# Patient Record
Sex: MALE | Race: WHITE | URBAN - METROPOLITAN AREA
[De-identification: names, ages, dates, MRNs, and addresses within clinical notes are randomized per-mention and may not be internally consistent; named-entity substitution may affect disease eponyms.]

---

## 2017-06-14 ENCOUNTER — OUTPATIENT (OUTPATIENT)
Dept: OUTPATIENT SERVICES | Facility: HOSPITAL | Age: 62
LOS: 1 days | Discharge: HOME | End: 2017-06-14

## 2017-06-28 DIAGNOSIS — Z95.2 PRESENCE OF PROSTHETIC HEART VALVE: ICD-10-CM

## 2017-06-28 DIAGNOSIS — Z79.01 LONG TERM (CURRENT) USE OF ANTICOAGULANTS: ICD-10-CM

## 2018-04-20 ENCOUNTER — RESULT REVIEW (OUTPATIENT)
Age: 63
End: 2018-04-20

## 2019-09-03 ENCOUNTER — EMERGENCY (EMERGENCY)
Facility: HOSPITAL | Age: 64
LOS: 0 days | Discharge: HOME | End: 2019-09-03
Admitting: EMERGENCY MEDICINE
Payer: COMMERCIAL

## 2019-09-03 VITALS
OXYGEN SATURATION: 100 % | TEMPERATURE: 98 F | DIASTOLIC BLOOD PRESSURE: 85 MMHG | SYSTOLIC BLOOD PRESSURE: 181 MMHG | HEART RATE: 88 BPM | RESPIRATION RATE: 17 BRPM

## 2019-09-03 DIAGNOSIS — Z23 ENCOUNTER FOR IMMUNIZATION: ICD-10-CM

## 2019-09-03 DIAGNOSIS — M25.539 PAIN IN UNSPECIFIED WRIST: ICD-10-CM
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
## 2019-09-03 DIAGNOSIS — L03.114 CELLULITIS OF LEFT UPPER LIMB: ICD-10-CM

## 2019-09-03 DIAGNOSIS — Z79.899 OTHER LONG TERM (CURRENT) DRUG THERAPY: ICD-10-CM

## 2019-09-03 DIAGNOSIS — S50.312A ABRASION OF LEFT ELBOW, INITIAL ENCOUNTER: ICD-10-CM
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
## 2019-09-03 PROCEDURE — 99283 EMERGENCY DEPT VISIT LOW MDM: CPT

## 2019-09-03 RX ORDER — TETANUS TOXOID, REDUCED DIPHTHERIA TOXOID AND ACELLULAR PERTUSSIS VACCINE, ADSORBED 5; 2.5; 8; 8; 2.5 [IU]/.5ML; [IU]/.5ML; UG/.5ML; UG/.5ML; UG/.5ML
0.5 SUSPENSION INTRAMUSCULAR ONCE
Refills: 0 | Status: COMPLETED | OUTPATIENT
Start: 2019-09-03 | End: 2019-09-03

## 2019-09-03 RX ADMIN — TETANUS TOXOID, REDUCED DIPHTHERIA TOXOID AND ACELLULAR PERTUSSIS VACCINE, ADSORBED 0.5 MILLILITER(S): 5; 2.5; 8; 8; 2.5 SUSPENSION INTRAMUSCULAR at 14:09

## 2019-09-03 NOTE — ED ADULT NURSE NOTE - OBJECTIVE STATEMENT
left elbow injury. Pt states he fell off a rolling chair last wed. or thurs. Pt states he picked a scab last night on his elbow. Pt noticed swelling last night into today. Left elbow is swollen and red.

## 2019-09-03 NOTE — ED PROVIDER NOTE - CLINICAL SUMMARY MEDICAL DECISION MAKING FREE TEXT BOX
Pt with cellulitis secondary to wound infx. No joint involvement. Will give PO abx. FU with PMD. I have discussed the discharge plan with the patient. The patient agrees with the plan, as discussed.  The patient understands Emergency Department diagnosis is a preliminary diagnosis often based on limited information and that the patient must adhere to the follow-up plan as discussed.  The patient understands that if the symptoms worsen or if prescribed medications do not have the desired/planned effect that the patient may return to the Emergency Department at any time for further evaluation and treatment.

## 2019-09-03 NOTE — ED PROVIDER NOTE - NS ED MD DISPO DISCHARGE CCDA
Patient/Caregiver provided printed discharge information. - Patient reports subjective fevers at home, afebrile since admission, Tmax 100.2  - Acetaminophen 650 mg PRN q6h for T >100.4

## 2019-09-03 NOTE — ED PROVIDER NOTE - PATIENT PORTAL LINK FT
You can access the FollowMyHealth Patient Portal offered by [facility] by registering at the following website: [email or web address]. By joining [facility]’s FollowMyHealth portal, you will also be able to view your health information using other applications (apps) compatible with our system.

## 2019-09-03 NOTE — ED PROVIDER NOTE - PHYSICAL EXAMINATION
CONST: Well appearing in NAD  MS: Normal ROM in all extremities. No midline spinal tenderness. Left elbow with small abrasion posteriorly with localized redness, swelling and increased heat. No streaking or pus from wound. No pain on ROM of elbow  SKIN: Warm, dry, no acute rashes. Good turgor  NEURO: A&Ox3, No focal deficits. Strength 5/5 with no sensory deficits. Steady gait

## 2019-09-03 NOTE — ED PROVIDER NOTE - OBJECTIVE STATEMENT
Pt fell and injured left elbow 4 days ago. Pt picked a scabbed and recently became red and swollen. No fever. Pain is mild and dull and constant. No pain on ROM

## 2020-09-08 ENCOUNTER — OUTPATIENT (OUTPATIENT)
Dept: OUTPATIENT SERVICES | Facility: HOSPITAL | Age: 65
LOS: 1 days | Discharge: HOME | End: 2020-09-08
Payer: MEDICARE

## 2020-09-08 DIAGNOSIS — M25.571 PAIN IN RIGHT ANKLE AND JOINTS OF RIGHT FOOT: ICD-10-CM

## 2020-09-08 DIAGNOSIS — R52 PAIN, UNSPECIFIED: ICD-10-CM

## 2020-09-08 PROCEDURE — 76882 US LMTD JT/FCL EVL NVASC XTR: CPT | Mod: 26,RT

## 2021-01-26 ENCOUNTER — OUTPATIENT (OUTPATIENT)
Dept: OUTPATIENT SERVICES | Facility: HOSPITAL | Age: 66
LOS: 1 days | Discharge: HOME | End: 2021-01-26

## 2021-01-26 DIAGNOSIS — Z11.59 ENCOUNTER FOR SCREENING FOR OTHER VIRAL DISEASES: ICD-10-CM

## 2022-11-29 PROBLEM — Z00.00 ENCOUNTER FOR PREVENTIVE HEALTH EXAMINATION: Status: ACTIVE | Noted: 2022-11-29

## 2022-12-02 ENCOUNTER — APPOINTMENT (OUTPATIENT)
Dept: CARDIOTHORACIC SURGERY | Facility: CLINIC | Age: 67
End: 2022-12-02

## 2022-12-02 VITALS — HEIGHT: 70 IN | WEIGHT: 210 LBS | BODY MASS INDEX: 30.06 KG/M2

## 2022-12-02 PROCEDURE — G0296 VISIT TO DETERM LDCT ELIG: CPT

## 2022-12-02 NOTE — PLAN
[Smoking Cessation Guidance Provided] : Smoking cessation guidance was provided to patient [Smoking Cessation] : smoking cessation [Regular follow-up with healthcare provider] : regular follow-up with healthcare provider [FreeTextEntry1] : \par Plan:\par -Low Dose CT chest for lung cancer screening\par -Follow up with patient and his referring provider after his LDCT results have been reviewed by the multi-disciplinary clinical team\par -Encouraged smoking cessation\par -[facility] Smokers Quitline literature shared with patient and Staying Smoke Free brochure reviewed\par -Patient declined [facility] Smokers Quitline literature\par -Smoking Cessation was offered, - yes \par Should I Screen? tool utilized. 6 year risk of lung cancer is 3 %. Patient wishes to proceed with screening.\par Engaged in shared decision making with Mr. DAVID PETERSON . Answered all questions. He verbalized understanding and agreement. He knows to call back with any questions or concerns\par \par

## 2022-12-02 NOTE — HISTORY OF PRESENT ILLNESS
[Current] : Current [>=20 Pack-Years] : Twenty pack years or greater smoking history: Yes [TextBox_13] : Mr. DAVID PETERSON is a 67 year old man with a history of AVR 2017, HTN, HLD\par He was seen in the office by Dr. Welch for review of eligibility for, as well as, discussion of Low-Dose CT lung cancer screening program. Over the telephone today we reviewed and confirmed that the patient meets screening eligibility criteria:\par -Age: 67 year \par Smoking status:\par -Current smoker\par -Number of pack(s) per day: 1\par -Number of years smoked: 20\par -Number of pack years smokin\par \par Mr. PETERSON denies any signs or symptoms of lung cancer including new cough, change in cough, hemoptysis and unintentional weight loss. \par Mr. PETERSON denies any personal history of lung cancer. Sister with lung cancer. Denies any history of lung disease. Denies any history of occupational exposures\par \par  [PacksperDay] : 1 [N_Years] : 20 [PacksperYear] : 20

## 2022-12-02 NOTE — REASON FOR VISIT
[Home] : at home, [unfilled] , at the time of the visit. [Medical Office: ([facility])___] : at the medical office located in  [Verbal consent obtained from patient] : the patient, [unfilled] [Initial Evaluation] : an initial evaluation visit [Review of Eligibility] : review of eligibility [Low-Dose CT Screening Discussion] : low-dose CT lung cancer screening discussion [Virtual Visit] : virtual visit

## 2022-12-09 ENCOUNTER — OUTPATIENT (OUTPATIENT)
Dept: OUTPATIENT SERVICES | Facility: HOSPITAL | Age: 67
LOS: 1 days | Discharge: HOME | End: 2022-12-09

## 2022-12-09 DIAGNOSIS — F17.200 NICOTINE DEPENDENCE, UNSPECIFIED, UNCOMPLICATED: ICD-10-CM

## 2022-12-09 DIAGNOSIS — F17.210 NICOTINE DEPENDENCE, CIGARETTES, UNCOMPLICATED: ICD-10-CM

## 2022-12-09 DIAGNOSIS — I10 ESSENTIAL (PRIMARY) HYPERTENSION: ICD-10-CM

## 2022-12-09 PROCEDURE — 71271 CT THORAX LUNG CANCER SCR C-: CPT | Mod: 26

## 2023-08-14 ENCOUNTER — APPOINTMENT (OUTPATIENT)
Dept: NEUROSURGERY | Facility: CLINIC | Age: 68
End: 2023-08-14
Payer: MEDICARE

## 2023-08-14 DIAGNOSIS — M16.11 UNILATERAL PRIMARY OSTEOARTHRITIS, RIGHT HIP: ICD-10-CM

## 2023-08-14 DIAGNOSIS — M48.061 SPINAL STENOSIS, LUMBAR REGION WITHOUT NEUROGENIC CLAUDICATION: ICD-10-CM

## 2023-08-14 DIAGNOSIS — M43.17 SPONDYLOLISTHESIS, LUMBOSACRAL REGION: ICD-10-CM

## 2023-08-14 PROCEDURE — 99204 OFFICE O/P NEW MOD 45 MIN: CPT

## 2023-08-14 NOTE — HISTORY OF PRESENT ILLNESS
[de-identified] : Mr. Townsend presents today as a 68 year old man for initial neurosurgical consult. He has a chief complaint of lower back pain. Patient is a retired  with a 20 year history of lower back pain. As of recent he has begun to notice increased right sided lower back pain. He did undergo right knee replacement with good improvement of his right knee pain. No injury or incident noted. Right sided lower back pain travels into the right buttocks region but not beyond this. He denies left sided symptoms at this time. He does occasionally experience pain into the right groin. His Orthopedic surgeon Dr. Campuzano has been seeing him for his right hip pain.   Patient does advise he was diagnosed with right hip arthritis.   MRI Lumbar 06/08/23: L5-S1 spondylolisthesis. Left posterolateral L3-4 disc protrusion which nearly contacts the left L4 nerve root. Multilevel foraminal disc extension/narrowing greatest at L5-S1.

## 2023-08-14 NOTE — PHYSICAL EXAM
[de-identified] : Mild limp on the right side due to right hip pain  Diminished right knee jerk  No pain on flex/ext lumbar  Lower extremity strength intact bilaterally

## 2023-08-14 NOTE — DISCUSSION/SUMMARY
[de-identified] : No surgical intervention recommended at this time, especially since patient is on a blood thinner. He may benefit from pain management intervention in the future. If any intervention should be carried out surgically, it may be his right hip. I would like for the patient to undergo an X-ray of the lumbar spine to rule out instability. I will see him back once completed.   I, Murali Murdock, attest that this documentation has been prepared under the direction and in the presence of Provider Prabhu Maradiaga MD   Thank you for allowing me to assist in the care of this patient.    Prabhu Maradiaga MD  Board Certified , Neurosurgeon

## 2023-09-15 ENCOUNTER — LABORATORY RESULT (OUTPATIENT)
Age: 68
End: 2023-09-15

## 2023-09-15 ENCOUNTER — OUTPATIENT (OUTPATIENT)
Dept: OUTPATIENT SERVICES | Facility: HOSPITAL | Age: 68
LOS: 1 days | End: 2023-09-15
Payer: MEDICARE

## 2023-09-15 ENCOUNTER — APPOINTMENT (OUTPATIENT)
Dept: HEMATOLOGY ONCOLOGY | Facility: CLINIC | Age: 68
End: 2023-09-15
Payer: MEDICARE

## 2023-09-15 VITALS
HEIGHT: 70 IN | BODY MASS INDEX: 28.63 KG/M2 | RESPIRATION RATE: 14 BRPM | DIASTOLIC BLOOD PRESSURE: 79 MMHG | TEMPERATURE: 98.4 F | SYSTOLIC BLOOD PRESSURE: 167 MMHG | HEART RATE: 85 BPM | WEIGHT: 200 LBS

## 2023-09-15 DIAGNOSIS — I82.409 ACUTE EMBOLISM AND THROMBOSIS OF UNSPECIFIED DEEP VEINS OF UNSPECIFIED LOWER EXTREMITY: ICD-10-CM

## 2023-09-15 PROCEDURE — 83090 ASSAY OF HOMOCYSTEINE: CPT

## 2023-09-15 PROCEDURE — 86146 BETA-2 GLYCOPROTEIN ANTIBODY: CPT

## 2023-09-15 PROCEDURE — 85613 RUSSELL VIPER VENOM DILUTED: CPT

## 2023-09-15 PROCEDURE — 85598 HEXAGNAL PHOSPH PLTLT NEUTRL: CPT

## 2023-09-15 PROCEDURE — 85303 CLOT INHIBIT PROT C ACTIVITY: CPT

## 2023-09-15 PROCEDURE — 81241 F5 GENE: CPT

## 2023-09-15 PROCEDURE — 85300 ANTITHROMBIN III ACTIVITY: CPT

## 2023-09-15 PROCEDURE — 81291 MTHFR GENE: CPT

## 2023-09-15 PROCEDURE — 85730 THROMBOPLASTIN TIME PARTIAL: CPT | Mod: XU

## 2023-09-15 PROCEDURE — G0452: CPT | Mod: 26

## 2023-09-15 PROCEDURE — 85027 COMPLETE CBC AUTOMATED: CPT

## 2023-09-15 PROCEDURE — 99203 OFFICE O/P NEW LOW 30 MIN: CPT

## 2023-09-15 PROCEDURE — 86147 CARDIOLIPIN ANTIBODY EA IG: CPT

## 2023-09-15 PROCEDURE — 85732 THROMBOPLASTIN TIME PARTIAL: CPT | Mod: XU

## 2023-09-15 PROCEDURE — 81240 F2 GENE: CPT

## 2023-09-15 PROCEDURE — 85306 CLOT INHIBIT PROT S FREE: CPT

## 2023-09-15 RX ORDER — ATORVASTATIN CALCIUM 80 MG/1
80 TABLET, FILM COATED ORAL
Refills: 0 | Status: ACTIVE | COMMUNITY

## 2023-09-15 RX ORDER — METOPROLOL SUCCINATE 50 MG/1
50 TABLET, EXTENDED RELEASE ORAL
Refills: 0 | Status: ACTIVE | COMMUNITY

## 2023-09-15 RX ORDER — VALSARTAN AND HYDROCHLOROTHIAZIDE 320; 12.5 MG/1; MG/1
320-12.5 TABLET, FILM COATED ORAL
Refills: 0 | Status: ACTIVE | COMMUNITY

## 2023-09-15 RX ORDER — LATANOPROST/PF 0.005 %
0.01 DROPS OPHTHALMIC (EYE)
Refills: 0 | Status: ACTIVE | COMMUNITY

## 2023-09-15 RX ORDER — OMEPRAZOLE 40 MG/1
40 CAPSULE, DELAYED RELEASE ORAL
Refills: 0 | Status: ACTIVE | COMMUNITY

## 2023-09-15 RX ORDER — ASPIRIN 81 MG/1
81 TABLET, CHEWABLE ORAL
Refills: 0 | Status: ACTIVE | COMMUNITY

## 2023-09-15 RX ORDER — MELOXICAM 15 MG/1
15 TABLET ORAL
Refills: 0 | Status: ACTIVE | COMMUNITY

## 2023-09-15 RX ORDER — NIFEDIPINE 60 MG
60 TABLET, EXTENDED RELEASE ORAL
Refills: 0 | Status: ACTIVE | COMMUNITY

## 2023-09-15 RX ORDER — FUROSEMIDE 40 MG/1
40 TABLET ORAL
Refills: 0 | Status: ACTIVE | COMMUNITY

## 2023-09-15 RX ORDER — TRIAMCINOLONE ACETONIDE 1 MG/G
0.1 CREAM TOPICAL
Refills: 0 | Status: ACTIVE | COMMUNITY

## 2023-09-15 RX ORDER — RIVAROXABAN 20 MG/1
20 TABLET, FILM COATED ORAL
Refills: 0 | Status: ACTIVE | COMMUNITY

## 2023-09-16 DIAGNOSIS — I82.409 ACUTE EMBOLISM AND THROMBOSIS OF UNSPECIFIED DEEP VEINS OF UNSPECIFIED LOWER EXTREMITY: ICD-10-CM

## 2023-10-02 LAB
AT III PPP CHRO-ACNC: 92 %
B2 GLYCOPROT1 IGA SERPL IA-ACNC: 7.3 SAU
B2 GLYCOPROT1 IGG SER-ACNC: 38.6 SGU
B2 GLYCOPROT1 IGM SER-ACNC: <5 SMU
CARDIOLIPIN AB SER IA-ACNC: NEGATIVE
CARDIOLIPIN IGM SER-MCNC: <5 GPL
CARDIOLIPIN IGM SER-MCNC: <5 MPL
DNA PLOIDY SPEC FC-IMP: NORMAL
HCT VFR BLD CALC: 41.9 %
HCYS SERPL-MCNC: 23.1 UMOL/L
HGB BLD-MCNC: 14.5 G/DL
HLX MTHFR FINAL REPORT: NORMAL
MCHC RBC-ENTMCNC: 33.3 PG
MCHC RBC-ENTMCNC: 34.6 G/DL
MCV RBC AUTO: 96.1 FL
PLATELET # BLD AUTO: 155 K/UL
PMV BLD: 9.1 FL
PROT C PPP CHRO-ACNC: 91 %
PROT S FREE PPP-ACNC: 128 %
PTR INTERP: NORMAL
RBC # BLD: 4.36 M/UL
RBC # FLD: 14.5 %
WBC # FLD AUTO: 5.23 K/UL

## 2023-11-14 ENCOUNTER — OUTPATIENT (OUTPATIENT)
Dept: OUTPATIENT SERVICES | Facility: HOSPITAL | Age: 68
LOS: 1 days | End: 2023-11-14
Payer: MEDICARE

## 2023-11-14 ENCOUNTER — APPOINTMENT (OUTPATIENT)
Dept: HEMATOLOGY ONCOLOGY | Facility: CLINIC | Age: 68
End: 2023-11-14
Payer: MEDICARE

## 2023-11-14 VITALS
TEMPERATURE: 98.1 F | WEIGHT: 201 LBS | HEIGHT: 70 IN | DIASTOLIC BLOOD PRESSURE: 75 MMHG | RESPIRATION RATE: 14 BRPM | SYSTOLIC BLOOD PRESSURE: 139 MMHG | BODY MASS INDEX: 28.77 KG/M2 | OXYGEN SATURATION: 98 % | HEART RATE: 84 BPM

## 2023-11-14 DIAGNOSIS — I82.409 ACUTE EMBOLISM AND THROMBOSIS OF UNSPECIFIED DEEP VEINS OF UNSPECIFIED LOWER EXTREMITY: ICD-10-CM

## 2023-11-14 PROCEDURE — 99213 OFFICE O/P EST LOW 20 MIN: CPT

## 2023-12-22 ENCOUNTER — NON-APPOINTMENT (OUTPATIENT)
Age: 68
End: 2023-12-22

## 2023-12-22 DIAGNOSIS — H53.8 OTHER VISUAL DISTURBANCES: ICD-10-CM

## 2023-12-22 DIAGNOSIS — G56.03 CARPAL TUNNEL SYNDROM,BILATERAL UPPER LIMBS: ICD-10-CM

## 2023-12-22 DIAGNOSIS — G54.4 LUMBOSACRAL ROOT DISORDERS, NOT ELSEWHERE CLASSIFIED: ICD-10-CM

## 2023-12-22 DIAGNOSIS — E78.00 PURE HYPERCHOLESTEROLEMIA, UNSPECIFIED: ICD-10-CM

## 2023-12-22 DIAGNOSIS — Z87.39 PERSONAL HISTORY OF OTHER DISEASES OF THE MUSCULOSKELETAL SYSTEM AND CONNECTIVE TISSUE: ICD-10-CM

## 2023-12-22 DIAGNOSIS — G40.109 LOCALIZATION-RELATED (FOCAL) (PARTIAL) SYMPTOMATIC EPILEPSY AND EPILEPTIC SYNDROMES WITH SIMPLE PARTIAL SEIZURES, NOT INTRACTABLE, W/OUT STATUS EPILEPTICUS: ICD-10-CM

## 2023-12-22 DIAGNOSIS — G54.2 CERVICAL ROOT DISORDERS, NOT ELSEWHERE CLASSIFIED: ICD-10-CM

## 2023-12-22 DIAGNOSIS — Z82.49 FAMILY HISTORY OF ISCHEMIC HEART DISEASE AND OTHER DISEASES OF THE CIRCULATORY SYSTEM: ICD-10-CM

## 2023-12-22 DIAGNOSIS — Z84.89 FAMILY HISTORY OF OTHER SPECIFIED CONDITIONS: ICD-10-CM

## 2023-12-27 ENCOUNTER — INPATIENT (INPATIENT)
Facility: HOSPITAL | Age: 68
LOS: 5 days | Discharge: HOME HEALTH | DRG: 291 | End: 2024-01-02
Attending: INTERNAL MEDICINE | Admitting: INTERNAL MEDICINE
Payer: MEDICARE

## 2023-12-27 VITALS
RESPIRATION RATE: 19 BRPM | OXYGEN SATURATION: 85 % | SYSTOLIC BLOOD PRESSURE: 167 MMHG | TEMPERATURE: 99 F | DIASTOLIC BLOOD PRESSURE: 74 MMHG | HEART RATE: 91 BPM

## 2023-12-27 LAB
ALBUMIN SERPL ELPH-MCNC: 3.8 G/DL — SIGNIFICANT CHANGE UP (ref 3.5–5.2)
ALBUMIN SERPL ELPH-MCNC: 3.8 G/DL — SIGNIFICANT CHANGE UP (ref 3.5–5.2)
ALP SERPL-CCNC: 139 U/L — HIGH (ref 30–115)
ALP SERPL-CCNC: 139 U/L — HIGH (ref 30–115)
ALT FLD-CCNC: 35 U/L — SIGNIFICANT CHANGE UP (ref 0–41)
ALT FLD-CCNC: 35 U/L — SIGNIFICANT CHANGE UP (ref 0–41)
ANION GAP SERPL CALC-SCNC: 8 MMOL/L — SIGNIFICANT CHANGE UP (ref 7–14)
ANION GAP SERPL CALC-SCNC: 8 MMOL/L — SIGNIFICANT CHANGE UP (ref 7–14)
ANISOCYTOSIS BLD QL: SLIGHT — SIGNIFICANT CHANGE UP
ANISOCYTOSIS BLD QL: SLIGHT — SIGNIFICANT CHANGE UP
AST SERPL-CCNC: 31 U/L — SIGNIFICANT CHANGE UP (ref 0–41)
AST SERPL-CCNC: 31 U/L — SIGNIFICANT CHANGE UP (ref 0–41)
BASE EXCESS BLDV CALC-SCNC: 8.6 MMOL/L — HIGH (ref -2–3)
BASE EXCESS BLDV CALC-SCNC: 8.6 MMOL/L — HIGH (ref -2–3)
BASOPHILS # BLD AUTO: 0.06 K/UL — SIGNIFICANT CHANGE UP (ref 0–0.2)
BASOPHILS # BLD AUTO: 0.06 K/UL — SIGNIFICANT CHANGE UP (ref 0–0.2)
BASOPHILS NFR BLD AUTO: 0.9 % — SIGNIFICANT CHANGE UP (ref 0–1)
BASOPHILS NFR BLD AUTO: 0.9 % — SIGNIFICANT CHANGE UP (ref 0–1)
BILIRUB SERPL-MCNC: <0.2 MG/DL — SIGNIFICANT CHANGE UP (ref 0.2–1.2)
BILIRUB SERPL-MCNC: <0.2 MG/DL — SIGNIFICANT CHANGE UP (ref 0.2–1.2)
BUN SERPL-MCNC: 19 MG/DL — SIGNIFICANT CHANGE UP (ref 10–20)
BUN SERPL-MCNC: 19 MG/DL — SIGNIFICANT CHANGE UP (ref 10–20)
CA-I SERPL-SCNC: 1.18 MMOL/L — SIGNIFICANT CHANGE UP (ref 1.15–1.33)
CA-I SERPL-SCNC: 1.18 MMOL/L — SIGNIFICANT CHANGE UP (ref 1.15–1.33)
CALCIUM SERPL-MCNC: 9.2 MG/DL — SIGNIFICANT CHANGE UP (ref 8.4–10.4)
CALCIUM SERPL-MCNC: 9.2 MG/DL — SIGNIFICANT CHANGE UP (ref 8.4–10.4)
CHLORIDE SERPL-SCNC: 103 MMOL/L — SIGNIFICANT CHANGE UP (ref 98–110)
CHLORIDE SERPL-SCNC: 103 MMOL/L — SIGNIFICANT CHANGE UP (ref 98–110)
CO2 SERPL-SCNC: 33 MMOL/L — HIGH (ref 17–32)
CO2 SERPL-SCNC: 33 MMOL/L — HIGH (ref 17–32)
CREAT SERPL-MCNC: 1 MG/DL — SIGNIFICANT CHANGE UP (ref 0.7–1.5)
CREAT SERPL-MCNC: 1 MG/DL — SIGNIFICANT CHANGE UP (ref 0.7–1.5)
EGFR: 82 ML/MIN/1.73M2 — SIGNIFICANT CHANGE UP
EGFR: 82 ML/MIN/1.73M2 — SIGNIFICANT CHANGE UP
EOSINOPHIL # BLD AUTO: 0.12 K/UL — SIGNIFICANT CHANGE UP (ref 0–0.7)
EOSINOPHIL # BLD AUTO: 0.12 K/UL — SIGNIFICANT CHANGE UP (ref 0–0.7)
EOSINOPHIL NFR BLD AUTO: 1.7 % — SIGNIFICANT CHANGE UP (ref 0–8)
EOSINOPHIL NFR BLD AUTO: 1.7 % — SIGNIFICANT CHANGE UP (ref 0–8)
GAS PNL BLDV: 139 MMOL/L — SIGNIFICANT CHANGE UP (ref 136–145)
GAS PNL BLDV: 139 MMOL/L — SIGNIFICANT CHANGE UP (ref 136–145)
GAS PNL BLDV: SIGNIFICANT CHANGE UP
GAS PNL BLDV: SIGNIFICANT CHANGE UP
GIANT PLATELETS BLD QL SMEAR: PRESENT — SIGNIFICANT CHANGE UP
GIANT PLATELETS BLD QL SMEAR: PRESENT — SIGNIFICANT CHANGE UP
GLUCOSE SERPL-MCNC: 101 MG/DL — HIGH (ref 70–99)
GLUCOSE SERPL-MCNC: 101 MG/DL — HIGH (ref 70–99)
HCO3 BLDV-SCNC: 37 MMOL/L — HIGH (ref 22–29)
HCO3 BLDV-SCNC: 37 MMOL/L — HIGH (ref 22–29)
HCT VFR BLD CALC: 42.6 % — SIGNIFICANT CHANGE UP (ref 42–52)
HCT VFR BLD CALC: 42.6 % — SIGNIFICANT CHANGE UP (ref 42–52)
HCT VFR BLDA CALC: 42 % — SIGNIFICANT CHANGE UP (ref 39–51)
HCT VFR BLDA CALC: 42 % — SIGNIFICANT CHANGE UP (ref 39–51)
HGB BLD CALC-MCNC: 14 G/DL — SIGNIFICANT CHANGE UP (ref 12.6–17.4)
HGB BLD CALC-MCNC: 14 G/DL — SIGNIFICANT CHANGE UP (ref 12.6–17.4)
HGB BLD-MCNC: 13.4 G/DL — LOW (ref 14–18)
HGB BLD-MCNC: 13.4 G/DL — LOW (ref 14–18)
LACTATE BLDV-MCNC: 1.5 MMOL/L — SIGNIFICANT CHANGE UP (ref 0.5–2)
LACTATE BLDV-MCNC: 1.5 MMOL/L — SIGNIFICANT CHANGE UP (ref 0.5–2)
LYMPHOCYTES # BLD AUTO: 1 K/UL — LOW (ref 1.2–3.4)
LYMPHOCYTES # BLD AUTO: 1 K/UL — LOW (ref 1.2–3.4)
LYMPHOCYTES # BLD AUTO: 13.9 % — LOW (ref 20.5–51.1)
LYMPHOCYTES # BLD AUTO: 13.9 % — LOW (ref 20.5–51.1)
MACROCYTES BLD QL: SLIGHT — SIGNIFICANT CHANGE UP
MACROCYTES BLD QL: SLIGHT — SIGNIFICANT CHANGE UP
MAGNESIUM SERPL-MCNC: 2.5 MG/DL — HIGH (ref 1.8–2.4)
MAGNESIUM SERPL-MCNC: 2.5 MG/DL — HIGH (ref 1.8–2.4)
MANUAL SMEAR VERIFICATION: SIGNIFICANT CHANGE UP
MANUAL SMEAR VERIFICATION: SIGNIFICANT CHANGE UP
MCHC RBC-ENTMCNC: 31.5 G/DL — LOW (ref 32–37)
MCHC RBC-ENTMCNC: 31.5 G/DL — LOW (ref 32–37)
MCHC RBC-ENTMCNC: 33.3 PG — HIGH (ref 27–31)
MCHC RBC-ENTMCNC: 33.3 PG — HIGH (ref 27–31)
MCV RBC AUTO: 105.7 FL — HIGH (ref 80–94)
MCV RBC AUTO: 105.7 FL — HIGH (ref 80–94)
MONOCYTES # BLD AUTO: 0.81 K/UL — HIGH (ref 0.1–0.6)
MONOCYTES # BLD AUTO: 0.81 K/UL — HIGH (ref 0.1–0.6)
MONOCYTES NFR BLD AUTO: 11.3 % — HIGH (ref 1.7–9.3)
MONOCYTES NFR BLD AUTO: 11.3 % — HIGH (ref 1.7–9.3)
NEUTROPHILS # BLD AUTO: 5.19 K/UL — SIGNIFICANT CHANGE UP (ref 1.4–6.5)
NEUTROPHILS # BLD AUTO: 5.19 K/UL — SIGNIFICANT CHANGE UP (ref 1.4–6.5)
NEUTROPHILS NFR BLD AUTO: 72.2 % — SIGNIFICANT CHANGE UP (ref 42.2–75.2)
NEUTROPHILS NFR BLD AUTO: 72.2 % — SIGNIFICANT CHANGE UP (ref 42.2–75.2)
NRBC # BLD: 1 /100 WBCS — HIGH (ref 0–0)
NRBC # BLD: 1 /100 WBCS — HIGH (ref 0–0)
NRBC # BLD: SIGNIFICANT CHANGE UP /100 WBCS (ref 0–0)
NRBC # BLD: SIGNIFICANT CHANGE UP /100 WBCS (ref 0–0)
NT-PROBNP SERPL-SCNC: 4561 PG/ML — HIGH (ref 0–300)
NT-PROBNP SERPL-SCNC: 4561 PG/ML — HIGH (ref 0–300)
PCO2 BLDV: 71 MMHG — HIGH (ref 42–55)
PCO2 BLDV: 71 MMHG — HIGH (ref 42–55)
PH BLDV: 7.33 — SIGNIFICANT CHANGE UP (ref 7.32–7.43)
PH BLDV: 7.33 — SIGNIFICANT CHANGE UP (ref 7.32–7.43)
PLAT MORPH BLD: NORMAL — SIGNIFICANT CHANGE UP
PLAT MORPH BLD: NORMAL — SIGNIFICANT CHANGE UP
PLATELET # BLD AUTO: 262 K/UL — SIGNIFICANT CHANGE UP (ref 130–400)
PLATELET # BLD AUTO: 262 K/UL — SIGNIFICANT CHANGE UP (ref 130–400)
PMV BLD: 9.5 FL — SIGNIFICANT CHANGE UP (ref 7.4–10.4)
PMV BLD: 9.5 FL — SIGNIFICANT CHANGE UP (ref 7.4–10.4)
PO2 BLDV: 20 MMHG — LOW (ref 25–45)
PO2 BLDV: 20 MMHG — LOW (ref 25–45)
POIKILOCYTOSIS BLD QL AUTO: SLIGHT — SIGNIFICANT CHANGE UP
POIKILOCYTOSIS BLD QL AUTO: SLIGHT — SIGNIFICANT CHANGE UP
POLYCHROMASIA BLD QL SMEAR: SLIGHT — SIGNIFICANT CHANGE UP
POLYCHROMASIA BLD QL SMEAR: SLIGHT — SIGNIFICANT CHANGE UP
POTASSIUM BLDV-SCNC: 4.8 MMOL/L — SIGNIFICANT CHANGE UP (ref 3.5–5.1)
POTASSIUM BLDV-SCNC: 4.8 MMOL/L — SIGNIFICANT CHANGE UP (ref 3.5–5.1)
POTASSIUM SERPL-MCNC: 5.5 MMOL/L — HIGH (ref 3.5–5)
POTASSIUM SERPL-MCNC: 5.5 MMOL/L — HIGH (ref 3.5–5)
POTASSIUM SERPL-SCNC: 5.5 MMOL/L — HIGH (ref 3.5–5)
POTASSIUM SERPL-SCNC: 5.5 MMOL/L — HIGH (ref 3.5–5)
PROT SERPL-MCNC: 7.2 G/DL — SIGNIFICANT CHANGE UP (ref 6–8)
PROT SERPL-MCNC: 7.2 G/DL — SIGNIFICANT CHANGE UP (ref 6–8)
RBC # BLD: 4.03 M/UL — LOW (ref 4.7–6.1)
RBC # BLD: 4.03 M/UL — LOW (ref 4.7–6.1)
RBC # FLD: 13.9 % — SIGNIFICANT CHANGE UP (ref 11.5–14.5)
RBC # FLD: 13.9 % — SIGNIFICANT CHANGE UP (ref 11.5–14.5)
RBC BLD AUTO: ABNORMAL
RBC BLD AUTO: ABNORMAL
SAO2 % BLDV: 14.4 % — LOW (ref 67–88)
SAO2 % BLDV: 14.4 % — LOW (ref 67–88)
SCHISTOCYTES BLD QL AUTO: SLIGHT — SIGNIFICANT CHANGE UP
SCHISTOCYTES BLD QL AUTO: SLIGHT — SIGNIFICANT CHANGE UP
SODIUM SERPL-SCNC: 144 MMOL/L — SIGNIFICANT CHANGE UP (ref 135–146)
SODIUM SERPL-SCNC: 144 MMOL/L — SIGNIFICANT CHANGE UP (ref 135–146)
TROPONIN T, HIGH SENSITIVITY RESULT: 51 NG/L — HIGH (ref 6–21)
TROPONIN T, HIGH SENSITIVITY RESULT: 51 NG/L — HIGH (ref 6–21)
WBC # BLD: 7.19 K/UL — SIGNIFICANT CHANGE UP (ref 4.8–10.8)
WBC # BLD: 7.19 K/UL — SIGNIFICANT CHANGE UP (ref 4.8–10.8)
WBC # FLD AUTO: 7.19 K/UL — SIGNIFICANT CHANGE UP (ref 4.8–10.8)
WBC # FLD AUTO: 7.19 K/UL — SIGNIFICANT CHANGE UP (ref 4.8–10.8)

## 2023-12-27 PROCEDURE — 99291 CRITICAL CARE FIRST HOUR: CPT

## 2023-12-27 PROCEDURE — 71046 X-RAY EXAM CHEST 2 VIEWS: CPT | Mod: 26

## 2023-12-27 PROCEDURE — 71275 CT ANGIOGRAPHY CHEST: CPT | Mod: 26,MA

## 2023-12-27 RX ORDER — IPRATROPIUM/ALBUTEROL SULFATE 18-103MCG
3 AEROSOL WITH ADAPTER (GRAM) INHALATION ONCE
Refills: 0 | Status: COMPLETED | OUTPATIENT
Start: 2023-12-27 | End: 2023-12-27

## 2023-12-27 RX ORDER — FUROSEMIDE 40 MG
40 TABLET ORAL ONCE
Refills: 0 | Status: COMPLETED | OUTPATIENT
Start: 2023-12-27 | End: 2023-12-27

## 2023-12-27 RX ADMIN — Medication 40 MILLIGRAM(S): at 21:53

## 2023-12-27 RX ADMIN — Medication 3 MILLILITER(S): at 21:53

## 2023-12-27 RX ADMIN — Medication 3 MILLILITER(S): at 21:54

## 2023-12-27 RX ADMIN — Medication 3 MILLILITER(S): at 21:55

## 2023-12-27 NOTE — ED ADULT NURSE REASSESSMENT NOTE - NS ED NURSE REASSESS COMMENT FT1
Received report from prior RN ,pt is alert and awake, on BIPAP, iv intact vital stable no s/s of distress.

## 2023-12-27 NOTE — ED PROVIDER NOTE - OBJECTIVE STATEMENT
68 year old male, past medical history +smoker, avr, dvt/pe on ac, htn, hdl, chf, who presents with cough. patient with wet cough that began x5 days ago with worsening shortness of breath and increased weakness. as per daughter, patient with increased "lethargy" x3 days prompting ed eval. denies chest pain, hemoptysis, back pain, abd pain, nausea/vomiting, syncope.

## 2023-12-27 NOTE — ED PROVIDER NOTE - CARE PLAN
Principal Discharge DX:	COPD with respiratory distress, acute  Secondary Diagnosis:	CHF exacerbation   1

## 2023-12-27 NOTE — ED ADULT TRIAGE NOTE - CHIEF COMPLAINT QUOTE
slurred speech and confusion for a few days according to wife. pt has history of pe and is on xaralto slurred speech and confusion for a few days according to wife. pt has history of pe and is on Xarelto. pt states he has been coughing for a few days

## 2023-12-27 NOTE — ED PROVIDER NOTE - CLINICAL SUMMARY MEDICAL DECISION MAKING FREE TEXT BOX
69 yo M presented to the ED for SOB. Pt was found to be retaining in the ED and required 6L NC and was subsequently placed on bipap. Pt admitted for further evaluation management. 67 yo M presented to the ED for SOB. Pt was found to be retaining in the ED and required 6L NC and was subsequently placed on bipap. Pt admitted for further evaluation management.

## 2023-12-27 NOTE — ED PROVIDER NOTE - PHYSICAL EXAMINATION
CONSTITUTIONAL: non-toxic appearing male  SKIN: skin exam is warm and dry  HEAD: Normocephalic; atraumatic  EYES: PERRL, conjunctiva and sclera clear.  ENT: MMM   NECK: Supple; non tender.  ROM intact.  CARD: S1, S2 normal, no murmur  RESP: decreased breath sounds bilaterally, +increased wob, speaking full sentences   ABD: soft; non-distended; non-tender.   EXT: 2+ pitting edema bilaterally, no skin changes  NEURO: awake, alert, following commands, oriented, grossly unremarkable. No Focal deficits. GCS 15.   PSYCH: Cooperative, appropriate.

## 2023-12-27 NOTE — ED PROVIDER NOTE - PROGRESS NOTE DETAILS
[facility]

## 2023-12-27 NOTE — ED PROVIDER NOTE - ATTENDING CONTRIBUTION TO CARE
67 yo M with pMH of DVT/PE, CHF, HLD, + smoker presents to the ED for SOB x5 days. No CP, nausea, vomiting, diarrhea, constipation.     Const: No apparent distress  Eyes: PERRL, no conjunctival injection  HENT:  Neck supple without meningismus   CV: RRR, Warm, well-perfused extremities  RESP: CTA B/L, no tachypnea   GI: soft, non-tender, non-distended  MSK: No gross deformities appreciated  Skin: Warm, dry. No rashes  Neuro: Alert, CNs II-XII grossly intact. Sensation and motor function of extremities grossly intact.  Psych: Appropriate mood and affect.    will do labs, cxr,

## 2023-12-28 DIAGNOSIS — Z95.2 PRESENCE OF PROSTHETIC HEART VALVE: Chronic | ICD-10-CM

## 2023-12-28 DIAGNOSIS — J44.1 CHRONIC OBSTRUCTIVE PULMONARY DISEASE WITH (ACUTE) EXACERBATION: ICD-10-CM

## 2023-12-28 LAB
ALBUMIN SERPL ELPH-MCNC: 3.5 G/DL — SIGNIFICANT CHANGE UP (ref 3.5–5.2)
ALBUMIN SERPL ELPH-MCNC: 3.5 G/DL — SIGNIFICANT CHANGE UP (ref 3.5–5.2)
ALP SERPL-CCNC: 139 U/L — HIGH (ref 30–115)
ALP SERPL-CCNC: 139 U/L — HIGH (ref 30–115)
ALT FLD-CCNC: 29 U/L — SIGNIFICANT CHANGE UP (ref 0–41)
ALT FLD-CCNC: 29 U/L — SIGNIFICANT CHANGE UP (ref 0–41)
ANION GAP SERPL CALC-SCNC: 10 MMOL/L — SIGNIFICANT CHANGE UP (ref 7–14)
ANION GAP SERPL CALC-SCNC: 10 MMOL/L — SIGNIFICANT CHANGE UP (ref 7–14)
AST SERPL-CCNC: 23 U/L — SIGNIFICANT CHANGE UP (ref 0–41)
AST SERPL-CCNC: 23 U/L — SIGNIFICANT CHANGE UP (ref 0–41)
BASE EXCESS BLDV CALC-SCNC: 5.7 MMOL/L — HIGH (ref -2–3)
BASE EXCESS BLDV CALC-SCNC: 5.7 MMOL/L — HIGH (ref -2–3)
BASOPHILS # BLD AUTO: 0.05 K/UL — SIGNIFICANT CHANGE UP (ref 0–0.2)
BASOPHILS # BLD AUTO: 0.05 K/UL — SIGNIFICANT CHANGE UP (ref 0–0.2)
BASOPHILS NFR BLD AUTO: 0.7 % — SIGNIFICANT CHANGE UP (ref 0–1)
BASOPHILS NFR BLD AUTO: 0.7 % — SIGNIFICANT CHANGE UP (ref 0–1)
BILIRUB SERPL-MCNC: 0.3 MG/DL — SIGNIFICANT CHANGE UP (ref 0.2–1.2)
BILIRUB SERPL-MCNC: 0.3 MG/DL — SIGNIFICANT CHANGE UP (ref 0.2–1.2)
BUN SERPL-MCNC: 17 MG/DL — SIGNIFICANT CHANGE UP (ref 10–20)
BUN SERPL-MCNC: 17 MG/DL — SIGNIFICANT CHANGE UP (ref 10–20)
CA-I SERPL-SCNC: 1.14 MMOL/L — LOW (ref 1.15–1.33)
CA-I SERPL-SCNC: 1.14 MMOL/L — LOW (ref 1.15–1.33)
CALCIUM SERPL-MCNC: 8.8 MG/DL — SIGNIFICANT CHANGE UP (ref 8.4–10.5)
CALCIUM SERPL-MCNC: 8.8 MG/DL — SIGNIFICANT CHANGE UP (ref 8.4–10.5)
CHLORIDE SERPL-SCNC: 103 MMOL/L — SIGNIFICANT CHANGE UP (ref 98–110)
CHLORIDE SERPL-SCNC: 103 MMOL/L — SIGNIFICANT CHANGE UP (ref 98–110)
CO2 SERPL-SCNC: 32 MMOL/L — SIGNIFICANT CHANGE UP (ref 17–32)
CO2 SERPL-SCNC: 32 MMOL/L — SIGNIFICANT CHANGE UP (ref 17–32)
CREAT SERPL-MCNC: 0.9 MG/DL — SIGNIFICANT CHANGE UP (ref 0.7–1.5)
CREAT SERPL-MCNC: 0.9 MG/DL — SIGNIFICANT CHANGE UP (ref 0.7–1.5)
EGFR: 93 ML/MIN/1.73M2 — SIGNIFICANT CHANGE UP
EGFR: 93 ML/MIN/1.73M2 — SIGNIFICANT CHANGE UP
EOSINOPHIL # BLD AUTO: 0.01 K/UL — SIGNIFICANT CHANGE UP (ref 0–0.7)
EOSINOPHIL # BLD AUTO: 0.01 K/UL — SIGNIFICANT CHANGE UP (ref 0–0.7)
EOSINOPHIL NFR BLD AUTO: 0.1 % — SIGNIFICANT CHANGE UP (ref 0–8)
EOSINOPHIL NFR BLD AUTO: 0.1 % — SIGNIFICANT CHANGE UP (ref 0–8)
FLUAV AG NPH QL: SIGNIFICANT CHANGE UP
FLUAV AG NPH QL: SIGNIFICANT CHANGE UP
FLUBV AG NPH QL: SIGNIFICANT CHANGE UP
FLUBV AG NPH QL: SIGNIFICANT CHANGE UP
GAS PNL BLDA: SIGNIFICANT CHANGE UP
GAS PNL BLDA: SIGNIFICANT CHANGE UP
GAS PNL BLDV: 138 MMOL/L — SIGNIFICANT CHANGE UP (ref 136–145)
GAS PNL BLDV: 138 MMOL/L — SIGNIFICANT CHANGE UP (ref 136–145)
GAS PNL BLDV: SIGNIFICANT CHANGE UP
GAS PNL BLDV: SIGNIFICANT CHANGE UP
GLUCOSE SERPL-MCNC: 143 MG/DL — HIGH (ref 70–99)
GLUCOSE SERPL-MCNC: 143 MG/DL — HIGH (ref 70–99)
HCO3 BLDV-SCNC: 35 MMOL/L — HIGH (ref 22–29)
HCO3 BLDV-SCNC: 35 MMOL/L — HIGH (ref 22–29)
HCT VFR BLD CALC: 41.6 % — LOW (ref 42–52)
HCT VFR BLD CALC: 41.6 % — LOW (ref 42–52)
HCT VFR BLDA CALC: 37 % — LOW (ref 39–51)
HCT VFR BLDA CALC: 37 % — LOW (ref 39–51)
HGB BLD CALC-MCNC: 12.4 G/DL — LOW (ref 12.6–17.4)
HGB BLD CALC-MCNC: 12.4 G/DL — LOW (ref 12.6–17.4)
HGB BLD-MCNC: 13.2 G/DL — LOW (ref 14–18)
HGB BLD-MCNC: 13.2 G/DL — LOW (ref 14–18)
IMM GRANULOCYTES NFR BLD AUTO: 0.8 % — HIGH (ref 0.1–0.3)
IMM GRANULOCYTES NFR BLD AUTO: 0.8 % — HIGH (ref 0.1–0.3)
LACTATE BLDV-MCNC: 1 MMOL/L — SIGNIFICANT CHANGE UP (ref 0.5–2)
LACTATE BLDV-MCNC: 1 MMOL/L — SIGNIFICANT CHANGE UP (ref 0.5–2)
LYMPHOCYTES # BLD AUTO: 0.29 K/UL — LOW (ref 1.2–3.4)
LYMPHOCYTES # BLD AUTO: 0.29 K/UL — LOW (ref 1.2–3.4)
LYMPHOCYTES # BLD AUTO: 3.8 % — LOW (ref 20.5–51.1)
LYMPHOCYTES # BLD AUTO: 3.8 % — LOW (ref 20.5–51.1)
MAGNESIUM SERPL-MCNC: 2.3 MG/DL — SIGNIFICANT CHANGE UP (ref 1.8–2.4)
MAGNESIUM SERPL-MCNC: 2.3 MG/DL — SIGNIFICANT CHANGE UP (ref 1.8–2.4)
MCHC RBC-ENTMCNC: 31.7 G/DL — LOW (ref 32–37)
MCHC RBC-ENTMCNC: 31.7 G/DL — LOW (ref 32–37)
MCHC RBC-ENTMCNC: 33.2 PG — HIGH (ref 27–31)
MCHC RBC-ENTMCNC: 33.2 PG — HIGH (ref 27–31)
MCV RBC AUTO: 104.5 FL — HIGH (ref 80–94)
MCV RBC AUTO: 104.5 FL — HIGH (ref 80–94)
MONOCYTES # BLD AUTO: 0.15 K/UL — SIGNIFICANT CHANGE UP (ref 0.1–0.6)
MONOCYTES # BLD AUTO: 0.15 K/UL — SIGNIFICANT CHANGE UP (ref 0.1–0.6)
MONOCYTES NFR BLD AUTO: 2 % — SIGNIFICANT CHANGE UP (ref 1.7–9.3)
MONOCYTES NFR BLD AUTO: 2 % — SIGNIFICANT CHANGE UP (ref 1.7–9.3)
NEUTROPHILS # BLD AUTO: 7 K/UL — HIGH (ref 1.4–6.5)
NEUTROPHILS # BLD AUTO: 7 K/UL — HIGH (ref 1.4–6.5)
NEUTROPHILS NFR BLD AUTO: 92.6 % — HIGH (ref 42.2–75.2)
NEUTROPHILS NFR BLD AUTO: 92.6 % — HIGH (ref 42.2–75.2)
NRBC # BLD: 0 /100 WBCS — SIGNIFICANT CHANGE UP (ref 0–0)
NRBC # BLD: 0 /100 WBCS — SIGNIFICANT CHANGE UP (ref 0–0)
PCO2 BLDV: 74 MMHG — HIGH (ref 42–55)
PCO2 BLDV: 74 MMHG — HIGH (ref 42–55)
PH BLDV: 7.28 — LOW (ref 7.32–7.43)
PH BLDV: 7.28 — LOW (ref 7.32–7.43)
PLATELET # BLD AUTO: 242 K/UL — SIGNIFICANT CHANGE UP (ref 130–400)
PLATELET # BLD AUTO: 242 K/UL — SIGNIFICANT CHANGE UP (ref 130–400)
PMV BLD: 9.5 FL — SIGNIFICANT CHANGE UP (ref 7.4–10.4)
PMV BLD: 9.5 FL — SIGNIFICANT CHANGE UP (ref 7.4–10.4)
PO2 BLDV: 52 MMHG — HIGH (ref 25–45)
PO2 BLDV: 52 MMHG — HIGH (ref 25–45)
POTASSIUM BLDV-SCNC: 4.4 MMOL/L — SIGNIFICANT CHANGE UP (ref 3.5–5.1)
POTASSIUM BLDV-SCNC: 4.4 MMOL/L — SIGNIFICANT CHANGE UP (ref 3.5–5.1)
POTASSIUM SERPL-MCNC: 4.9 MMOL/L — SIGNIFICANT CHANGE UP (ref 3.5–5)
POTASSIUM SERPL-MCNC: 4.9 MMOL/L — SIGNIFICANT CHANGE UP (ref 3.5–5)
POTASSIUM SERPL-SCNC: 4.9 MMOL/L — SIGNIFICANT CHANGE UP (ref 3.5–5)
POTASSIUM SERPL-SCNC: 4.9 MMOL/L — SIGNIFICANT CHANGE UP (ref 3.5–5)
PROT SERPL-MCNC: 6.8 G/DL — SIGNIFICANT CHANGE UP (ref 6–8)
PROT SERPL-MCNC: 6.8 G/DL — SIGNIFICANT CHANGE UP (ref 6–8)
RBC # BLD: 3.98 M/UL — LOW (ref 4.7–6.1)
RBC # BLD: 3.98 M/UL — LOW (ref 4.7–6.1)
RBC # FLD: 14 % — SIGNIFICANT CHANGE UP (ref 11.5–14.5)
RBC # FLD: 14 % — SIGNIFICANT CHANGE UP (ref 11.5–14.5)
RSV RNA NPH QL NAA+NON-PROBE: SIGNIFICANT CHANGE UP
RSV RNA NPH QL NAA+NON-PROBE: SIGNIFICANT CHANGE UP
SAO2 % BLDV: 75.1 % — SIGNIFICANT CHANGE UP (ref 67–88)
SAO2 % BLDV: 75.1 % — SIGNIFICANT CHANGE UP (ref 67–88)
SARS-COV-2 RNA SPEC QL NAA+PROBE: SIGNIFICANT CHANGE UP
SARS-COV-2 RNA SPEC QL NAA+PROBE: SIGNIFICANT CHANGE UP
SODIUM SERPL-SCNC: 145 MMOL/L — SIGNIFICANT CHANGE UP (ref 135–146)
SODIUM SERPL-SCNC: 145 MMOL/L — SIGNIFICANT CHANGE UP (ref 135–146)
TROPONIN T, HIGH SENSITIVITY RESULT: 47 NG/L — HIGH (ref 6–21)
TROPONIN T, HIGH SENSITIVITY RESULT: 47 NG/L — HIGH (ref 6–21)
WBC # BLD: 7.56 K/UL — SIGNIFICANT CHANGE UP (ref 4.8–10.8)
WBC # BLD: 7.56 K/UL — SIGNIFICANT CHANGE UP (ref 4.8–10.8)
WBC # FLD AUTO: 7.56 K/UL — SIGNIFICANT CHANGE UP (ref 4.8–10.8)
WBC # FLD AUTO: 7.56 K/UL — SIGNIFICANT CHANGE UP (ref 4.8–10.8)

## 2023-12-28 PROCEDURE — 80053 COMPREHEN METABOLIC PANEL: CPT

## 2023-12-28 PROCEDURE — 0241U: CPT

## 2023-12-28 PROCEDURE — 86803 HEPATITIS C AB TEST: CPT

## 2023-12-28 PROCEDURE — 84484 ASSAY OF TROPONIN QUANT: CPT

## 2023-12-28 PROCEDURE — 85025 COMPLETE CBC W/AUTO DIFF WBC: CPT

## 2023-12-28 PROCEDURE — 70551 MRI BRAIN STEM W/O DYE: CPT

## 2023-12-28 PROCEDURE — 85018 HEMOGLOBIN: CPT

## 2023-12-28 PROCEDURE — 84132 ASSAY OF SERUM POTASSIUM: CPT

## 2023-12-28 PROCEDURE — 83880 ASSAY OF NATRIURETIC PEPTIDE: CPT

## 2023-12-28 PROCEDURE — 82803 BLOOD GASES ANY COMBINATION: CPT

## 2023-12-28 PROCEDURE — 93880 EXTRACRANIAL BILAT STUDY: CPT

## 2023-12-28 PROCEDURE — 84295 ASSAY OF SERUM SODIUM: CPT

## 2023-12-28 PROCEDURE — 71045 X-RAY EXAM CHEST 1 VIEW: CPT

## 2023-12-28 PROCEDURE — 94660 CPAP INITIATION&MGMT: CPT

## 2023-12-28 PROCEDURE — 82330 ASSAY OF CALCIUM: CPT

## 2023-12-28 PROCEDURE — 70450 CT HEAD/BRAIN W/O DYE: CPT

## 2023-12-28 PROCEDURE — 85014 HEMATOCRIT: CPT

## 2023-12-28 PROCEDURE — 83735 ASSAY OF MAGNESIUM: CPT

## 2023-12-28 PROCEDURE — 94640 AIRWAY INHALATION TREATMENT: CPT

## 2023-12-28 PROCEDURE — 83605 ASSAY OF LACTIC ACID: CPT

## 2023-12-28 PROCEDURE — 93306 TTE W/DOPPLER COMPLETE: CPT

## 2023-12-28 PROCEDURE — 36415 COLL VENOUS BLD VENIPUNCTURE: CPT

## 2023-12-28 PROCEDURE — 84443 ASSAY THYROID STIM HORMONE: CPT

## 2023-12-28 PROCEDURE — 82607 VITAMIN B-12: CPT

## 2023-12-28 PROCEDURE — 82746 ASSAY OF FOLIC ACID SERUM: CPT

## 2023-12-28 RX ORDER — AZITHROMYCIN 500 MG/1
500 TABLET, FILM COATED ORAL EVERY 24 HOURS
Refills: 0 | Status: DISCONTINUED | OUTPATIENT
Start: 2023-12-28 | End: 2024-01-02

## 2023-12-28 RX ORDER — FUROSEMIDE 40 MG
40 TABLET ORAL DAILY
Refills: 0 | Status: DISCONTINUED | OUTPATIENT
Start: 2023-12-28 | End: 2023-12-29

## 2023-12-28 RX ORDER — RIVAROXABAN 15 MG-20MG
20 KIT ORAL
Refills: 0 | Status: DISCONTINUED | OUTPATIENT
Start: 2023-12-28 | End: 2024-01-02

## 2023-12-28 RX ORDER — VALSARTAN 80 MG/1
320 TABLET ORAL DAILY
Refills: 0 | Status: DISCONTINUED | OUTPATIENT
Start: 2023-12-28 | End: 2024-01-02

## 2023-12-28 RX ORDER — IPRATROPIUM/ALBUTEROL SULFATE 18-103MCG
3 AEROSOL WITH ADAPTER (GRAM) INHALATION EVERY 6 HOURS
Refills: 0 | Status: DISCONTINUED | OUTPATIENT
Start: 2023-12-28 | End: 2023-12-29

## 2023-12-28 RX ORDER — NIFEDIPINE 30 MG
60 TABLET, EXTENDED RELEASE 24 HR ORAL DAILY
Refills: 0 | Status: DISCONTINUED | OUTPATIENT
Start: 2023-12-28 | End: 2024-01-02

## 2023-12-28 RX ORDER — FUROSEMIDE 40 MG
40 TABLET ORAL
Refills: 0 | Status: DISCONTINUED | OUTPATIENT
Start: 2023-12-28 | End: 2023-12-28

## 2023-12-28 RX ORDER — ATORVASTATIN CALCIUM 80 MG/1
80 TABLET, FILM COATED ORAL AT BEDTIME
Refills: 0 | Status: DISCONTINUED | OUTPATIENT
Start: 2023-12-28 | End: 2024-01-02

## 2023-12-28 RX ORDER — METOPROLOL TARTRATE 50 MG
100 TABLET ORAL DAILY
Refills: 0 | Status: DISCONTINUED | OUTPATIENT
Start: 2023-12-28 | End: 2024-01-02

## 2023-12-28 RX ORDER — TIOTROPIUM BROMIDE 18 UG/1
2 CAPSULE ORAL; RESPIRATORY (INHALATION) DAILY
Refills: 0 | Status: DISCONTINUED | OUTPATIENT
Start: 2023-12-28 | End: 2024-01-02

## 2023-12-28 RX ORDER — PANTOPRAZOLE SODIUM 20 MG/1
40 TABLET, DELAYED RELEASE ORAL
Refills: 0 | Status: DISCONTINUED | OUTPATIENT
Start: 2023-12-28 | End: 2024-01-02

## 2023-12-28 RX ORDER — BUDESONIDE AND FORMOTEROL FUMARATE DIHYDRATE 160; 4.5 UG/1; UG/1
2 AEROSOL RESPIRATORY (INHALATION)
Refills: 0 | Status: DISCONTINUED | OUTPATIENT
Start: 2023-12-28 | End: 2024-01-02

## 2023-12-28 RX ADMIN — AZITHROMYCIN 255 MILLIGRAM(S): 500 TABLET, FILM COATED ORAL at 22:22

## 2023-12-28 RX ADMIN — Medication 60 MILLIGRAM(S): at 06:52

## 2023-12-28 RX ADMIN — Medication 60 MILLIGRAM(S): at 18:20

## 2023-12-28 RX ADMIN — Medication 40 MILLIGRAM(S): at 06:53

## 2023-12-28 RX ADMIN — Medication 125 MILLIGRAM(S): at 03:58

## 2023-12-28 RX ADMIN — RIVAROXABAN 20 MILLIGRAM(S): KIT at 18:19

## 2023-12-28 NOTE — H&P ADULT - NSHPLABSRESULTS_GEN_ALL_CORE
LABS:               13.4   7.19  )-----------( 262      ( 27 Dec 2023 20:27 )             42.6     12-27    144  |  103  |  19  ----------------------------<  101<H>  5.5<H>   |  33<H>  |  1.0    Ca    9.2      27 Dec 2023 20:27  Mg     2.5     12-27    TPro  7.2  /  Alb  3.8  /  TBili  <0.2  /  DBili  x   /  AST  31  /  ALT  35  /  AlkPhos  139<H>  12-27

## 2023-12-28 NOTE — H&P ADULT - NSICDXPASTMEDICALHX_GEN_ALL_CORE_FT
PAST MEDICAL HISTORY:  COPD, moderate     DVT, lower extremity     Dyslipidemia     Factor V Leiden     HTN (hypertension)

## 2023-12-28 NOTE — PATIENT PROFILE ADULT - FALL HARM RISK - HARM RISK INTERVENTIONS
Communicate Risk of Fall with Harm to all staff/Monitor for mental status changes/Reinforce activity limits and safety measures with patient and family/Tailored Fall Risk Interventions/Visual Cue: Yellow wristband and red socks/Bed in lowest position, wheels locked, appropriate side rails in place/Call bell, personal items and telephone in reach/Instruct patient to call for assistance before getting out of bed or chair/Non-slip footwear when patient is out of bed/[place] to call system/Physically safe environment - no spills, clutter or unnecessary equipment/Purposeful Proactive Rounding/Room/bathroom lighting operational, light cord in reach Communicate Risk of Fall with Harm to all staff/Monitor for mental status changes/Reinforce activity limits and safety measures with patient and family/Tailored Fall Risk Interventions/Visual Cue: Yellow wristband and red socks/Bed in lowest position, wheels locked, appropriate side rails in place/Call bell, personal items and telephone in reach/Instruct patient to call for assistance before getting out of bed or chair/Non-slip footwear when patient is out of bed/[place] to call system/Physically safe environment - no spills, clutter or unnecessary equipment/Purposeful Proactive Rounding/Room/bathroom lighting operational, light cord in reach

## 2023-12-28 NOTE — ED ADULT NURSE REASSESSMENT NOTE - NS ED NURSE REASSESS COMMENT FT1
attempted report to [phone] at this time. as per  nurse cannot come to phone at this time. asked to call back in 10 minutes

## 2023-12-28 NOTE — H&P ADULT - NSHPPHYSICALEXAM_GEN_ALL_CORE
T(C): 37.3 (12-27-23 @ 18:12), Max: 37.4 (12-27-23 @ 15:56)  HR: 86 (12-28-23 @ 01:03) (80 - 92)  BP: 156/65 (12-28-23 @ 01:03) (141/65 - 167/85)  RR: 20 (12-28-23 @ 01:03) (19 - 20)  SpO2: 100% (12-28-23 @ 01:03) (85% - 100%)    CONSTITUTIONAL: No apparent distress  EYES: PERRLA and symmetric, EOMI, No conjunctival or scleral injection, non-icteric  ENMT: Oral mucosa with moist membranes. Normal dentition; no pharyngeal injection or exudates  NECK: Supple, symmetric and without tracheal deviation   RESP: No respiratory distress, diffuse bilateral wheezing and crackles  CV: RRR, +S1S2, No murmur heard over wheezing, +2 LLE R>>L upto knees  GI: Soft, NT, ND, no rebound, no guarding;  SKIN: No rashes or ulcers noted  NEURO: AOx3, no focal deficits

## 2023-12-28 NOTE — H&P ADULT - HISTORY OF PRESENT ILLNESS
59 yo man active smoke, known COPD, hx of Rrt DVT, factor V Leiden,  HTN, DL, and hx of AVR (Bioprosthetic , 2017) presenting for confusion and slurred speech which was noticed by his family.  He admits to feeling worsening shortness of breath with increasing cough and sputum associated with worsening lower ext swelling, orthopnea and PND over past 3-4 days. Patient tis not compliant with his lasix  or his inhalers. Denies purulent sputum, sick contacts, fever, chills, chest pain/    In ED he was found to be hypoxemic and started on O2 then switched to BiPAP after he developed Respiratory acidosis.Work up showed pulmonary congestion, elevated Pro-BNP  CTA showed no PE.  He was given lasix and admitted for further management     61 yo man active smoke, known COPD, hx of Rrt DVT, factor V Leiden,  HTN, DL, and hx of AVR (Bioprosthetic , 2017) presenting for confusion and slurred speech which was noticed by his family.  He admits to feeling worsening shortness of breath with increasing cough and sputum associated with worsening lower ext swelling, orthopnea and PND over past 3-4 days. Patient tis not compliant with his lasix  or his inhalers. Denies purulent sputum, sick contacts, fever, chills, chest pain/    In ED he was found to be hypoxemic and started on O2 then switched to BiPAP after he developed Respiratory acidosis.Work up showed pulmonary congestion, elevated Pro-BNP  CTA showed no PE.  He was given lasix and admitted for further management     61 yo man active smoke, known COPD on BiPAP at night, hx of Rrt DVT, factor V Leiden,  HTN, DL, and hx of AVR (Bioprosthetic , 2017) presenting for confusion and slurred speech which was noticed by his family.  He admits to feeling worsening shortness of breath with increasing cough and sputum associated with worsening lower ext swelling, orthopnea and PND over past 3-4 days. Patient tis not compliant with BiPAP, lasix  or inhalers. Denies purulent sputum, sick contacts, fever, chills, chest pain/    In ED he was found to be hypoxemic and started on O2 then switched to BiPAP after he developed Respiratory acidosis.Work up showed pulmonary congestion, elevated Pro-BNP  CTA showed no PE.  He was given lasix and admitted for further management     59 yo man active smoke, known COPD on BiPAP at night, hx of Rrt DVT, factor V Leiden,  HTN, DL, and hx of AVR (Bioprosthetic , 2017) presenting for confusion and slurred speech which was noticed by his family.  He admits to feeling worsening shortness of breath with increasing cough and sputum associated with worsening lower ext swelling, orthopnea and PND over past 3-4 days. Patient tis not compliant with BiPAP, lasix  or inhalers. Denies purulent sputum, sick contacts, fever, chills, chest pain/    In ED he was found to be hypoxemic and started on O2 then switched to BiPAP after he developed Respiratory acidosis.Work up showed pulmonary congestion, elevated Pro-BNP  CTA showed no PE.  He was given lasix and admitted for further management

## 2023-12-28 NOTE — ED ADULT NURSE NOTE - CHIEF COMPLAINT QUOTE
slurred speech and confusion for a few days according to wife. pt has history of pe and is on Xarelto. pt states he has been coughing for a few days

## 2023-12-28 NOTE — H&P ADULT - ASSESSMENT
61 yo man active smoke, known COPD, hx of Rrt DVT, factor V Leiden,  HTN, DL, and hx of AVR (Bioprosthetic , 2017) presenting Hypoxic, hyupercapnic respiratory failure 2/2 AECOPD and Decompensated HFpEF    #AHRF  #Acute respiratory acidosis  #AECOPD  #AMS/CO2 Narcosis - resolved at time of evaluation  - C/w BD  - solumedrol IV 125mg once then 60bid followed by taper  - azithromycin 500mg qd  - O2 to target SpO2 89-92%  - BiPAP as needed    #Decompensated HF  #AVR  #HTN  - Pro-BNP elevated  - ECG and trops unremarkable  - c/w lasix 40mg bid  - c/w metoprolol  - c/w valsartan and nifedipine  - hold hctz  - TTE  - TSH  - strick Is and Os   - O2 and BIPAP as needed    #hyperkalemia on labs  - 2/2 acidosis  - repeat K on ABGs showed K 4.7 with normal pH  - c/w with valsartan    #RT LE DVT  #Factor v leiden  -c/w xarelto    #DL  c/w atorva    stable for floors but a high risk for worsening and upgrade to step down 59 yo man active smoke, known COPD, hx of Rrt DVT, factor V Leiden,  HTN, DL, and hx of AVR (Bioprosthetic , 2017) presenting Hypoxic, hyupercapnic respiratory failure 2/2 AECOPD and Decompensated HFpEF    #AHRF  #Acute respiratory acidosis  #AECOPD  #AMS/CO2 Narcosis - resolved at time of evaluation  - C/w BD  - solumedrol IV 125mg once then 60bid followed by taper  - azithromycin 500mg qd  - O2 to target SpO2 89-92%  - BiPAP as needed    #Decompensated HF  #AVR  #HTN  - Pro-BNP elevated  - ECG and trops unremarkable  - c/w lasix 40mg bid  - c/w metoprolol  - c/w valsartan and nifedipine  - hold hctz  - TTE  - TSH  - strick Is and Os   - O2 and BIPAP as needed    #hyperkalemia on labs  - 2/2 acidosis  - repeat K on ABGs showed K 4.7 with normal pH  - c/w with valsartan    #RT LE DVT  #Factor v leiden  -c/w xarelto    #DL  c/w atorva    stable for floors but a high risk for worsening and upgrade to step down

## 2023-12-28 NOTE — PROGRESS NOTE ADULT - SUBJECTIVE AND OBJECTIVE BOX
DAVID PETERSON  68y  Male      Patient is a 68y old  Male who presents with a chief complaint of SOB (28 Dec 2023 03:48)    61 yo man active smoke, known COPD on BiPAP at night, hx of Rrt DVT, factor V Leiden,  HTN, DL, and hx of AVR (Bioprosthetic , 2017) presenting for confusion and slurred speech which was noticed by his family.  He admits to feeling worsening shortness of breath with increasing cough and sputum associated with worsening lower ext swelling, orthopnea and PND over past 3-4 days. Patient tis not compliant with BiPAP, lasix  or inhalers. Denies purulent sputum, sick contacts, fever, chills, chest pain/    In ED he was found to be hypoxemic and started on O2 then switched to BiPAP after he developed Respiratory acidosis.Work up showed pulmonary congestion, elevated Pro-BNP  CTA showed no PE.  He was given lasix and admitted for further management      REVIEW OF SYSTEMS:  CONSTITUTIONAL: No fever, weight loss, or fatigue  EYES: No eye pain, visual disturbances, or discharge  ENMT:  No difficulty hearing, tinnitus, vertigo; No sinus or throat pain  NECK: No pain or stiffness  BREASTS: No pain, masses, or nipple discharge  RESPIRATORY: No cough, wheezing, chills or hemoptysis;  shortness of breath+  CARDIOVASCULAR: No chest pain, palpitations, dizziness, or leg swelling  GASTROINTESTINAL: No abdominal or epigastric pain. No nausea, vomiting, or hematemesis; No diarrhea or constipation. No melena or hematochezia.  GENITOURINARY: No dysuria, frequency, hematuria, or incontinence  MUSCULOSKELETAL: No joint pain or swelling; No muscle, back, or extremity pain  PSYCHIATRIC: No depression, anxiety, mood swings, or difficulty sleeping  HEME/LYMPH: No easy bruising, or bleeding gums  ALLERY AND IMMUNOLOGIC: No hives or eczema  FAMILY HISTORY:    T(C): 37 (12-28-23 @ 08:02), Max: 37.4 (12-27-23 @ 15:56)  HR: 81 (12-28-23 @ 08:02) (78 - 92)  BP: 134/77 (12-28-23 @ 08:02) (128/58 - 167/85)  RR: 20 (12-28-23 @ 08:02) (19 - 20)  SpO2: 100% (12-28-23 @ 08:02) (85% - 100%)  Wt(kg): --Vital Signs Last 24 Hrs  T(C): 37 (28 Dec 2023 08:02), Max: 37.4 (27 Dec 2023 15:56)  T(F): 98.6 (28 Dec 2023 08:02), Max: 99.4 (27 Dec 2023 15:56)  HR: 81 (28 Dec 2023 08:02) (78 - 92)  BP: 134/77 (28 Dec 2023 08:02) (128/58 - 167/85)  BP(mean): --  RR: 20 (28 Dec 2023 08:02) (19 - 20)  SpO2: 100% (28 Dec 2023 08:02) (85% - 100%)    Parameters below as of 28 Dec 2023 08:02  Patient On (Oxygen Delivery Method): BiPAP/CPAP      No Known Allergies      PHYSICAL EXAM:  GENERAL: NAD,   HEAD:  Atraumatic, Normocephalic  EYES: EOMI, PERRLA, conjunctiva and sclera clear  ENMT: No tonsillar erythema, exudates, or enlargement;   NECK: Supple, No JVD, Normal thyroid  NERVOUS SYSTEM:  Alert & Oriented X3, Good concentration;   CHEST/LUNG: vbs bilateral ronchi+  HEART: Regular rate and rhythm; No murmurs, rubs, or gallops  ABDOMEN: Soft, Nontender, Nondistended; Bowel sounds present  EXTREMITIES:  , No clubbing, cyanosis, or edema  LYMPH: No lymphadenopathy noted  SKIN: No rashes or lesions      LABS:  12-27    144  |  103  |  19  ----------------------------<  101<H>  5.5<H>   |  33<H>  |  1.0    Ca    9.2      27 Dec 2023 20:27  Mg     2.5     12-27    TPro  7.2  /  Alb  3.8  /  TBili  <0.2  /  DBili  x   /  AST  31  /  ALT  35  /  AlkPhos  139<H>  12-27                          13.4   7.19  )-----------( 262      ( 27 Dec 2023 20:27 )             42.6         < from: CT Angio Chest PE Protocol w/ IV Cont (12.27.23 @ 21:07) >  1.  No evidence of acute pulmonary embolus.  2.  No acute lobar consolidation.  3.  Small patchy groundglass density in the right upper lobe adjacent to   a slightly thickened bronchiole,nonspecific though likely related to   small airways inflammation.  4.  Few right perihilar lymph nodes, likely reactive.    --- End of Report ---    < end of copied text >  RADIOLOGY & ADDITIONAL TESTS:    MEDICATION:  albuterol/ipratropium for Nebulization 3 milliLiter(s) Nebulizer every 6 hours  atorvastatin 80 milliGRAM(s) Oral at bedtime  azithromycin  IVPB 500 milliGRAM(s) IV Intermittent every 24 hours  budesonide 160 MICROgram(s)/formoterol 4.5 MICROgram(s) Inhaler 2 Puff(s) Inhalation two times a day  furosemide   Injectable 40 milliGRAM(s) IV Push two times a day  methylPREDNISolone sodium succinate Injectable 60 milliGRAM(s) IV Push every 12 hours  metoprolol succinate  milliGRAM(s) Oral daily  NIFEdipine XL 60 milliGRAM(s) Oral daily  pantoprazole    Tablet 40 milliGRAM(s) Oral before breakfast  rivaroxaban 20 milliGRAM(s) Oral with dinner  tiotropium 2.5 MICROgram(s) Inhaler 2 Puff(s) Inhalation daily  valsartan 320 milliGRAM(s) Oral daily      HEALTH ISSUES - PROBLEM Dx:  61 yo man active smoke, known COPD, hx of Rrt DVT, factor V Leiden,  HTN, DL, and hx of AVR (Bioprosthetic , 2017) presenting Hypoxic, hyupercapnic respiratory failure 2/2 AECOPD and Decompensated HFpEF    #AHRF  #Acute respiratory acidosis  #AECOPD  #AMS/CO2 Narcosis - resolved at time of evaluation  - C/w BD  - solumedrol IV 125mg once then 60bid followed by taper  - azithromycin 500mg qd  - O2 to target SpO2 89-92%  - BiPAP     #Decompensated HF  #AVR  #HTN  - Pro-BNP elevated  - ECG and trops unremarkable  - c/w lasix 40mg daily  - c/w metoprolol  - c/w valsartan and nifedipine  - hold hctz  - TTE  - TSH  - strick Is and Os   - O2 and BIPAP as needed    #hyperkalemia on labs repeat  hx Rt DVT/factor v leiden on rivaroxaban         DAVID PETERSON  68y  Male      Patient is a 68y old  Male who presents with a chief complaint of SOB (28 Dec 2023 03:48)    59 yo man active smoke, known COPD on BiPAP at night, hx of Rrt DVT, factor V Leiden,  HTN, DL, and hx of AVR (Bioprosthetic , 2017) presenting for confusion and slurred speech which was noticed by his family.  He admits to feeling worsening shortness of breath with increasing cough and sputum associated with worsening lower ext swelling, orthopnea and PND over past 3-4 days. Patient tis not compliant with BiPAP, lasix  or inhalers. Denies purulent sputum, sick contacts, fever, chills, chest pain/    In ED he was found to be hypoxemic and started on O2 then switched to BiPAP after he developed Respiratory acidosis.Work up showed pulmonary congestion, elevated Pro-BNP  CTA showed no PE.  He was given lasix and admitted for further management      REVIEW OF SYSTEMS:  CONSTITUTIONAL: No fever, weight loss, or fatigue  EYES: No eye pain, visual disturbances, or discharge  ENMT:  No difficulty hearing, tinnitus, vertigo; No sinus or throat pain  NECK: No pain or stiffness  BREASTS: No pain, masses, or nipple discharge  RESPIRATORY: No cough, wheezing, chills or hemoptysis;  shortness of breath+  CARDIOVASCULAR: No chest pain, palpitations, dizziness, or leg swelling  GASTROINTESTINAL: No abdominal or epigastric pain. No nausea, vomiting, or hematemesis; No diarrhea or constipation. No melena or hematochezia.  GENITOURINARY: No dysuria, frequency, hematuria, or incontinence  MUSCULOSKELETAL: No joint pain or swelling; No muscle, back, or extremity pain  PSYCHIATRIC: No depression, anxiety, mood swings, or difficulty sleeping  HEME/LYMPH: No easy bruising, or bleeding gums  ALLERY AND IMMUNOLOGIC: No hives or eczema  FAMILY HISTORY:    T(C): 37 (12-28-23 @ 08:02), Max: 37.4 (12-27-23 @ 15:56)  HR: 81 (12-28-23 @ 08:02) (78 - 92)  BP: 134/77 (12-28-23 @ 08:02) (128/58 - 167/85)  RR: 20 (12-28-23 @ 08:02) (19 - 20)  SpO2: 100% (12-28-23 @ 08:02) (85% - 100%)  Wt(kg): --Vital Signs Last 24 Hrs  T(C): 37 (28 Dec 2023 08:02), Max: 37.4 (27 Dec 2023 15:56)  T(F): 98.6 (28 Dec 2023 08:02), Max: 99.4 (27 Dec 2023 15:56)  HR: 81 (28 Dec 2023 08:02) (78 - 92)  BP: 134/77 (28 Dec 2023 08:02) (128/58 - 167/85)  BP(mean): --  RR: 20 (28 Dec 2023 08:02) (19 - 20)  SpO2: 100% (28 Dec 2023 08:02) (85% - 100%)    Parameters below as of 28 Dec 2023 08:02  Patient On (Oxygen Delivery Method): BiPAP/CPAP      No Known Allergies      PHYSICAL EXAM:  GENERAL: NAD,   HEAD:  Atraumatic, Normocephalic  EYES: EOMI, PERRLA, conjunctiva and sclera clear  ENMT: No tonsillar erythema, exudates, or enlargement;   NECK: Supple, No JVD, Normal thyroid  NERVOUS SYSTEM:  Alert & Oriented X3, Good concentration;   CHEST/LUNG: vbs bilateral ronchi+  HEART: Regular rate and rhythm; No murmurs, rubs, or gallops  ABDOMEN: Soft, Nontender, Nondistended; Bowel sounds present  EXTREMITIES:  , No clubbing, cyanosis, or edema  LYMPH: No lymphadenopathy noted  SKIN: No rashes or lesions      LABS:  12-27    144  |  103  |  19  ----------------------------<  101<H>  5.5<H>   |  33<H>  |  1.0    Ca    9.2      27 Dec 2023 20:27  Mg     2.5     12-27    TPro  7.2  /  Alb  3.8  /  TBili  <0.2  /  DBili  x   /  AST  31  /  ALT  35  /  AlkPhos  139<H>  12-27                          13.4   7.19  )-----------( 262      ( 27 Dec 2023 20:27 )             42.6         < from: CT Angio Chest PE Protocol w/ IV Cont (12.27.23 @ 21:07) >  1.  No evidence of acute pulmonary embolus.  2.  No acute lobar consolidation.  3.  Small patchy groundglass density in the right upper lobe adjacent to   a slightly thickened bronchiole,nonspecific though likely related to   small airways inflammation.  4.  Few right perihilar lymph nodes, likely reactive.    --- End of Report ---    < end of copied text >  RADIOLOGY & ADDITIONAL TESTS:    MEDICATION:  albuterol/ipratropium for Nebulization 3 milliLiter(s) Nebulizer every 6 hours  atorvastatin 80 milliGRAM(s) Oral at bedtime  azithromycin  IVPB 500 milliGRAM(s) IV Intermittent every 24 hours  budesonide 160 MICROgram(s)/formoterol 4.5 MICROgram(s) Inhaler 2 Puff(s) Inhalation two times a day  furosemide   Injectable 40 milliGRAM(s) IV Push two times a day  methylPREDNISolone sodium succinate Injectable 60 milliGRAM(s) IV Push every 12 hours  metoprolol succinate  milliGRAM(s) Oral daily  NIFEdipine XL 60 milliGRAM(s) Oral daily  pantoprazole    Tablet 40 milliGRAM(s) Oral before breakfast  rivaroxaban 20 milliGRAM(s) Oral with dinner  tiotropium 2.5 MICROgram(s) Inhaler 2 Puff(s) Inhalation daily  valsartan 320 milliGRAM(s) Oral daily      HEALTH ISSUES - PROBLEM Dx:  59 yo man active smoke, known COPD, hx of Rrt DVT, factor V Leiden,  HTN, DL, and hx of AVR (Bioprosthetic , 2017) presenting Hypoxic, hyupercapnic respiratory failure 2/2 AECOPD and Decompensated HFpEF    #AHRF  #Acute respiratory acidosis  #AECOPD  #AMS/CO2 Narcosis - resolved at time of evaluation  - C/w BD  - solumedrol IV 125mg once then 60bid followed by taper  - azithromycin 500mg qd  - O2 to target SpO2 89-92%  - BiPAP     #Decompensated HF  #AVR  #HTN  - Pro-BNP elevated  - ECG and trops unremarkable  - c/w lasix 40mg daily  - c/w metoprolol  - c/w valsartan and nifedipine  - hold hctz  - TTE  - TSH  - strick Is and Os   - O2 and BIPAP as needed    #hyperkalemia on labs repeat  hx Rt DVT/factor v leiden on rivaroxaban

## 2023-12-29 LAB
ALBUMIN SERPL ELPH-MCNC: 3.3 G/DL — LOW (ref 3.5–5.2)
ALBUMIN SERPL ELPH-MCNC: 3.3 G/DL — LOW (ref 3.5–5.2)
ALP SERPL-CCNC: 124 U/L — HIGH (ref 30–115)
ALP SERPL-CCNC: 124 U/L — HIGH (ref 30–115)
ALT FLD-CCNC: 23 U/L — SIGNIFICANT CHANGE UP (ref 0–41)
ALT FLD-CCNC: 23 U/L — SIGNIFICANT CHANGE UP (ref 0–41)
ANION GAP SERPL CALC-SCNC: 10 MMOL/L — SIGNIFICANT CHANGE UP (ref 7–14)
ANION GAP SERPL CALC-SCNC: 10 MMOL/L — SIGNIFICANT CHANGE UP (ref 7–14)
AST SERPL-CCNC: 20 U/L — SIGNIFICANT CHANGE UP (ref 0–41)
AST SERPL-CCNC: 20 U/L — SIGNIFICANT CHANGE UP (ref 0–41)
BASOPHILS # BLD AUTO: 0.02 K/UL — SIGNIFICANT CHANGE UP (ref 0–0.2)
BASOPHILS # BLD AUTO: 0.02 K/UL — SIGNIFICANT CHANGE UP (ref 0–0.2)
BASOPHILS NFR BLD AUTO: 0.2 % — SIGNIFICANT CHANGE UP (ref 0–1)
BASOPHILS NFR BLD AUTO: 0.2 % — SIGNIFICANT CHANGE UP (ref 0–1)
BILIRUB SERPL-MCNC: <0.2 MG/DL — SIGNIFICANT CHANGE UP (ref 0.2–1.2)
BILIRUB SERPL-MCNC: <0.2 MG/DL — SIGNIFICANT CHANGE UP (ref 0.2–1.2)
BUN SERPL-MCNC: 37 MG/DL — HIGH (ref 10–20)
BUN SERPL-MCNC: 37 MG/DL — HIGH (ref 10–20)
CALCIUM SERPL-MCNC: 8.7 MG/DL — SIGNIFICANT CHANGE UP (ref 8.4–10.4)
CALCIUM SERPL-MCNC: 8.7 MG/DL — SIGNIFICANT CHANGE UP (ref 8.4–10.4)
CHLORIDE SERPL-SCNC: 102 MMOL/L — SIGNIFICANT CHANGE UP (ref 98–110)
CHLORIDE SERPL-SCNC: 102 MMOL/L — SIGNIFICANT CHANGE UP (ref 98–110)
CO2 SERPL-SCNC: 31 MMOL/L — SIGNIFICANT CHANGE UP (ref 17–32)
CO2 SERPL-SCNC: 31 MMOL/L — SIGNIFICANT CHANGE UP (ref 17–32)
CREAT SERPL-MCNC: 1.2 MG/DL — SIGNIFICANT CHANGE UP (ref 0.7–1.5)
CREAT SERPL-MCNC: 1.2 MG/DL — SIGNIFICANT CHANGE UP (ref 0.7–1.5)
EGFR: 66 ML/MIN/1.73M2 — SIGNIFICANT CHANGE UP
EGFR: 66 ML/MIN/1.73M2 — SIGNIFICANT CHANGE UP
EOSINOPHIL # BLD AUTO: 0 K/UL — SIGNIFICANT CHANGE UP (ref 0–0.7)
EOSINOPHIL # BLD AUTO: 0 K/UL — SIGNIFICANT CHANGE UP (ref 0–0.7)
EOSINOPHIL NFR BLD AUTO: 0 % — SIGNIFICANT CHANGE UP (ref 0–8)
EOSINOPHIL NFR BLD AUTO: 0 % — SIGNIFICANT CHANGE UP (ref 0–8)
FOLATE SERPL-MCNC: 8.1 NG/ML — SIGNIFICANT CHANGE UP
FOLATE SERPL-MCNC: 8.1 NG/ML — SIGNIFICANT CHANGE UP
GLUCOSE SERPL-MCNC: 120 MG/DL — HIGH (ref 70–99)
GLUCOSE SERPL-MCNC: 120 MG/DL — HIGH (ref 70–99)
HCT VFR BLD CALC: 39.9 % — LOW (ref 42–52)
HCT VFR BLD CALC: 39.9 % — LOW (ref 42–52)
HCV AB S/CO SERPL IA: 0.04 COI — SIGNIFICANT CHANGE UP
HCV AB S/CO SERPL IA: 0.04 COI — SIGNIFICANT CHANGE UP
HCV AB SERPL-IMP: SIGNIFICANT CHANGE UP
HCV AB SERPL-IMP: SIGNIFICANT CHANGE UP
HGB BLD-MCNC: 12.7 G/DL — LOW (ref 14–18)
HGB BLD-MCNC: 12.7 G/DL — LOW (ref 14–18)
IMM GRANULOCYTES NFR BLD AUTO: 0.9 % — HIGH (ref 0.1–0.3)
IMM GRANULOCYTES NFR BLD AUTO: 0.9 % — HIGH (ref 0.1–0.3)
LYMPHOCYTES # BLD AUTO: 0.53 K/UL — LOW (ref 1.2–3.4)
LYMPHOCYTES # BLD AUTO: 0.53 K/UL — LOW (ref 1.2–3.4)
LYMPHOCYTES # BLD AUTO: 6.1 % — LOW (ref 20.5–51.1)
LYMPHOCYTES # BLD AUTO: 6.1 % — LOW (ref 20.5–51.1)
MAGNESIUM SERPL-MCNC: 2.5 MG/DL — HIGH (ref 1.8–2.4)
MAGNESIUM SERPL-MCNC: 2.5 MG/DL — HIGH (ref 1.8–2.4)
MCHC RBC-ENTMCNC: 31.8 G/DL — LOW (ref 32–37)
MCHC RBC-ENTMCNC: 31.8 G/DL — LOW (ref 32–37)
MCHC RBC-ENTMCNC: 33.3 PG — HIGH (ref 27–31)
MCHC RBC-ENTMCNC: 33.3 PG — HIGH (ref 27–31)
MCV RBC AUTO: 104.7 FL — HIGH (ref 80–94)
MCV RBC AUTO: 104.7 FL — HIGH (ref 80–94)
MONOCYTES # BLD AUTO: 0.56 K/UL — SIGNIFICANT CHANGE UP (ref 0.1–0.6)
MONOCYTES # BLD AUTO: 0.56 K/UL — SIGNIFICANT CHANGE UP (ref 0.1–0.6)
MONOCYTES NFR BLD AUTO: 6.5 % — SIGNIFICANT CHANGE UP (ref 1.7–9.3)
MONOCYTES NFR BLD AUTO: 6.5 % — SIGNIFICANT CHANGE UP (ref 1.7–9.3)
NEUTROPHILS # BLD AUTO: 7.49 K/UL — HIGH (ref 1.4–6.5)
NEUTROPHILS # BLD AUTO: 7.49 K/UL — HIGH (ref 1.4–6.5)
NEUTROPHILS NFR BLD AUTO: 86.3 % — HIGH (ref 42.2–75.2)
NEUTROPHILS NFR BLD AUTO: 86.3 % — HIGH (ref 42.2–75.2)
NRBC # BLD: 0 /100 WBCS — SIGNIFICANT CHANGE UP (ref 0–0)
NRBC # BLD: 0 /100 WBCS — SIGNIFICANT CHANGE UP (ref 0–0)
PLATELET # BLD AUTO: 262 K/UL — SIGNIFICANT CHANGE UP (ref 130–400)
PLATELET # BLD AUTO: 262 K/UL — SIGNIFICANT CHANGE UP (ref 130–400)
PMV BLD: 9.6 FL — SIGNIFICANT CHANGE UP (ref 7.4–10.4)
PMV BLD: 9.6 FL — SIGNIFICANT CHANGE UP (ref 7.4–10.4)
POTASSIUM SERPL-MCNC: 5 MMOL/L — SIGNIFICANT CHANGE UP (ref 3.5–5)
POTASSIUM SERPL-MCNC: 5 MMOL/L — SIGNIFICANT CHANGE UP (ref 3.5–5)
POTASSIUM SERPL-SCNC: 5 MMOL/L — SIGNIFICANT CHANGE UP (ref 3.5–5)
POTASSIUM SERPL-SCNC: 5 MMOL/L — SIGNIFICANT CHANGE UP (ref 3.5–5)
PROT SERPL-MCNC: 6.5 G/DL — SIGNIFICANT CHANGE UP (ref 6–8)
PROT SERPL-MCNC: 6.5 G/DL — SIGNIFICANT CHANGE UP (ref 6–8)
RBC # BLD: 3.81 M/UL — LOW (ref 4.7–6.1)
RBC # BLD: 3.81 M/UL — LOW (ref 4.7–6.1)
RBC # FLD: 14 % — SIGNIFICANT CHANGE UP (ref 11.5–14.5)
RBC # FLD: 14 % — SIGNIFICANT CHANGE UP (ref 11.5–14.5)
SODIUM SERPL-SCNC: 143 MMOL/L — SIGNIFICANT CHANGE UP (ref 135–146)
SODIUM SERPL-SCNC: 143 MMOL/L — SIGNIFICANT CHANGE UP (ref 135–146)
TSH SERPL-MCNC: 0.99 UIU/ML — SIGNIFICANT CHANGE UP (ref 0.27–4.2)
TSH SERPL-MCNC: 0.99 UIU/ML — SIGNIFICANT CHANGE UP (ref 0.27–4.2)
VIT B12 SERPL-MCNC: 647 PG/ML — SIGNIFICANT CHANGE UP (ref 232–1245)
VIT B12 SERPL-MCNC: 647 PG/ML — SIGNIFICANT CHANGE UP (ref 232–1245)
WBC # BLD: 8.68 K/UL — SIGNIFICANT CHANGE UP (ref 4.8–10.8)
WBC # BLD: 8.68 K/UL — SIGNIFICANT CHANGE UP (ref 4.8–10.8)
WBC # FLD AUTO: 8.68 K/UL — SIGNIFICANT CHANGE UP (ref 4.8–10.8)
WBC # FLD AUTO: 8.68 K/UL — SIGNIFICANT CHANGE UP (ref 4.8–10.8)

## 2023-12-29 PROCEDURE — 93306 TTE W/DOPPLER COMPLETE: CPT | Mod: 26

## 2023-12-29 PROCEDURE — 70551 MRI BRAIN STEM W/O DYE: CPT | Mod: 26

## 2023-12-29 PROCEDURE — 70450 CT HEAD/BRAIN W/O DYE: CPT | Mod: 26

## 2023-12-29 PROCEDURE — 93880 EXTRACRANIAL BILAT STUDY: CPT | Mod: 26

## 2023-12-29 PROCEDURE — 99223 1ST HOSP IP/OBS HIGH 75: CPT | Mod: GC

## 2023-12-29 PROCEDURE — 71045 X-RAY EXAM CHEST 1 VIEW: CPT | Mod: 26

## 2023-12-29 RX ORDER — BUDESONIDE AND FORMOTEROL FUMARATE DIHYDRATE 160; 4.5 UG/1; UG/1
2 AEROSOL RESPIRATORY (INHALATION)
Refills: 0 | Status: DISCONTINUED | OUTPATIENT
Start: 2023-12-29 | End: 2024-01-02

## 2023-12-29 RX ORDER — IPRATROPIUM/ALBUTEROL SULFATE 18-103MCG
3 AEROSOL WITH ADAPTER (GRAM) INHALATION EVERY 4 HOURS
Refills: 0 | Status: DISCONTINUED | OUTPATIENT
Start: 2023-12-29 | End: 2024-01-02

## 2023-12-29 RX ORDER — TIOTROPIUM BROMIDE 18 UG/1
2 CAPSULE ORAL; RESPIRATORY (INHALATION) DAILY
Refills: 0 | Status: DISCONTINUED | OUTPATIENT
Start: 2023-12-29 | End: 2024-01-02

## 2023-12-29 RX ORDER — FUROSEMIDE 40 MG
40 TABLET ORAL DAILY
Refills: 0 | Status: DISCONTINUED | OUTPATIENT
Start: 2023-12-30 | End: 2024-01-02

## 2023-12-29 RX ORDER — NICOTINE POLACRILEX 2 MG
1 GUM BUCCAL DAILY
Refills: 0 | Status: DISCONTINUED | OUTPATIENT
Start: 2023-12-29 | End: 2024-01-02

## 2023-12-29 RX ADMIN — PANTOPRAZOLE SODIUM 40 MILLIGRAM(S): 20 TABLET, DELAYED RELEASE ORAL at 06:04

## 2023-12-29 RX ADMIN — Medication 60 MILLIGRAM(S): at 06:05

## 2023-12-29 RX ADMIN — Medication 3 MILLILITER(S): at 19:48

## 2023-12-29 RX ADMIN — VALSARTAN 320 MILLIGRAM(S): 80 TABLET ORAL at 06:05

## 2023-12-29 RX ADMIN — Medication 100 MILLIGRAM(S): at 06:04

## 2023-12-29 RX ADMIN — Medication 1 PATCH: at 13:29

## 2023-12-29 RX ADMIN — Medication 60 MILLIGRAM(S): at 06:04

## 2023-12-29 RX ADMIN — Medication 40 MILLIGRAM(S): at 06:05

## 2023-12-29 RX ADMIN — Medication 600 MILLIGRAM(S): at 17:31

## 2023-12-29 RX ADMIN — AZITHROMYCIN 255 MILLIGRAM(S): 500 TABLET, FILM COATED ORAL at 21:23

## 2023-12-29 RX ADMIN — Medication 3 MILLILITER(S): at 13:42

## 2023-12-29 RX ADMIN — Medication 3 MILLILITER(S): at 07:46

## 2023-12-29 RX ADMIN — ATORVASTATIN CALCIUM 80 MILLIGRAM(S): 80 TABLET, FILM COATED ORAL at 21:28

## 2023-12-29 RX ADMIN — Medication 1 PATCH: at 19:45

## 2023-12-29 RX ADMIN — ATORVASTATIN CALCIUM 80 MILLIGRAM(S): 80 TABLET, FILM COATED ORAL at 00:33

## 2023-12-29 RX ADMIN — RIVAROXABAN 20 MILLIGRAM(S): KIT at 17:31

## 2023-12-29 NOTE — PROVIDER CONTACT NOTE (OTHER) - ACTION/TREATMENT ORDERED:
As per Diamond Bhatia MD ,patients symptoms can be from c02 retention and no need for head ct right now.

## 2023-12-29 NOTE — CONSULT NOTE ADULT - SUBJECTIVE AND OBJECTIVE BOX
Patient is a 68y old  Male who presents with a chief complaint of SOB (29 Dec 2023 10:19)    HPI:  61 yo man active smoke, known COPD on BiPAP at night, hx of Rrt DVT, factor V Leiden,  HTN, DL, and hx of AVR (Bioprosthetic , 2017) presenting for confusion and slurred speech which was noticed by his family.  He admits to feeling worsening shortness of breath with increasing cough and sputum associated with worsening lower ext swelling, orthopnea and PND over past 3-4 days. Patient tis not compliant with BiPAP, lasix  or inhalers. Denies purulent sputum, sick contacts, fever, chills, chest pain/    In ED he was found to be hypoxemic and started on O2 then switched to BiPAP after he developed Respiratory acidosis.Work up showed pulmonary congestion, elevated Pro-BNP  CTA showed no PE.  He was given lasix and admitted for further management     (28 Dec 2023 03:48)      PAST MEDICAL & SURGICAL HISTORY:  HTN (hypertension)      Dyslipidemia      Factor V Leiden      DVT, lower extremity      COPD, moderate      S/P AVR (aortic valve replacement)          SOCIAL HX:   Smoking        30+ pack/years, stopped 3 months ago                 ETOH                            Other    FAMILY HISTORY:  .  No cardiovascular or pulmonary family history     REVIEW OF SYSTEMS:    All ROS are negative exept per HPI       Allergies    No Known Allergies    Intolerances          PHYSICAL EXAM  Vital Signs Last 24 Hrs  T(C): 36.9 (29 Dec 2023 07:12), Max: 37.4 (28 Dec 2023 20:07)  T(F): 98.5 (29 Dec 2023 07:12), Max: 99.4 (28 Dec 2023 20:07)  HR: 94 (29 Dec 2023 07:12) (83 - 103)  BP: 142/72 (29 Dec 2023 07:12) (142/72 - 170/80)  BP(mean): 102 (28 Dec 2023 16:21) (102 - 102)  RR: 18 (29 Dec 2023 07:12) (18 - 20)  SpO2: 98% (29 Dec 2023 07:12) (95% - 100%)    Parameters below as of 29 Dec 2023 07:12  Patient On (Oxygen Delivery Method): nasal cannula  O2 Flow (L/min): 4      CONSTITUTIONAL:  Well nourished.  NAD    CARDIAC:   Normal rate,   regular rhythm.    BL pitting edema    RESPIRATORY:   Bl wheezing     GASTROINTESTINAL:  Abdomen soft, non-tender,   No guarding,   Positive BS    MUSCULOSKELETAL:   Range of motion is not limited,  No clubbing, cyanosis    NEUROLOGICAL:   Alert and oriented   No motor deficits.    SKIN:   Skin normal color for race,   No evidence of rash.          LABS:                          12.7   8.68  )-----------( 262      ( 29 Dec 2023 06:06 )             39.9                                               12-29    143  |  102  |  37<H>  ----------------------------<  120<H>  5.0   |  31  |  1.2    Ca    8.7      29 Dec 2023 06:06  Mg     2.5     12-29    TPro  6.5  /  Alb  3.3<L>  /  TBili  <0.2  /  DBili  x   /  AST  20  /  ALT  23  /  AlkPhos  124<H>  12-29                                             Urinalysis Basic - ( 29 Dec 2023 06:06 )    Color: x / Appearance: x / SG: x / pH: x  Gluc: 120 mg/dL / Ketone: x  / Bili: x / Urobili: x   Blood: x / Protein: x / Nitrite: x   Leuk Esterase: x / RBC: x / WBC x   Sq Epi: x / Non Sq Epi: x / Bacteria: x                                                  LIVER FUNCTIONS - ( 29 Dec 2023 06:06 )  Alb: 3.3 g/dL / Pro: 6.5 g/dL / ALK PHOS: 124 U/L / ALT: 23 U/L / AST: 20 U/L / GGT: x                                                                                            ABG - ( 28 Dec 2023 03:46 )  pH, Arterial: 7.40  pH, Blood: x     /  pCO2: 55    /  pO2: 55    / HCO3: 34    / Base Excess: 7.5   /  SaO2: 84.1                MEDICATIONS  (STANDING):  albuterol/ipratropium for Nebulization 3 milliLiter(s) Nebulizer every 6 hours  atorvastatin 80 milliGRAM(s) Oral at bedtime  azithromycin  IVPB 500 milliGRAM(s) IV Intermittent every 24 hours  budesonide 160 MICROgram(s)/formoterol 4.5 MICROgram(s) Inhaler 2 Puff(s) Inhalation two times a day  furosemide   Injectable 40 milliGRAM(s) IV Push daily  guaiFENesin  milliGRAM(s) Oral every 12 hours  methylPREDNISolone sodium succinate Injectable 60 milliGRAM(s) IV Push every 12 hours  metoprolol succinate  milliGRAM(s) Oral daily  nicotine -  14 mG/24Hr(s) Patch 1 Patch Transdermal daily  NIFEdipine XL 60 milliGRAM(s) Oral daily  pantoprazole    Tablet 40 milliGRAM(s) Oral before breakfast  rivaroxaban 20 milliGRAM(s) Oral with dinner  tiotropium 2.5 MICROgram(s) Inhaler 2 Puff(s) Inhalation daily  valsartan 320 milliGRAM(s) Oral daily    MEDICATIONS  (PRN):      X-Rays reviewed:    CXR interpreted by me:

## 2023-12-29 NOTE — CONSULT NOTE ADULT - ASSESSMENT
pt s/p avr, htn. pt admitted w/ sob c/w pulm  etio. pt w/o mi. pt w/ elevated bnp no significant edema in lungs noted. check echo. pt on lasix. advise add farxiga. follow o2, i and o, renal status. pulm f/u.

## 2023-12-29 NOTE — CONSULT NOTE ADULT - CARDIOVASCULAR
DISPLAY PLAN FREE TEXT DISPLAY PLAN FREE TEXT DISPLAY PLAN FREE TEXT DISPLAY PLAN FREE TEXT DISPLAY PLAN FREE TEXT DISPLAY PLAN FREE TEXT DISPLAY PLAN FREE TEXT No complaints murmur DISPLAY PLAN FREE TEXT DISPLAY PLAN FREE TEXT DISPLAY PLAN FREE TEXT DISPLAY PLAN FREE TEXT DISPLAY PLAN FREE TEXT DISPLAY PLAN FREE TEXT DISPLAY PLAN FREE TEXT DISPLAY PLAN FREE TEXT DISPLAY PLAN FREE TEXT DISPLAY PLAN FREE TEXT DISPLAY PLAN FREE TEXT DISPLAY PLAN FREE TEXT DISPLAY PLAN FREE TEXT DISPLAY PLAN FREE TEXT details… DISPLAY PLAN FREE TEXT DISPLAY PLAN FREE TEXT DISPLAY PLAN FREE TEXT DISPLAY PLAN FREE TEXT DISPLAY PLAN FREE TEXT DISPLAY PLAN FREE TEXT

## 2023-12-29 NOTE — PROVIDER CONTACT NOTE (OTHER) - REASON
according to note , patient came in with slurred speech , no stroke workup done , patient has blood clotting disorder

## 2023-12-29 NOTE — PROGRESS NOTE ADULT - SUBJECTIVE AND OBJECTIVE BOX
Chart reviewed, patient examined. Pertinent results reviewed.  Case discussed with HO; specialist f/u reviewed  HD#3; much improved since admission.    Patient is a 68y old Male who presents with a chief complaint of SOB (28 Dec 2023 09:00)    Primary diagnosis of COPD with respiratory distress, acute; Encephalopathy 2/2 hypoxia.  All due to URi/ COPD exacerbation.    HPI:  59 yo man active smoke, known COPD on BiPAP at night, hx of Rrt DVT, factor V Leiden,  HTN, DL, and hx of AVR (Bioprosthetic , 2017) presenting for confusion and slurred speech which was noticed by his family.  He admits to feeling worsening shortness of breath with increasing cough and sputum associated with worsening lower ext swelling, orthopnea and PND over past 3-4 days. Patient tis not compliant with BiPAP, lasix  or inhalers. Denies purulent sputum, sick contacts, fever, chills, chest pain/    In ED he was found to be hypoxemic and started on O2 then switched to BiPAP after he developed Respiratory acidosis.Work up showed pulmonary congestion, elevated Pro-BNP  CTA showed no PE.  He was given lasix and admitted for further management       This morning patient was seen and examined at bedside, resting comfortably in bed.    No acute or major events overnight. He used BIPAP overnight. Slept well.         REVIEW OF SYSTEMS:  CONSTITUTIONAL: No fever, weight loss, or fatigue; poor sleep- had recent nightmares;  EYES: No eye pain, visual disturbances, or discharge  ENMT:  No difficulty hearing, tinnitus, vertigo; No sinus or throat pain  NECK: No pain or stiffness  RESPIRATORY: + cough,+ wheezing,+ chills   shortness of breath+  CARDIOVASCULAR: No chest pain, palpitations, dizziness, + leg swelling- better w diuretic and Rx for DVT  GASTROINTESTINAL: No abdominal or epigastric pain. No nausea, vomiting, or hematemesis; No diarrhea or constipation. No melena or hematochezia.  GENITOURINARY: No dysuria, frequency, hematuria, or incontinence; + nocturia  MUSCULOSKELETAL: No joint pain or swelling; No muscle, back, or extremity pain  PSYCHIATRIC: No depression, anxiety, mood swings, or difficulty sleeping  HEME/LYMPH: No easy bruising, or bleeding gums  ALLERY AND IMMUNOLOGIC: No hives or eczema  NEURO: dysartria; felt foggy; ? hallucinations; says + nightmares; no HA        Code Status: Full    PAST MEDICAL & SURGICAL HISTORY  HTN (hypertension)    Dyslipidemia    Factor V Leiden    DVT, lower extremity-unprovoked 4 mos ago- on AC    COPD, moderate    S/P AVR (aortic valve replacement)      SOCIAL HISTORY:  Social History: Still smokes 1/2 PPD  Retired [employer]-9/11 exposure and F/U      ALLERGIES:  No Known Allergies    MEDICATIONS:  STANDING MEDICATIONS  albuterol/ipratropium for Nebulization 3 milliLiter(s) Nebulizer every 6 hours  atorvastatin 80 milliGRAM(s) Oral at bedtime  azithromycin  IVPB 500 milliGRAM(s) IV Intermittent every 24 hours  budesonide 160 MICROgram(s)/formoterol 4.5 MICROgram(s) Inhaler 2 Puff(s) Inhalation two times a day  furosemide   Injectable 40 milliGRAM(s) IV Push daily  methylPREDNISolone sodium succinate Injectable 60 milliGRAM(s) IV Push every 12 hours  metoprolol succinate  milliGRAM(s) Oral daily  NIFEdipine XL 60 milliGRAM(s) Oral daily  pantoprazole    Tablet 40 milliGRAM(s) Oral before breakfast  rivaroxaban 20 milliGRAM(s) Oral with dinner  tiotropium 2.5 MICROgram(s) Inhaler 2 Puff(s) Inhalation daily  valsartan 320 milliGRAM(s) Oral daily    PRN MEDICATIONS    VITALS:   T(F): 98.5  HR: 94  BP: 142/72  RR: 18  SpO2: 98%  Vital Signs Last 24 Hrs  T(C): 36.8 (29 Dec 2023 16:00), Max: 37.4 (28 Dec 2023 20:07)  T(F): 98.2 (29 Dec 2023 16:00), Max: 99.4 (28 Dec 2023 20:07)  HR: 96 (29 Dec 2023 16:00) (83 - 96)  BP: 145/70 (29 Dec 2023 16:00) (142/72 - 170/80)  BP(mean): --  RR: 18 (29 Dec 2023 16:00) (18 - 20)  SpO2: 98% (29 Dec 2023 16:00) (98% - 100%)    Parameters below as of 29 Dec 2023 16:00  Patient On (Oxygen Delivery Method): nasal cannula      PHYSICAL EXAM:  GENERAL: NAD, lying in bed- 4l/m O2 by NC; AAOx4  HEAD:  Atraumatic, Normocephalic  EYES: EOMI, PERRLA, conjunctiva and sclera clear  ENMT: No tonsillar erythema, exudates, or enlargement; 3/4 airway  NECK: Supple, No JVD, Normal thyroid  NERVOUS SYSTEM:  Alert & Oriented X3, Good concentration;   CHEST/LUNG: vbs bilateral ronchi+ and mild exp wheezing;  HEART: scar; RRR; No murmurs, rubs, or gallops  ABDOMEN: Soft, Nontender, Nondistended; Bowel sounds present  EXTREMITIES:  , No clubbing, cyanosis, or edema  LYMPH: No lymphadenopathy noted  SKIN: No rashes or lesions    LABS:                        12.7   8.68  )-----------( 262      ( 29 Dec 2023 06:06 )             39.9     12-29    143  |  102  |  37<H>  ----------------------------<  120<H>  5.0   |  31  |  1.2    Ca    8.7      29 Dec 2023 06:06  Mg     2.5     12-29    TPro  6.5  /  Alb  3.3<L>  /  TBili  <0.2  /  DBili  x   /  AST  20  /  ALT  23  /  AlkPhos  124<H>  12-29      Urinalysis Basic - ( 29 Dec 2023 06:06 )    Color: x / Appearance: x / SG: x / pH: x  Gluc: 120 mg/dL / Ketone: x  / Bili: x / Urobili: x   Blood: x / Protein: x / Nitrite: x   Leuk Esterase: x / RBC: x / WBC x   Sq Epi: x / Non Sq Epi: x / Bacteria: x      ABG - ( 28 Dec 2023 03:46 )  pH, Arterial: 7.40  pH, Blood: x     /  pCO2: 55    /  pO2: 55    / HCO3: 34    / Base Excess: 7.5   /  SaO2: 84.1                      RADIOLOGY:  I REV'D OP ECHO FROM 10/23:  LVF-OK; EF 55-60%;  LA ENLARGEMENT; +BIOPROSTHETIC AV           98.2 Chart reviewed, patient examined. Pertinent results reviewed.  Case discussed with HO; specialist f/u reviewed  HD#3; much improved since admission.    Patient is a 68y old Male who presents with a chief complaint of SOB (28 Dec 2023 09:00)    Primary diagnosis of COPD with respiratory distress, acute; Encephalopathy 2/2 hypoxia.  All due to URi/ COPD exacerbation.    HPI:  61 yo man active smoke, known COPD on BiPAP at night, hx of Rrt DVT, factor V Leiden,  HTN, DL, and hx of AVR (Bioprosthetic , 2017) presenting for confusion and slurred speech which was noticed by his family.  He admits to feeling worsening shortness of breath with increasing cough and sputum associated with worsening lower ext swelling, orthopnea and PND over past 3-4 days. Patient tis not compliant with BiPAP, lasix  or inhalers. Denies purulent sputum, sick contacts, fever, chills, chest pain/    In ED he was found to be hypoxemic and started on O2 then switched to BiPAP after he developed Respiratory acidosis.Work up showed pulmonary congestion, elevated Pro-BNP  CTA showed no PE.  He was given lasix and admitted for further management       This morning patient was seen and examined at bedside, resting comfortably in bed.    No acute or major events overnight. He used BIPAP overnight. Slept well.         REVIEW OF SYSTEMS:  CONSTITUTIONAL: No fever, weight loss, or fatigue; poor sleep- had recent nightmares;  EYES: No eye pain, visual disturbances, or discharge  ENMT:  No difficulty hearing, tinnitus, vertigo; No sinus or throat pain  NECK: No pain or stiffness  RESPIRATORY: + cough,+ wheezing,+ chills   shortness of breath+  CARDIOVASCULAR: No chest pain, palpitations, dizziness, + leg swelling- better w diuretic and Rx for DVT  GASTROINTESTINAL: No abdominal or epigastric pain. No nausea, vomiting, or hematemesis; No diarrhea or constipation. No melena or hematochezia.  GENITOURINARY: No dysuria, frequency, hematuria, or incontinence; + nocturia  MUSCULOSKELETAL: No joint pain or swelling; No muscle, back, or extremity pain  PSYCHIATRIC: No depression, anxiety, mood swings, or difficulty sleeping  HEME/LYMPH: No easy bruising, or bleeding gums  ALLERY AND IMMUNOLOGIC: No hives or eczema  NEURO: dysartria; felt foggy; ? hallucinations; says + nightmares; no HA        Code Status: Full    PAST MEDICAL & SURGICAL HISTORY  HTN (hypertension)    Dyslipidemia    Factor V Leiden    DVT, lower extremity-unprovoked 4 mos ago- on AC    COPD, moderate    S/P AVR (aortic valve replacement)      SOCIAL HISTORY:  Social History: Still smokes 1/2 PPD  Retired [employer]-9/11 exposure and F/U      ALLERGIES:  No Known Allergies    MEDICATIONS:  STANDING MEDICATIONS  albuterol/ipratropium for Nebulization 3 milliLiter(s) Nebulizer every 6 hours  atorvastatin 80 milliGRAM(s) Oral at bedtime  azithromycin  IVPB 500 milliGRAM(s) IV Intermittent every 24 hours  budesonide 160 MICROgram(s)/formoterol 4.5 MICROgram(s) Inhaler 2 Puff(s) Inhalation two times a day  furosemide   Injectable 40 milliGRAM(s) IV Push daily  methylPREDNISolone sodium succinate Injectable 60 milliGRAM(s) IV Push every 12 hours  metoprolol succinate  milliGRAM(s) Oral daily  NIFEdipine XL 60 milliGRAM(s) Oral daily  pantoprazole    Tablet 40 milliGRAM(s) Oral before breakfast  rivaroxaban 20 milliGRAM(s) Oral with dinner  tiotropium 2.5 MICROgram(s) Inhaler 2 Puff(s) Inhalation daily  valsartan 320 milliGRAM(s) Oral daily    PRN MEDICATIONS    VITALS:   T(F): 98.5  HR: 94  BP: 142/72  RR: 18  SpO2: 98%  Vital Signs Last 24 Hrs  T(C): 36.8 (29 Dec 2023 16:00), Max: 37.4 (28 Dec 2023 20:07)  T(F): 98.2 (29 Dec 2023 16:00), Max: 99.4 (28 Dec 2023 20:07)  HR: 96 (29 Dec 2023 16:00) (83 - 96)  BP: 145/70 (29 Dec 2023 16:00) (142/72 - 170/80)  BP(mean): --  RR: 18 (29 Dec 2023 16:00) (18 - 20)  SpO2: 98% (29 Dec 2023 16:00) (98% - 100%)    Parameters below as of 29 Dec 2023 16:00  Patient On (Oxygen Delivery Method): nasal cannula      PHYSICAL EXAM:  GENERAL: NAD, lying in bed- 4l/m O2 by NC; AAOx4  HEAD:  Atraumatic, Normocephalic  EYES: EOMI, PERRLA, conjunctiva and sclera clear  ENMT: No tonsillar erythema, exudates, or enlargement; 3/4 airway  NECK: Supple, No JVD, Normal thyroid  NERVOUS SYSTEM:  Alert & Oriented X3, Good concentration;   CHEST/LUNG: vbs bilateral ronchi+ and mild exp wheezing;  HEART: scar; RRR; No murmurs, rubs, or gallops  ABDOMEN: Soft, Nontender, Nondistended; Bowel sounds present  EXTREMITIES:  , No clubbing, cyanosis, or edema  LYMPH: No lymphadenopathy noted  SKIN: No rashes or lesions    LABS:                        12.7   8.68  )-----------( 262      ( 29 Dec 2023 06:06 )             39.9     12-29    143  |  102  |  37<H>  ----------------------------<  120<H>  5.0   |  31  |  1.2    Ca    8.7      29 Dec 2023 06:06  Mg     2.5     12-29    TPro  6.5  /  Alb  3.3<L>  /  TBili  <0.2  /  DBili  x   /  AST  20  /  ALT  23  /  AlkPhos  124<H>  12-29      Urinalysis Basic - ( 29 Dec 2023 06:06 )    Color: x / Appearance: x / SG: x / pH: x  Gluc: 120 mg/dL / Ketone: x  / Bili: x / Urobili: x   Blood: x / Protein: x / Nitrite: x   Leuk Esterase: x / RBC: x / WBC x   Sq Epi: x / Non Sq Epi: x / Bacteria: x      ABG - ( 28 Dec 2023 03:46 )  pH, Arterial: 7.40  pH, Blood: x     /  pCO2: 55    /  pO2: 55    / HCO3: 34    / Base Excess: 7.5   /  SaO2: 84.1                      RADIOLOGY:  I REV'D OP ECHO FROM 10/23:  LVF-OK; EF 55-60%;  LA ENLARGEMENT; +BIOPROSTHETIC AV

## 2023-12-29 NOTE — PROGRESS NOTE ADULT - ASSESSMENT
61 yo man active smoke, known COPD, hx of Rrt DVT, factor V Leiden,  HTN, DL, and hx of AVR (Bioprosthetic , 2017) presenting Hypoxic, hyupercapnic respiratory failure 2/2 AECOPD and Decompensated HFpEF      #AMS on admission  -resolved  -CTH  -MRI   -Fu neuro    #AHRF  #Acute respiratory acidosis  #AECOPD  #AMS/CO2 Narcosis - resolved at time of evaluation  - C/w BD  - solumedrol IV 125mg once then 60bid followed by taper  - azithromycin 500mg qd  - O2 to target SpO2 89-92%  - BiPAP   -Fu pulm      #Decompensated HF  #AVR  #HTN  - Pro-BNP elevated  - ECG and trops unremarkable  - c/w lasix 40mg daily  - c/w metoprolol  - c/w valsartan and nifedipine  - hold hctz  - TTE  - TSH  - strick Is and Os   - O2 and BIPAP as needed    #hyperkalemia on labs repeat  hx Rt DVT/factor v leiden on rivaroxaban           59 yo man active smoke, known COPD, hx of Rrt DVT, factor V Leiden,  HTN, DL, and hx of AVR (Bioprosthetic , 2017) presenting Hypoxic, hyupercapnic respiratory failure 2/2 AECOPD and Decompensated HFpEF    #AMS on admission  -resolved  -CTH and MRI neg  -likely 2/2 to CO2 retention    #COPD exacerbation  -Pulm consulted  - Start on symbicort 160mcg 2 puffs BID and spiriva 2.5 mcg 2 puffs qd, Trelegy on d/c   -s/p solumedrol IV 60mg qd, taper to prednisone PO 40mg qd for 5 days decrease to 20mg for another 5 days for a total of 10 days  - follow-up with pulmonary after discharge for an incidental pulmonary nodule, as well as ongoing LDCT screening.  - Dc ABx  - O2 to target SpO2 89-92%  - BiPAP   -Fu RVP    #HFpEF  #AVR  #HTN  - Pro-BNP 4561  - ECG and trops unremarkable  - c/w lasix 40mg daily  - c/w metoprolol  - c/w valsartan and nifedipine    #hx Rt DVT/factor v leiden on rivaroxaban           61 yo man active smoke, known COPD, hx of Rrt DVT, factor V Leiden,  HTN, DL, and hx of AVR (Bioprosthetic , 2017) presenting Hypoxic, hyupercapnic respiratory failure 2/2 AECOPD and Decompensated HFpEF    #AMS on admission  -resolved  -CTH and MRI neg  -likely 2/2 to CO2 retention    #COPD exacerbation  -Pulm consulted  - Start on symbicort 160mcg 2 puffs BID and spiriva 2.5 mcg 2 puffs qd, Trelegy on d/c   -s/p solumedrol IV 60mg qd, taper to prednisone PO 40mg qd for 5 days decrease to 20mg for another 5 days for a total of 10 days  - follow-up with pulmonary after discharge for an incidental pulmonary nodule, as well as ongoing LDCT screening.  - Dc ABx  - O2 to target SpO2 89-92%  - BiPAP   -Fu RVP    #HFpEF  #AVR  #HTN  - Pro-BNP 4561  - ECG and trops unremarkable  - c/w lasix 40mg daily  - c/w metoprolol  - c/w valsartan and nifedipine    #hx Rt DVT/factor v leiden on rivaroxaban           59 yo man active smoke, known COPD, hx of Rrt DVT, factor V Leiden,  HTN, DL, and hx of AVR (Bioprosthetic , 2017) presenting Hypoxic, hyupercapnic respiratory failure 2/2 AECOPD and Decompensated HFpEF    #AMS on admission  -resolved  -CTH and MRI neg  -likely 2/2 to CO2 retention    #COPD exacerbation  -Pulm consulted  - Start on symbicort 160mcg 2 puffs BID and spiriva 2.5 mcg 2 puffs qd, Trelegy on d/c   -s/p solumedrol IV 60mg qd, taper to prednisone PO 40mg qd for 5 days decrease to 20mg for another 5 days for a total of 10 days  - follow-up with pulmonary after discharge for an incidental pulmonary nodule, as well as ongoing LDCT screening.  - Dc ABx  - O2 to target SpO2 89-92%  - BiPAP   -Fu RVP    #HFpEF  #AVR  #HTN  - Pro-BNP 4561  - ECG and trops unremarkable  - c/w lasix 40mg daily  - c/w metoprolol  - c/w valsartan - Hctz and nifedipine    #hx Rt DVT/factor v leiden on rivaroxaban           61 yo man active smoke, known COPD, hx of Rrt DVT, factor V Leiden,  HTN, DL, and hx of AVR (Bioprosthetic , 2017) presenting Hypoxic, hyupercapnic respiratory failure 2/2 AECOPD and Decompensated HFpEF    #AMS on admission  -resolved  -CTH and MRI neg  -likely 2/2 to CO2 retention    #COPD exacerbation  -Pulm consulted  - Start on symbicort 160mcg 2 puffs BID and spiriva 2.5 mcg 2 puffs qd, Trelegy on d/c   -s/p solumedrol IV 60mg qd, taper to prednisone PO 40mg qd for 5 days decrease to 20mg for another 5 days for a total of 10 days  - follow-up with pulmonary after discharge for an incidental pulmonary nodule, as well as ongoing LDCT screening.  - Dc ABx  - O2 to target SpO2 89-92%  - BiPAP   -Fu RVP    #HFpEF  #AVR  #HTN  - Pro-BNP 4561  - ECG and trops unremarkable  - c/w lasix 40mg daily  - c/w metoprolol  - c/w valsartan - Hctz and nifedipine    #hx Rt DVT/factor v leiden on rivaroxaban

## 2023-12-29 NOTE — CONSULT NOTE ADULT - SUBJECTIVE AND OBJECTIVE BOX
Patient is a 68y old  Male who presents with a chief complaint of SOB and dysarthria (29 Dec 2023 16:20)      HPI:  59 yo man active smoke, known COPD on BiPAP at night, hx of Rrt DVT, factor V Leiden,  HTN, DL, and hx of AVR (Bioprosthetic , 2017) presenting for confusion and slurred speech which was noticed by his family.  He admits to feeling worsening shortness of breath with increasing cough and sputum associated with worsening lower ext swelling, orthopnea and PND over past 3-4 days. Patient tis not compliant with BiPAP, lasix  or inhalers. Denies purulent sputum, sick contacts, fever, chills, chest pain/    In ED he was found to be hypoxemic and started on O2 then switched to BiPAP after he developed Respiratory acidosis.Work up showed pulmonary congestion, elevated Pro-BNP  CTA showed no PE.  He was given lasix and admitted for further management     (28 Dec 2023 03:48)      PAST MEDICAL & SURGICAL HISTORY:  HTN (hypertension)      Dyslipidemia      Factor V Leiden      DVT, lower extremity      COPD, moderate      S/P AVR (aortic valve replacement)                          PREVIOUS DIAGNOSTIC TESTING:      ECHO  FINDINGS:    STRESS  FINDINGS:    CATHETERIZATION  FINDINGS:    MEDICATIONS  (STANDING):  albuterol/ipratropium for Nebulization 3 milliLiter(s) Nebulizer every 4 hours  atorvastatin 80 milliGRAM(s) Oral at bedtime  azithromycin  IVPB 500 milliGRAM(s) IV Intermittent every 24 hours  budesonide 160 MICROgram(s)/formoterol 4.5 MICROgram(s) Inhaler 2 Puff(s) Inhalation two times a day  budesonide 160 MICROgram(s)/formoterol 4.5 MICROgram(s) Inhaler 2 Puff(s) Inhalation two times a day  guaiFENesin  milliGRAM(s) Oral every 12 hours  hydrochlorothiazide 12.5 milliGRAM(s) Oral daily  metoprolol succinate  milliGRAM(s) Oral daily  nicotine -  14 mG/24Hr(s) Patch 1 Patch Transdermal daily  NIFEdipine XL 60 milliGRAM(s) Oral daily  pantoprazole    Tablet 40 milliGRAM(s) Oral before breakfast  predniSONE   Tablet 40 milliGRAM(s) Oral daily  rivaroxaban 20 milliGRAM(s) Oral with dinner  tiotropium 2.5 MICROgram(s) Inhaler 2 Puff(s) Inhalation daily  tiotropium 2.5 MICROgram(s) Inhaler 2 Puff(s) Inhalation daily  valsartan 320 milliGRAM(s) Oral daily    MEDICATIONS  (PRN):      FAMILY HISTORY:      SOCIAL HISTORY:    CIGARETTES:    ALCOHOL:        Vital Signs Last 24 Hrs  T(C): 36.8 (29 Dec 2023 16:00), Max: 37.1 (29 Dec 2023 06:20)  T(F): 98.2 (29 Dec 2023 16:00), Max: 98.8 (29 Dec 2023 06:20)  HR: 96 (29 Dec 2023 16:00) (86 - 96)  BP: 145/70 (29 Dec 2023 16:00) (142/72 - 170/80)  BP(mean): --  RR: 18 (29 Dec 2023 16:00) (18 - 18)  SpO2: 98% (29 Dec 2023 16:00) (98% - 100%)    Parameters below as of 29 Dec 2023 16:00  Patient On (Oxygen Delivery Method): nasal cannula                INTERPRETATION OF TELEMETRY:    ECG:    I&O's Detail    28 Dec 2023 07:01  -  29 Dec 2023 07:00  --------------------------------------------------------  IN:    IV PiggyBack: 250 mL  Total IN: 250 mL    OUT:    Voided (mL): 150 mL  Total OUT: 150 mL    Total NET: 100 mL          LABS:                        12.7   8.68  )-----------( 262      ( 29 Dec 2023 06:06 )             39.9     12-29    143  |  102  |  37<H>  ----------------------------<  120<H>  5.0   |  31  |  1.2    Ca    8.7      29 Dec 2023 06:06  Mg     2.5     12-29    TPro  6.5  /  Alb  3.3<L>  /  TBili  <0.2  /  DBili  x   /  AST  20  /  ALT  23  /  AlkPhos  124<H>  12-29          Urinalysis Basic - ( 29 Dec 2023 06:06 )    Color: x / Appearance: x / SG: x / pH: x  Gluc: 120 mg/dL / Ketone: x  / Bili: x / Urobili: x   Blood: x / Protein: x / Nitrite: x   Leuk Esterase: x / RBC: x / WBC x   Sq Epi: x / Non Sq Epi: x / Bacteria: x      I&O's Summary    28 Dec 2023 07:01  -  29 Dec 2023 07:00  --------------------------------------------------------  IN: 250 mL / OUT: 150 mL / NET: 100 mL      BNP  RADIOLOGY & ADDITIONAL STUDIES: Patient is a 68y old  Male who presents with a chief complaint of SOB and dysarthria (29 Dec 2023 16:20)      HPI:  61 yo man active smoke, known COPD on BiPAP at night, hx of Rrt DVT, factor V Leiden,  HTN, DL, and hx of AVR (Bioprosthetic , 2017) presenting for confusion and slurred speech which was noticed by his family.  He admits to feeling worsening shortness of breath with increasing cough and sputum associated with worsening lower ext swelling, orthopnea and PND over past 3-4 days. Patient tis not compliant with BiPAP, lasix  or inhalers. Denies purulent sputum, sick contacts, fever, chills, chest pain/    In ED he was found to be hypoxemic and started on O2 then switched to BiPAP after he developed Respiratory acidosis.Work up showed pulmonary congestion, elevated Pro-BNP  CTA showed no PE.  He was given lasix and admitted for further management     (28 Dec 2023 03:48)      PAST MEDICAL & SURGICAL HISTORY:  HTN (hypertension)      Dyslipidemia      Factor V Leiden      DVT, lower extremity      COPD, moderate      S/P AVR (aortic valve replacement)                          PREVIOUS DIAGNOSTIC TESTING:      ECHO  FINDINGS:    STRESS  FINDINGS:    CATHETERIZATION  FINDINGS:    MEDICATIONS  (STANDING):  albuterol/ipratropium for Nebulization 3 milliLiter(s) Nebulizer every 4 hours  atorvastatin 80 milliGRAM(s) Oral at bedtime  azithromycin  IVPB 500 milliGRAM(s) IV Intermittent every 24 hours  budesonide 160 MICROgram(s)/formoterol 4.5 MICROgram(s) Inhaler 2 Puff(s) Inhalation two times a day  budesonide 160 MICROgram(s)/formoterol 4.5 MICROgram(s) Inhaler 2 Puff(s) Inhalation two times a day  guaiFENesin  milliGRAM(s) Oral every 12 hours  hydrochlorothiazide 12.5 milliGRAM(s) Oral daily  metoprolol succinate  milliGRAM(s) Oral daily  nicotine -  14 mG/24Hr(s) Patch 1 Patch Transdermal daily  NIFEdipine XL 60 milliGRAM(s) Oral daily  pantoprazole    Tablet 40 milliGRAM(s) Oral before breakfast  predniSONE   Tablet 40 milliGRAM(s) Oral daily  rivaroxaban 20 milliGRAM(s) Oral with dinner  tiotropium 2.5 MICROgram(s) Inhaler 2 Puff(s) Inhalation daily  tiotropium 2.5 MICROgram(s) Inhaler 2 Puff(s) Inhalation daily  valsartan 320 milliGRAM(s) Oral daily    MEDICATIONS  (PRN):      FAMILY HISTORY:      SOCIAL HISTORY:    CIGARETTES:    ALCOHOL:        Vital Signs Last 24 Hrs  T(C): 36.8 (29 Dec 2023 16:00), Max: 37.1 (29 Dec 2023 06:20)  T(F): 98.2 (29 Dec 2023 16:00), Max: 98.8 (29 Dec 2023 06:20)  HR: 96 (29 Dec 2023 16:00) (86 - 96)  BP: 145/70 (29 Dec 2023 16:00) (142/72 - 170/80)  BP(mean): --  RR: 18 (29 Dec 2023 16:00) (18 - 18)  SpO2: 98% (29 Dec 2023 16:00) (98% - 100%)    Parameters below as of 29 Dec 2023 16:00  Patient On (Oxygen Delivery Method): nasal cannula                INTERPRETATION OF TELEMETRY:    ECG:    I&O's Detail    28 Dec 2023 07:01  -  29 Dec 2023 07:00  --------------------------------------------------------  IN:    IV PiggyBack: 250 mL  Total IN: 250 mL    OUT:    Voided (mL): 150 mL  Total OUT: 150 mL    Total NET: 100 mL          LABS:                        12.7   8.68  )-----------( 262      ( 29 Dec 2023 06:06 )             39.9     12-29    143  |  102  |  37<H>  ----------------------------<  120<H>  5.0   |  31  |  1.2    Ca    8.7      29 Dec 2023 06:06  Mg     2.5     12-29    TPro  6.5  /  Alb  3.3<L>  /  TBili  <0.2  /  DBili  x   /  AST  20  /  ALT  23  /  AlkPhos  124<H>  12-29          Urinalysis Basic - ( 29 Dec 2023 06:06 )    Color: x / Appearance: x / SG: x / pH: x  Gluc: 120 mg/dL / Ketone: x  / Bili: x / Urobili: x   Blood: x / Protein: x / Nitrite: x   Leuk Esterase: x / RBC: x / WBC x   Sq Epi: x / Non Sq Epi: x / Bacteria: x      I&O's Summary    28 Dec 2023 07:01  -  29 Dec 2023 07:00  --------------------------------------------------------  IN: 250 mL / OUT: 150 mL / NET: 100 mL      BNP  RADIOLOGY & ADDITIONAL STUDIES:

## 2023-12-29 NOTE — PROGRESS NOTE ADULT - ASSESSMENT
61 yo man active smoke, known COPD, hx of Rrt DVT, factor V Leiden,  HTN, DL, and hx of AVR (Bioprosthetic , 2017) presenting Hypoxic, hypercapnic respiratory failure 2/2 AECOPD and Decompensated HFpEF      #AMS on admission  -resolved  -CTH- noted  -MRI HEAD-NC  - neuro consult;  - get carotid duplex please    #AHRF  #Acute on chronic respiratory acidosis  #AECOPD  #AMS/CO2 Narcosis - resolved at time of evaluation  - C/w BD  - solumedrol IV 125mg once then 60bid followed by taper;   - azithromycin 500mg qd  - O2 to target SpO2 89-92%  - BiPAP   -See pulm recommendations- please follow ; taper steroids.      #Decompensated HF  #AVR  #HTN  - Pro-BNP elevated  - ECG and trops unremarkable  - c/w lasix 40mg daily  - c/w metoprolol  - c/w valsartan and nifedipine  - hold hctz  - TTE  - TSH  - strict Is and Os   - O2 and BIPAP as needed  - cardio suggests farxiga-OK    Smoking still  - strongly advised to DC; contributing factor to his multiple problems.  - Nicotine patch now    ANIKET  - Dx years ago  - non-compliant w machine at home    #hyperkalemia on labs repeat  hx Rt DVT/factor v leiden on rivaroxaban  -ambulate as able in room           59 yo man active smoke, known COPD, hx of Rrt DVT, factor V Leiden,  HTN, DL, and hx of AVR (Bioprosthetic , 2017) presenting Hypoxic, hypercapnic respiratory failure 2/2 AECOPD and Decompensated HFpEF      #AMS on admission  -resolved  -CTH- noted  -MRI HEAD-NC  - neuro consult;  - get carotid duplex please    #AHRF  #Acute on chronic respiratory acidosis  #AECOPD  #AMS/CO2 Narcosis - resolved at time of evaluation  - C/w BD  - solumedrol IV 125mg once then 60bid followed by taper;   - azithromycin 500mg qd  - O2 to target SpO2 89-92%  - BiPAP   -See pulm recommendations- please follow ; taper steroids.      #Decompensated HF  #AVR  #HTN  - Pro-BNP elevated  - ECG and trops unremarkable  - c/w lasix 40mg daily  - c/w metoprolol  - c/w valsartan and nifedipine  - hold hctz  - TTE  - TSH  - strict Is and Os   - O2 and BIPAP as needed  - cardio suggests farxiga-OK    Smoking still  - strongly advised to DC; contributing factor to his multiple problems.  - Nicotine patch now    ANIKET  - Dx years ago  - non-compliant w machine at home    #hyperkalemia on labs repeat  hx Rt DVT/factor v leiden on rivaroxaban  -ambulate as able in room

## 2023-12-29 NOTE — PROVIDER CONTACT NOTE (OTHER) - SITUATION
Report given after patient was on unit from ED. In notes patient arrived to ED with slurred speech/confusion , no stroke workup was done. Patient currently ax04, no slurred speech, no facial droop.

## 2023-12-29 NOTE — PROVIDER CONTACT NOTE (OTHER) - RECOMMENDATIONS
Informed Diamond Bhatia MD asked if a CT can be ordered of patients had since stroke was not ruled out on patients arrival and patient has factor v leden. Asked Diamond Bhatia MD to notify his senior as wel

## 2023-12-29 NOTE — CONSULT NOTE ADULT - ASSESSMENT
Impression    COPD exacerbation, not on home o2  BL nodular opacities  Jj?   hx of Rrt DVT,   factor V Leiden,  hx of AVR (Bioprosthetic , 2017)     Plan     Impression    Acute hypoxemic/hypercapnic respiratory failure  COPD exacerbation, not on home o2  Fluid overload  BL nodular opacities  ANIKET/OHS?   hx of Rrt DVT   factor V Leiden,  hx of AVR (Bioprosthetic , 2017)     Plan    Chest imagine reviewed, negative for PE, BL nodular opacities noted, follows with a pulmonologist o/p.   RVP negative  Echo pending read, BNP elevated  Start on symbicort 160mcg 2 puffs BID and spiriva 2.5 mcg 2 puffs qd, Trelegy on d/c   Solumedrol IV 60mg qd, taper to prednisone PO 40mg qd for 5 days decrease to 20mg for another 5 days for a total of 10 days  Albuterol nebulizer q4hrs and PRN  BiPAP PRN and QHS, 12/6, RR 12  Diuresis as tolerated, maintain negative fluid balance  Wean o2 to goal 88-92%  Outpatient follow up regarding nodular opacities\  Sleep study/PFTs per outpatient pulmonologist  Hold abx, get procal

## 2023-12-29 NOTE — PROGRESS NOTE ADULT - SUBJECTIVE AND OBJECTIVE BOX
24H events:    Patient is a 68y old Male who presents with a chief complaint of SOB (28 Dec 2023 09:00)    Primary diagnosis of COPD with respiratory distress, acute      Day 1:  Day 2:  Day 3:     Today is hospital day 1d. This morning patient was seen and examined at bedside, resting comfortably in bed.    No acute or major events overnight.    Code Status:    Family communication:  Contact date:  Name of person contacted:  Relationship to patient:  Communication details:  What matters most:    PAST MEDICAL & SURGICAL HISTORY  HTN (hypertension)    Dyslipidemia    Factor V Leiden    DVT, lower extremity    COPD, moderate    S/P AVR (aortic valve replacement)      SOCIAL HISTORY:  Social History:      ALLERGIES:  No Known Allergies    MEDICATIONS:  STANDING MEDICATIONS  albuterol/ipratropium for Nebulization 3 milliLiter(s) Nebulizer every 6 hours  atorvastatin 80 milliGRAM(s) Oral at bedtime  azithromycin  IVPB 500 milliGRAM(s) IV Intermittent every 24 hours  budesonide 160 MICROgram(s)/formoterol 4.5 MICROgram(s) Inhaler 2 Puff(s) Inhalation two times a day  furosemide   Injectable 40 milliGRAM(s) IV Push daily  methylPREDNISolone sodium succinate Injectable 60 milliGRAM(s) IV Push every 12 hours  metoprolol succinate  milliGRAM(s) Oral daily  NIFEdipine XL 60 milliGRAM(s) Oral daily  pantoprazole    Tablet 40 milliGRAM(s) Oral before breakfast  rivaroxaban 20 milliGRAM(s) Oral with dinner  tiotropium 2.5 MICROgram(s) Inhaler 2 Puff(s) Inhalation daily  valsartan 320 milliGRAM(s) Oral daily    PRN MEDICATIONS    VITALS:   T(F): 98.5  HR: 94  BP: 142/72  RR: 18  SpO2: 98%    PHYSICAL EXAM:  GENERAL: NAD,   HEAD:  Atraumatic, Normocephalic  EYES: EOMI, PERRLA, conjunctiva and sclera clear  ENMT: No tonsillar erythema, exudates, or enlargement;   NECK: Supple, No JVD, Normal thyroid  NERVOUS SYSTEM:  Alert & Oriented X3, Good concentration;   CHEST/LUNG: vbs bilateral ronchi+  HEART: Regular rate and rhythm; No murmurs, rubs, or gallops  ABDOMEN: Soft, Nontender, Nondistended; Bowel sounds present  EXTREMITIES:  , No clubbing, cyanosis, or edema  LYMPH: No lymphadenopathy noted  SKIN: No rashes or lesions    LABS:                        12.7   8.68  )-----------( 262      ( 29 Dec 2023 06:06 )             39.9     12-29    143  |  102  |  37<H>  ----------------------------<  120<H>  5.0   |  31  |  1.2    Ca    8.7      29 Dec 2023 06:06  Mg     2.5     12-29    TPro  6.5  /  Alb  3.3<L>  /  TBili  <0.2  /  DBili  x   /  AST  20  /  ALT  23  /  AlkPhos  124<H>  12-29      Urinalysis Basic - ( 29 Dec 2023 06:06 )    Color: x / Appearance: x / SG: x / pH: x  Gluc: 120 mg/dL / Ketone: x  / Bili: x / Urobili: x   Blood: x / Protein: x / Nitrite: x   Leuk Esterase: x / RBC: x / WBC x   Sq Epi: x / Non Sq Epi: x / Bacteria: x      ABG - ( 28 Dec 2023 03:46 )  pH, Arterial: 7.40  pH, Blood: x     /  pCO2: 55    /  pO2: 55    / HCO3: 34    / Base Excess: 7.5   /  SaO2: 84.1                      RADIOLOGY:

## 2023-12-30 LAB
ALBUMIN SERPL ELPH-MCNC: 3.1 G/DL — LOW (ref 3.5–5.2)
ALBUMIN SERPL ELPH-MCNC: 3.1 G/DL — LOW (ref 3.5–5.2)
ALP SERPL-CCNC: 106 U/L — SIGNIFICANT CHANGE UP (ref 30–115)
ALP SERPL-CCNC: 106 U/L — SIGNIFICANT CHANGE UP (ref 30–115)
ALT FLD-CCNC: 22 U/L — SIGNIFICANT CHANGE UP (ref 0–41)
ALT FLD-CCNC: 22 U/L — SIGNIFICANT CHANGE UP (ref 0–41)
ANION GAP SERPL CALC-SCNC: 8 MMOL/L — SIGNIFICANT CHANGE UP (ref 7–14)
ANION GAP SERPL CALC-SCNC: 8 MMOL/L — SIGNIFICANT CHANGE UP (ref 7–14)
AST SERPL-CCNC: 21 U/L — SIGNIFICANT CHANGE UP (ref 0–41)
AST SERPL-CCNC: 21 U/L — SIGNIFICANT CHANGE UP (ref 0–41)
BASOPHILS # BLD AUTO: 0.05 K/UL — SIGNIFICANT CHANGE UP (ref 0–0.2)
BASOPHILS # BLD AUTO: 0.05 K/UL — SIGNIFICANT CHANGE UP (ref 0–0.2)
BASOPHILS NFR BLD AUTO: 0.6 % — SIGNIFICANT CHANGE UP (ref 0–1)
BASOPHILS NFR BLD AUTO: 0.6 % — SIGNIFICANT CHANGE UP (ref 0–1)
BILIRUB SERPL-MCNC: <0.2 MG/DL — SIGNIFICANT CHANGE UP (ref 0.2–1.2)
BILIRUB SERPL-MCNC: <0.2 MG/DL — SIGNIFICANT CHANGE UP (ref 0.2–1.2)
BUN SERPL-MCNC: 37 MG/DL — HIGH (ref 10–20)
BUN SERPL-MCNC: 37 MG/DL — HIGH (ref 10–20)
CALCIUM SERPL-MCNC: 8.6 MG/DL — SIGNIFICANT CHANGE UP (ref 8.4–10.5)
CALCIUM SERPL-MCNC: 8.6 MG/DL — SIGNIFICANT CHANGE UP (ref 8.4–10.5)
CHLORIDE SERPL-SCNC: 106 MMOL/L — SIGNIFICANT CHANGE UP (ref 98–110)
CHLORIDE SERPL-SCNC: 106 MMOL/L — SIGNIFICANT CHANGE UP (ref 98–110)
CO2 SERPL-SCNC: 33 MMOL/L — HIGH (ref 17–32)
CO2 SERPL-SCNC: 33 MMOL/L — HIGH (ref 17–32)
CREAT SERPL-MCNC: 1.2 MG/DL — SIGNIFICANT CHANGE UP (ref 0.7–1.5)
CREAT SERPL-MCNC: 1.2 MG/DL — SIGNIFICANT CHANGE UP (ref 0.7–1.5)
EGFR: 66 ML/MIN/1.73M2 — SIGNIFICANT CHANGE UP
EGFR: 66 ML/MIN/1.73M2 — SIGNIFICANT CHANGE UP
EOSINOPHIL # BLD AUTO: 0.06 K/UL — SIGNIFICANT CHANGE UP (ref 0–0.7)
EOSINOPHIL # BLD AUTO: 0.06 K/UL — SIGNIFICANT CHANGE UP (ref 0–0.7)
EOSINOPHIL NFR BLD AUTO: 0.7 % — SIGNIFICANT CHANGE UP (ref 0–8)
EOSINOPHIL NFR BLD AUTO: 0.7 % — SIGNIFICANT CHANGE UP (ref 0–8)
GLUCOSE SERPL-MCNC: 107 MG/DL — HIGH (ref 70–99)
GLUCOSE SERPL-MCNC: 107 MG/DL — HIGH (ref 70–99)
HCT VFR BLD CALC: 39.7 % — LOW (ref 42–52)
HCT VFR BLD CALC: 39.7 % — LOW (ref 42–52)
HGB BLD-MCNC: 12.5 G/DL — LOW (ref 14–18)
HGB BLD-MCNC: 12.5 G/DL — LOW (ref 14–18)
IMM GRANULOCYTES NFR BLD AUTO: 0.6 % — HIGH (ref 0.1–0.3)
IMM GRANULOCYTES NFR BLD AUTO: 0.6 % — HIGH (ref 0.1–0.3)
LYMPHOCYTES # BLD AUTO: 1.26 K/UL — SIGNIFICANT CHANGE UP (ref 1.2–3.4)
LYMPHOCYTES # BLD AUTO: 1.26 K/UL — SIGNIFICANT CHANGE UP (ref 1.2–3.4)
LYMPHOCYTES # BLD AUTO: 15.4 % — LOW (ref 20.5–51.1)
LYMPHOCYTES # BLD AUTO: 15.4 % — LOW (ref 20.5–51.1)
MAGNESIUM SERPL-MCNC: 2.5 MG/DL — HIGH (ref 1.8–2.4)
MAGNESIUM SERPL-MCNC: 2.5 MG/DL — HIGH (ref 1.8–2.4)
MCHC RBC-ENTMCNC: 31.5 G/DL — LOW (ref 32–37)
MCHC RBC-ENTMCNC: 31.5 G/DL — LOW (ref 32–37)
MCHC RBC-ENTMCNC: 33.4 PG — HIGH (ref 27–31)
MCHC RBC-ENTMCNC: 33.4 PG — HIGH (ref 27–31)
MCV RBC AUTO: 106.1 FL — HIGH (ref 80–94)
MCV RBC AUTO: 106.1 FL — HIGH (ref 80–94)
MONOCYTES # BLD AUTO: 0.84 K/UL — HIGH (ref 0.1–0.6)
MONOCYTES # BLD AUTO: 0.84 K/UL — HIGH (ref 0.1–0.6)
MONOCYTES NFR BLD AUTO: 10.3 % — HIGH (ref 1.7–9.3)
MONOCYTES NFR BLD AUTO: 10.3 % — HIGH (ref 1.7–9.3)
NEUTROPHILS # BLD AUTO: 5.9 K/UL — SIGNIFICANT CHANGE UP (ref 1.4–6.5)
NEUTROPHILS # BLD AUTO: 5.9 K/UL — SIGNIFICANT CHANGE UP (ref 1.4–6.5)
NEUTROPHILS NFR BLD AUTO: 72.4 % — SIGNIFICANT CHANGE UP (ref 42.2–75.2)
NEUTROPHILS NFR BLD AUTO: 72.4 % — SIGNIFICANT CHANGE UP (ref 42.2–75.2)
NRBC # BLD: 0 /100 WBCS — SIGNIFICANT CHANGE UP (ref 0–0)
NRBC # BLD: 0 /100 WBCS — SIGNIFICANT CHANGE UP (ref 0–0)
NT-PROBNP SERPL-SCNC: 1946 PG/ML — HIGH (ref 0–300)
NT-PROBNP SERPL-SCNC: 1946 PG/ML — HIGH (ref 0–300)
PLATELET # BLD AUTO: 249 K/UL — SIGNIFICANT CHANGE UP (ref 130–400)
PLATELET # BLD AUTO: 249 K/UL — SIGNIFICANT CHANGE UP (ref 130–400)
PMV BLD: 9.8 FL — SIGNIFICANT CHANGE UP (ref 7.4–10.4)
PMV BLD: 9.8 FL — SIGNIFICANT CHANGE UP (ref 7.4–10.4)
POTASSIUM SERPL-MCNC: 5.5 MMOL/L — HIGH (ref 3.5–5)
POTASSIUM SERPL-MCNC: 5.5 MMOL/L — HIGH (ref 3.5–5)
POTASSIUM SERPL-SCNC: 5.5 MMOL/L — HIGH (ref 3.5–5)
POTASSIUM SERPL-SCNC: 5.5 MMOL/L — HIGH (ref 3.5–5)
PROT SERPL-MCNC: 6.2 G/DL — SIGNIFICANT CHANGE UP (ref 6–8)
PROT SERPL-MCNC: 6.2 G/DL — SIGNIFICANT CHANGE UP (ref 6–8)
RBC # BLD: 3.74 M/UL — LOW (ref 4.7–6.1)
RBC # BLD: 3.74 M/UL — LOW (ref 4.7–6.1)
RBC # FLD: 14.3 % — SIGNIFICANT CHANGE UP (ref 11.5–14.5)
RBC # FLD: 14.3 % — SIGNIFICANT CHANGE UP (ref 11.5–14.5)
SODIUM SERPL-SCNC: 147 MMOL/L — HIGH (ref 135–146)
SODIUM SERPL-SCNC: 147 MMOL/L — HIGH (ref 135–146)
WBC # BLD: 8.16 K/UL — SIGNIFICANT CHANGE UP (ref 4.8–10.8)
WBC # BLD: 8.16 K/UL — SIGNIFICANT CHANGE UP (ref 4.8–10.8)
WBC # FLD AUTO: 8.16 K/UL — SIGNIFICANT CHANGE UP (ref 4.8–10.8)
WBC # FLD AUTO: 8.16 K/UL — SIGNIFICANT CHANGE UP (ref 4.8–10.8)

## 2023-12-30 PROCEDURE — 99222 1ST HOSP IP/OBS MODERATE 55: CPT

## 2023-12-30 RX ORDER — DAPAGLIFLOZIN 10 MG/1
10 TABLET, FILM COATED ORAL EVERY 24 HOURS
Refills: 0 | Status: DISCONTINUED | OUTPATIENT
Start: 2023-12-30 | End: 2024-01-02

## 2023-12-30 RX ADMIN — AZITHROMYCIN 255 MILLIGRAM(S): 500 TABLET, FILM COATED ORAL at 22:16

## 2023-12-30 RX ADMIN — Medication 1 PATCH: at 12:40

## 2023-12-30 RX ADMIN — RIVAROXABAN 20 MILLIGRAM(S): KIT at 17:21

## 2023-12-30 RX ADMIN — Medication 40 MILLIGRAM(S): at 05:41

## 2023-12-30 RX ADMIN — Medication 600 MILLIGRAM(S): at 05:38

## 2023-12-30 RX ADMIN — Medication 60 MILLIGRAM(S): at 05:39

## 2023-12-30 RX ADMIN — Medication 40 MILLIGRAM(S): at 05:38

## 2023-12-30 RX ADMIN — Medication 3 MILLILITER(S): at 09:42

## 2023-12-30 RX ADMIN — VALSARTAN 320 MILLIGRAM(S): 80 TABLET ORAL at 05:38

## 2023-12-30 RX ADMIN — PANTOPRAZOLE SODIUM 40 MILLIGRAM(S): 20 TABLET, DELAYED RELEASE ORAL at 05:39

## 2023-12-30 RX ADMIN — ATORVASTATIN CALCIUM 80 MILLIGRAM(S): 80 TABLET, FILM COATED ORAL at 21:07

## 2023-12-30 RX ADMIN — Medication 1 PATCH: at 12:41

## 2023-12-30 RX ADMIN — Medication 3 MILLILITER(S): at 17:17

## 2023-12-30 RX ADMIN — Medication 600 MILLIGRAM(S): at 17:21

## 2023-12-30 RX ADMIN — Medication 3 MILLILITER(S): at 20:03

## 2023-12-30 RX ADMIN — Medication 100 MILLIGRAM(S): at 05:37

## 2023-12-30 RX ADMIN — TIOTROPIUM BROMIDE 2 PUFF(S): 18 CAPSULE ORAL; RESPIRATORY (INHALATION) at 09:42

## 2023-12-30 RX ADMIN — Medication 1 PATCH: at 07:28

## 2023-12-30 NOTE — PROGRESS NOTE ADULT - SUBJECTIVE AND OBJECTIVE BOX
Chart reviewed, patient examined. Pertinent results reviewed.  Case discussed with HO; specialist f/u reviewed  HD#3; much improved since admission.    Patient is a 68y old Male who presents with a chief complaint of SOB (28 Dec 2023 09:00)    Primary diagnosis of COPD with respiratory distress, acute; Encephalopathy 2/2 hypoxia.  All due to URi/ COPD exacerbation.    HPI:  61 yo man active smoke, known COPD on BiPAP at night, hx of Rrt DVT, factor V Leiden,  HTN, DL, and hx of AVR (Bioprosthetic , 2017) presenting for confusion and slurred speech which was noticed by his family.  He admits to feeling worsening shortness of breath with increasing cough and sputum associated with worsening lower ext swelling, orthopnea and PND over past 3-4 days. Patient tis not compliant with BiPAP, lasix  or inhalers. Denies purulent sputum, sick contacts, fever, chills, chest pain/    In ED he was found to be hypoxemic and started on O2 then switched to BiPAP after he developed Respiratory acidosis.Work up showed pulmonary congestion, elevated Pro-BNP  CTA showed no PE.  He was given lasix and admitted for further management       This morning patient was seen and examined at bedside, resting comfortably in bed.   He is feeling significantly better than PTA. No nightmares, hallucinations or slurring.  No acute or major events overnight. He used BIPAP overnight. Slept well.         REVIEW OF SYSTEMS:  CONSTITUTIONAL: No fever, weight loss, or fatigue; poor sleep- had recent nightmares;  EYES: No eye pain, visual disturbances, or discharge  ENMT:  No difficulty hearing, tinnitus, vertigo; No sinus or throat pain  NECK: No pain or stiffness  RESPIRATORY: + cough,+ wheezing,+ chills   shortness of breath+  CARDIOVASCULAR: No chest pain, palpitations, dizziness, + leg swelling- better w diuretic and Rx for DVT  GASTROINTESTINAL: No abdominal or epigastric pain. No nausea, vomiting, or hematemesis; No diarrhea or constipation. No melena or hematochezia.  GENITOURINARY: No dysuria, frequency, hematuria, or incontinence; + nocturia  MUSCULOSKELETAL: No joint pain or swelling; No muscle, back, or extremity pain  PSYCHIATRIC: No depression, anxiety, mood swings, or difficulty sleeping  HEME/LYMPH: No easy bruising, or bleeding gums  ALLERY AND IMMUNOLOGIC: No hives or eczema  NEURO: dysartria; felt foggy; ? hallucinations; says + nightmares; no HA        Code Status: Full    PAST MEDICAL & SURGICAL HISTORY  HTN (hypertension)    Dyslipidemia    Factor V Leiden    DVT, lower extremity-unprovoked 4 mos ago- on AC    COPD, moderate    S/P AVR (aortic valve replacement)      SOCIAL HISTORY:  Social History: Still smokes 1/2 PPD  Retired [employer] w [employer]-9/11 exposure and F/U      ALLERGIES:  No Known Allergies    MEDICATIONS:  MEDICATIONS  (STANDING):  albuterol/ipratropium for Nebulization 3 milliLiter(s) Nebulizer every 4 hours  atorvastatin 80 milliGRAM(s) Oral at bedtime  azithromycin  IVPB 500 milliGRAM(s) IV Intermittent every 24 hours  budesonide 160 MICROgram(s)/formoterol 4.5 MICROgram(s) Inhaler 2 Puff(s) Inhalation two times a day  budesonide 160 MICROgram(s)/formoterol 4.5 MICROgram(s) Inhaler 2 Puff(s) Inhalation two times a day  dapagliflozin 10 milliGRAM(s) Oral every 24 hours-----just ordered - as per Cardio  furosemide    Tablet 40 milliGRAM(s) Oral daily  guaiFENesin  milliGRAM(s) Oral every 12 hours  hydrochlorothiazide 12.5 milliGRAM(s) Oral daily  metoprolol succinate  milliGRAM(s) Oral daily  nicotine -  14 mG/24Hr(s) Patch 1 Patch Transdermal daily  NIFEdipine XL 60 milliGRAM(s) Oral daily  pantoprazole    Tablet 40 milliGRAM(s) Oral before breakfast  predniSONE   Tablet 40 milliGRAM(s) Oral daily  rivaroxaban 20 milliGRAM(s) Oral with dinner  tiotropium 2.5 MICROgram(s) Inhaler 2 Puff(s) Inhalation daily  tiotropium 2.5 MICROgram(s) Inhaler 2 Puff(s) Inhalation daily  valsartan 320 milliGRAM(s) Oral daily    MEDICATIONS  (PRN):    VITALS:   Vital Signs Last 24 Hrs  T(C): 36.2 (30 Dec 2023 07:47), Max: 36.8 (29 Dec 2023 16:00)  T(F): 97.1 (30 Dec 2023 07:47), Max: 98.2 (29 Dec 2023 16:00)  HR: 79 (30 Dec 2023 07:47) (77 - 96)  BP: 148/70 (30 Dec 2023 07:47) (128/58 - 148/70)  BP(mean): --  RR: 18 (30 Dec 2023 05:36) (18 - 18)  SpO2: 97% (30 Dec 2023 07:47) (97% - 98%)    Parameters below as of 30 Dec 2023 07:47  Patient On (Oxygen Delivery Method): nasal cannula   97% on 4l/m; 93% on 3//m  walked w/o O2- POx to 85%- w patient not c/o SOB        PHYSICAL EXAM:  GENERAL: NAD, lying in bed- 4l/m O2 by NC; AAOx4; much coughing after walking- grey sputum.  HEAD:  Atraumatic, Normocephalic  EYES: EOMI, PERRLA, conjunctiva and sclera clear  ENMT: No tonsillar erythema, exudates, or enlargement; 3/4 airway  NECK: Supple, No JVD, Normal thyroid  NERVOUS SYSTEM:  Alert & Oriented X3, Good concentration;   CHEST/LUNG: DECreased BS; occ exp wheeze R>L  HEART: scar; RRR; No murmurs, rubs, or gallops  ABDOMEN: Soft, Nontender, Nondistended; Bowel sounds present  EXTREMITIES:  , No clubbing, cyanosis, or edema  LYMPH: No lymphadenopathy noted  SKIN: No rashes or lesions    LABS:                                12.5   8.16  )-----------( 249      ( 30 Dec 2023 06:40 )             39.7                 12.7   8.68  )-----------( 262      ( 29 Dec 2023 06:06 )             39.9     12-30    147<H>  |  106  |  37<H>  ----------------------------<  107<H>  5.5<H>   |  33<H>  |  1.2    Ca    8.6      30 Dec 2023 06:40  Mg     2.5     12-30    TPro  6.2  /  Alb  3.1<L>  /  TBili  <0.2  /  DBili  x   /  AST  21  /  ALT  22  /  AlkPhos  106  12-30    12-29    143  |  102  |  37<H>  ----------------------------<  120<H>  5.0   |  31  |  1.2    Ca    8.7      29 Dec 2023 06:06  Mg     2.5     12-29    TPro  6.5  /  Alb  3.3<L>  /  TBili  <0.2  /  DBili  x   /  AST  20  /  ALT  23  /  AlkPhos  124<H>  12-29      Urinalysis Basic - ( 29 Dec 2023 06:06 )    Color: x / Appearance: x / SG: x / pH: x  Gluc: 120 mg/dL / Ketone: x  / Bili: x / Urobili: x   Blood: x / Protein: x / Nitrite: x   Leuk Esterase: x / RBC: x / WBC x   Sq Epi: x / Non Sq Epi: x / Bacteria: x      ABG - ( 28 Dec 2023 03:46 )  pH, Arterial: 7.40  pH, Blood: x     /  pCO2: 55    /  pO2: 55    / HCO3: 34    / Base Excess: 7.5   /  SaO2: 84.1                      RADIOLOGY:   as noted below  I REV'D OP ECHO FROM 10/23:  LVF-OK; EF 55-60%;  LA ENLARGEMENT; +BIOPROSTHETIC AV    < from: MR Head No Cont (12.29.23 @ 15:04) >  ACC: [id number] EXAM:  MR BRAIN   ORDERED BY: TARYN SIERRA     PROCEDURE DATE:  12/29/2023          INTERPRETATION:  CLINICAL INDICATION: Altered mental status.    TECHNIQUE: Multi-planar multi-sequential MR imaging of the brain was   performed without intravenous contrast.    COMPARISON: Correlated with the same day CT head .    FINDINGS:    There is prominence of the sulci, sylvian fissures, and ventricles likely   reflecting mild diffuse parenchymal volume loss.    There are scattered patchyT2/FLAIR hyperintensities in bilateral   periventricular cerebral white matter, consistent with mild chronic   microvascular ischemic changes.    There are small chronic lacunar infarct in the left corona radiata and   left cerebellum.    No acute infarction, intracranial hemorrhage or mass.    There is no evidence of hydrocephalus. There are no extra-axial fluid   collections. The skull base flow voids are present.    The visualized intraorbital contents are normal. Mild mucosal thickening   in the left sphenoid sinus. The mastoid air cells are clear. The   visualized soft tissues and osseous structures appear normal.    IMPRESSION:    No acute intracranial pathology.    Mild chronic microvascular ischemic changes and small chronic lacunar   infarcts.    --- End of Report ---            LOLY VEGA MD; Attending Radiologist  This document has been electronically signed. Dec 29 2023  3:22PM    < end of copied text >  < from: VA Duplex Carotid, Bilat (12.29.23 @ 12:28) >  ******PRELIMINARY REPORT******      ******PRELIMINARY REPORT******         ACC: [id number] EXAM:  CAROTID DUPLEX COMPLETE BILAT   ORDERED BY: TARYN SIERRA     PROCEDURE DATE:  12/29/2023    ******PRELIMINARY REPORT******      ******PRELIMINARY REPORT******           INTERPRETATION:  CLINICAL INFORMATION: 68-year-old male with dizziness    TECHNIQUE: Grayscale, color and spectral Doppler examination of both   carotid arteries was performed.    FINDINGS:    No elevated velocities or abnormal waveforms are encountered.    Peak systolic velocities are as follows:    RIGHT:  PROX CCA = 79 cm/s  DIST CCA = 84 cm/s  PROX ICA = 196 cm/s  DIST ICA = 111 cm/s  ECA = 104 cm/s  ICA/CCA 2.3    LEFT:  PROX CCA = 118 cm/s  DIST CCA = 110 cm/s  PROX ICA = 146 cm/s  DIST ICA = 127 cm/s  ECA = 146 cm/s  ICA/CCA 1.3    Antegrade flow is noted within both vertebral arteries.    IMPRESSION: No significant hemodynamic stenosis of either carotid artery.   40-59% stenosis bilaterally.    Measurement of carotid stenosis is based on velocity parameters that   correlate the residual internal carotid diameter with that of the more   distal vessel in accordance with a method such as the North American   Symptomatic Carotid Endarterectomy Trial (NASCET).        ******PRELIMINARY REPORT******      ******PRELIMINARY     < end of copied text >  < from: CT Head No Cont (12.29.23 @ 03:49) >  ACC: [id number] EXAM:  CT BRAIN   ORDERED BY: CLAUDIA OTT     PROCEDURE DATE:  12/29/2023          INTERPRETATION:  CLINICAL INDICATION: Altered mental status.    Technique: CT of the head was performed without contrast.    Multiple contiguous axial images were acquired from the skullbase to the   vertex without the administration of intravenous contrast.  Coronal and   sagittal reformations were made.    COMPARISON: None    FINDINGS:    There is generalized cortical volume loss with commensurateventricular   dilation. There is no hydrocephalus. .    There is a chronic left caudate head lacunar infarct. There is patchy   periventricular white matter hypodensity. There is no intraparenchymal   hematoma, mass effect or midline shift. No abnormal extra-axial fluid   collections are present.    The calvarium is intact. The visualized intraorbital compartments and   mastoid complexes appear free of acute disease. There is polypoid mucosal   thickening of the left sphenoid sinus. The remainingvisualized paranasal   sinuses are unremarkable.    IMPRESSION:  No acute intracranial pathology. No evidence of midline shift, mass   effect or intracranial hemorrhage.    Mild chronic microvascular type changes as well as a chronic left caudate   lacunar infarct.    --- End of Report ---            JASPREET BAUMAN MD; Attending Radiologist  This document has been electronically signed. Dec 29 2023  9:40A    < end of copied text >  < from: CT Angio Chest PE Protocol w/ IV Cont (12.27.23 @ 21:07) >  ACC: [id number] EXAM:  CT ANGIO CHEST PULM ART WAWIC   ORDERED BY: HOLLY ALY     PROCEDURE DATE:  12/27/2023          INTERPRETATION:  CLINICAL HISTORY / REASON FOR EXAM: Worsening shortness   of breath.    TECHNIQUE: Multislice helical sectionswere obtained from the thoracic   inlet to the lung bases during rapid administration of 75 cc Omnipaque   350 intravenous contrast using a CTA protocol. Thin sections were   reconstructed through the pulmonary vasculature. Sagittal and coronal   reformatted images were acquired, as well as MIP reconstructed images.    COMPARISON: Noncontrast chest CT 12/9/2022    FINDINGS:  Pulmonary embolus: No CT evidence of acute pulmonary embolus.    Lungs/Airways/Pleura: No acute lobar consolidation. No pleural effusion   or pneumothorax. No bronchiectasis. No honeycombing. Small patchy   groundglass density in the right upper lobe (series 4 image 115; series   603 image 140) adjacent to a slightly thickened bronchiole, likely   related to small airwaysinflammation though nonspecific. Bibasilar   subsegmental atelectasis. At the left base atelectasis appears somewhat   more nodular.    Heart/Vascular: Coronary and aortic atherosclerotic calcifications.   Aortic valve replacement. No pericardial effusion    Mediastinum/Lymph nodes: Few right perihilar lymph nodes measuring up to   1.2 cm in short axis, nonspecific, possibly reactive    Visualized upper abdomen: Cholelithiasis    Bones/soft tissues: Sternotomy. Degenerative changes along the vertebral   column. Multiple chronic rib fractures.    IMPRESSION:  1.  No evidence of acute pulmonary embolus.  2.  No acute lobar consolidation.  3.  Small patchy groundglass density in the right upper lobe adjacent to   a slightly thickened bronchiole,nonspecific though likely related to   small airways inflammation.  4.  Few right perihilar lymph nodes, likely reactive.    --- End of Report ---            PURVI LEONE MD; Attending Radiologist  This document has been electronically signed. Dec [id number] 10:50PM    < end of copied text >             Chart reviewed, patient examined. Pertinent results reviewed.  Case discussed with HO; specialist f/u reviewed  HD#3; much improved since admission.    Patient is a 68y old Male who presents with a chief complaint of SOB (28 Dec 2023 09:00)    Primary diagnosis of COPD with respiratory distress, acute; Encephalopathy 2/2 hypoxia.  All due to URi/ COPD exacerbation.    HPI:  59 yo man active smoke, known COPD on BiPAP at night, hx of Rrt DVT, factor V Leiden,  HTN, DL, and hx of AVR (Bioprosthetic , 2017) presenting for confusion and slurred speech which was noticed by his family.  He admits to feeling worsening shortness of breath with increasing cough and sputum associated with worsening lower ext swelling, orthopnea and PND over past 3-4 days. Patient tis not compliant with BiPAP, lasix  or inhalers. Denies purulent sputum, sick contacts, fever, chills, chest pain/    In ED he was found to be hypoxemic and started on O2 then switched to BiPAP after he developed Respiratory acidosis.Work up showed pulmonary congestion, elevated Pro-BNP  CTA showed no PE.  He was given lasix and admitted for further management       This morning patient was seen and examined at bedside, resting comfortably in bed.   He is feeling significantly better than PTA. No nightmares, hallucinations or slurring.  No acute or major events overnight. He used BIPAP overnight. Slept well.         REVIEW OF SYSTEMS:  CONSTITUTIONAL: No fever, weight loss, or fatigue; poor sleep- had recent nightmares;  EYES: No eye pain, visual disturbances, or discharge  ENMT:  No difficulty hearing, tinnitus, vertigo; No sinus or throat pain  NECK: No pain or stiffness  RESPIRATORY: + cough,+ wheezing,+ chills   shortness of breath+  CARDIOVASCULAR: No chest pain, palpitations, dizziness, + leg swelling- better w diuretic and Rx for DVT  GASTROINTESTINAL: No abdominal or epigastric pain. No nausea, vomiting, or hematemesis; No diarrhea or constipation. No melena or hematochezia.  GENITOURINARY: No dysuria, frequency, hematuria, or incontinence; + nocturia  MUSCULOSKELETAL: No joint pain or swelling; No muscle, back, or extremity pain  PSYCHIATRIC: No depression, anxiety, mood swings, or difficulty sleeping  HEME/LYMPH: No easy bruising, or bleeding gums  ALLERY AND IMMUNOLOGIC: No hives or eczema  NEURO: dysartria; felt foggy; ? hallucinations; says + nightmares; no HA        Code Status: Full    PAST MEDICAL & SURGICAL HISTORY  HTN (hypertension)    Dyslipidemia    Factor V Leiden    DVT, lower extremity-unprovoked 4 mos ago- on AC    COPD, moderate    S/P AVR (aortic valve replacement)      SOCIAL HISTORY:  Social History: Still smokes 1/2 PPD  Retired [employer] w [employer]-9/11 exposure and F/U      ALLERGIES:  No Known Allergies    MEDICATIONS:  MEDICATIONS  (STANDING):  albuterol/ipratropium for Nebulization 3 milliLiter(s) Nebulizer every 4 hours  atorvastatin 80 milliGRAM(s) Oral at bedtime  azithromycin  IVPB 500 milliGRAM(s) IV Intermittent every 24 hours  budesonide 160 MICROgram(s)/formoterol 4.5 MICROgram(s) Inhaler 2 Puff(s) Inhalation two times a day  budesonide 160 MICROgram(s)/formoterol 4.5 MICROgram(s) Inhaler 2 Puff(s) Inhalation two times a day  dapagliflozin 10 milliGRAM(s) Oral every 24 hours-----just ordered - as per Cardio  furosemide    Tablet 40 milliGRAM(s) Oral daily  guaiFENesin  milliGRAM(s) Oral every 12 hours  hydrochlorothiazide 12.5 milliGRAM(s) Oral daily  metoprolol succinate  milliGRAM(s) Oral daily  nicotine -  14 mG/24Hr(s) Patch 1 Patch Transdermal daily  NIFEdipine XL 60 milliGRAM(s) Oral daily  pantoprazole    Tablet 40 milliGRAM(s) Oral before breakfast  predniSONE   Tablet 40 milliGRAM(s) Oral daily  rivaroxaban 20 milliGRAM(s) Oral with dinner  tiotropium 2.5 MICROgram(s) Inhaler 2 Puff(s) Inhalation daily  tiotropium 2.5 MICROgram(s) Inhaler 2 Puff(s) Inhalation daily  valsartan 320 milliGRAM(s) Oral daily    MEDICATIONS  (PRN):    VITALS:   Vital Signs Last 24 Hrs  T(C): 36.2 (30 Dec 2023 07:47), Max: 36.8 (29 Dec 2023 16:00)  T(F): 97.1 (30 Dec 2023 07:47), Max: 98.2 (29 Dec 2023 16:00)  HR: 79 (30 Dec 2023 07:47) (77 - 96)  BP: 148/70 (30 Dec 2023 07:47) (128/58 - 148/70)  BP(mean): --  RR: 18 (30 Dec 2023 05:36) (18 - 18)  SpO2: 97% (30 Dec 2023 07:47) (97% - 98%)    Parameters below as of 30 Dec 2023 07:47  Patient On (Oxygen Delivery Method): nasal cannula   97% on 4l/m; 93% on 3//m  walked w/o O2- POx to 85%- w patient not c/o SOB        PHYSICAL EXAM:  GENERAL: NAD, lying in bed- 4l/m O2 by NC; AAOx4; much coughing after walking- grey sputum.  HEAD:  Atraumatic, Normocephalic  EYES: EOMI, PERRLA, conjunctiva and sclera clear  ENMT: No tonsillar erythema, exudates, or enlargement; 3/4 airway  NECK: Supple, No JVD, Normal thyroid  NERVOUS SYSTEM:  Alert & Oriented X3, Good concentration;   CHEST/LUNG: DECreased BS; occ exp wheeze R>L  HEART: scar; RRR; No murmurs, rubs, or gallops  ABDOMEN: Soft, Nontender, Nondistended; Bowel sounds present  EXTREMITIES:  , No clubbing, cyanosis, or edema  LYMPH: No lymphadenopathy noted  SKIN: No rashes or lesions    LABS:                                12.5   8.16  )-----------( 249      ( 30 Dec 2023 06:40 )             39.7                 12.7   8.68  )-----------( 262      ( 29 Dec 2023 06:06 )             39.9     12-30    147<H>  |  106  |  37<H>  ----------------------------<  107<H>  5.5<H>   |  33<H>  |  1.2    Ca    8.6      30 Dec 2023 06:40  Mg     2.5     12-30    TPro  6.2  /  Alb  3.1<L>  /  TBili  <0.2  /  DBili  x   /  AST  21  /  ALT  22  /  AlkPhos  106  12-30    12-29    143  |  102  |  37<H>  ----------------------------<  120<H>  5.0   |  31  |  1.2    Ca    8.7      29 Dec 2023 06:06  Mg     2.5     12-29    TPro  6.5  /  Alb  3.3<L>  /  TBili  <0.2  /  DBili  x   /  AST  20  /  ALT  23  /  AlkPhos  124<H>  12-29      Urinalysis Basic - ( 29 Dec 2023 06:06 )    Color: x / Appearance: x / SG: x / pH: x  Gluc: 120 mg/dL / Ketone: x  / Bili: x / Urobili: x   Blood: x / Protein: x / Nitrite: x   Leuk Esterase: x / RBC: x / WBC x   Sq Epi: x / Non Sq Epi: x / Bacteria: x      ABG - ( 28 Dec 2023 03:46 )  pH, Arterial: 7.40  pH, Blood: x     /  pCO2: 55    /  pO2: 55    / HCO3: 34    / Base Excess: 7.5   /  SaO2: 84.1                      RADIOLOGY:   as noted below  I REV'D OP ECHO FROM 10/23:  LVF-OK; EF 55-60%;  LA ENLARGEMENT; +BIOPROSTHETIC AV    < from: MR Head No Cont (12.29.23 @ 15:04) >  ACC: [id number] EXAM:  MR BRAIN   ORDERED BY: TARYN SIERRA     PROCEDURE DATE:  12/29/2023          INTERPRETATION:  CLINICAL INDICATION: Altered mental status.    TECHNIQUE: Multi-planar multi-sequential MR imaging of the brain was   performed without intravenous contrast.    COMPARISON: Correlated with the same day CT head .    FINDINGS:    There is prominence of the sulci, sylvian fissures, and ventricles likely   reflecting mild diffuse parenchymal volume loss.    There are scattered patchyT2/FLAIR hyperintensities in bilateral   periventricular cerebral white matter, consistent with mild chronic   microvascular ischemic changes.    There are small chronic lacunar infarct in the left corona radiata and   left cerebellum.    No acute infarction, intracranial hemorrhage or mass.    There is no evidence of hydrocephalus. There are no extra-axial fluid   collections. The skull base flow voids are present.    The visualized intraorbital contents are normal. Mild mucosal thickening   in the left sphenoid sinus. The mastoid air cells are clear. The   visualized soft tissues and osseous structures appear normal.    IMPRESSION:    No acute intracranial pathology.    Mild chronic microvascular ischemic changes and small chronic lacunar   infarcts.    --- End of Report ---            LOLY VEGA MD; Attending Radiologist  This document has been electronically signed. Dec 29 2023  3:22PM    < end of copied text >  < from: VA Duplex Carotid, Bilat (12.29.23 @ 12:28) >  ******PRELIMINARY REPORT******      ******PRELIMINARY REPORT******         ACC: [id number] EXAM:  CAROTID DUPLEX COMPLETE BILAT   ORDERED BY: TARYN SIERRA     PROCEDURE DATE:  12/29/2023    ******PRELIMINARY REPORT******      ******PRELIMINARY REPORT******           INTERPRETATION:  CLINICAL INFORMATION: 68-year-old male with dizziness    TECHNIQUE: Grayscale, color and spectral Doppler examination of both   carotid arteries was performed.    FINDINGS:    No elevated velocities or abnormal waveforms are encountered.    Peak systolic velocities are as follows:    RIGHT:  PROX CCA = 79 cm/s  DIST CCA = 84 cm/s  PROX ICA = 196 cm/s  DIST ICA = 111 cm/s  ECA = 104 cm/s  ICA/CCA 2.3    LEFT:  PROX CCA = 118 cm/s  DIST CCA = 110 cm/s  PROX ICA = 146 cm/s  DIST ICA = 127 cm/s  ECA = 146 cm/s  ICA/CCA 1.3    Antegrade flow is noted within both vertebral arteries.    IMPRESSION: No significant hemodynamic stenosis of either carotid artery.   40-59% stenosis bilaterally.    Measurement of carotid stenosis is based on velocity parameters that   correlate the residual internal carotid diameter with that of the more   distal vessel in accordance with a method such as the North American   Symptomatic Carotid Endarterectomy Trial (NASCET).        ******PRELIMINARY REPORT******      ******PRELIMINARY     < end of copied text >  < from: CT Head No Cont (12.29.23 @ 03:49) >  ACC: [id number] EXAM:  CT BRAIN   ORDERED BY: CLAUDIA OTT     PROCEDURE DATE:  12/29/2023          INTERPRETATION:  CLINICAL INDICATION: Altered mental status.    Technique: CT of the head was performed without contrast.    Multiple contiguous axial images were acquired from the skullbase to the   vertex without the administration of intravenous contrast.  Coronal and   sagittal reformations were made.    COMPARISON: None    FINDINGS:    There is generalized cortical volume loss with commensurateventricular   dilation. There is no hydrocephalus. .    There is a chronic left caudate head lacunar infarct. There is patchy   periventricular white matter hypodensity. There is no intraparenchymal   hematoma, mass effect or midline shift. No abnormal extra-axial fluid   collections are present.    The calvarium is intact. The visualized intraorbital compartments and   mastoid complexes appear free of acute disease. There is polypoid mucosal   thickening of the left sphenoid sinus. The remainingvisualized paranasal   sinuses are unremarkable.    IMPRESSION:  No acute intracranial pathology. No evidence of midline shift, mass   effect or intracranial hemorrhage.    Mild chronic microvascular type changes as well as a chronic left caudate   lacunar infarct.    --- End of Report ---            JASPREET BAUMAN MD; Attending Radiologist  This document has been electronically signed. Dec 29 2023  9:40A    < end of copied text >  < from: CT Angio Chest PE Protocol w/ IV Cont (12.27.23 @ 21:07) >  ACC: [id number] EXAM:  CT ANGIO CHEST PULM ART WAWIC   ORDERED BY: HOLLY ALY     PROCEDURE DATE:  12/27/2023          INTERPRETATION:  CLINICAL HISTORY / REASON FOR EXAM: Worsening shortness   of breath.    TECHNIQUE: Multislice helical sectionswere obtained from the thoracic   inlet to the lung bases during rapid administration of 75 cc Omnipaque   350 intravenous contrast using a CTA protocol. Thin sections were   reconstructed through the pulmonary vasculature. Sagittal and coronal   reformatted images were acquired, as well as MIP reconstructed images.    COMPARISON: Noncontrast chest CT 12/9/2022    FINDINGS:  Pulmonary embolus: No CT evidence of acute pulmonary embolus.    Lungs/Airways/Pleura: No acute lobar consolidation. No pleural effusion   or pneumothorax. No bronchiectasis. No honeycombing. Small patchy   groundglass density in the right upper lobe (series 4 image 115; series   603 image 140) adjacent to a slightly thickened bronchiole, likely   related to small airwaysinflammation though nonspecific. Bibasilar   subsegmental atelectasis. At the left base atelectasis appears somewhat   more nodular.    Heart/Vascular: Coronary and aortic atherosclerotic calcifications.   Aortic valve replacement. No pericardial effusion    Mediastinum/Lymph nodes: Few right perihilar lymph nodes measuring up to   1.2 cm in short axis, nonspecific, possibly reactive    Visualized upper abdomen: Cholelithiasis    Bones/soft tissues: Sternotomy. Degenerative changes along the vertebral   column. Multiple chronic rib fractures.    IMPRESSION:  1.  No evidence of acute pulmonary embolus.  2.  No acute lobar consolidation.  3.  Small patchy groundglass density in the right upper lobe adjacent to   a slightly thickened bronchiole,nonspecific though likely related to   small airways inflammation.  4.  Few right perihilar lymph nodes, likely reactive.    --- End of Report ---            PURVI LEONE MD; Attending Radiologist  This document has been electronically signed. Dec [id number] 10:50PM    < end of copied text >

## 2023-12-30 NOTE — CONSULT NOTE ADULT - ATTENDING COMMENTS
Mr. Townsend was seen and examined at bedside today, patient initially presented with upper respiratory symptoms which progressed to shortness of breath, wheezing, and altered mental status.  Patient does carry history of active tobacco use, COPD, and factor V Leiden.  Pulmonary was consulted for further evaluation for ongoing dyspnea, likely in the setting of acute exacerbation of COPD.  Patient will require follow-up with pulmonary after discharge for an incidental pulmonary nodule, as well as ongoing LDCT screening.  Recommend tobacco cessation.  Patient should also be diuresed to euvolemia given his lower extremity edema.  Most likely, his respiratory distress however is secondary to an acute exacerbation of COPD.  Recommend obtaining expanded RVP, starting patient on Trelegy on discharge (use formulary equivalents while admitted), and completing a minimum of 5 days of 40 mg of prednisone for what appears to be an acute exacerbation of his COPD brought on by a viral etiology.  I agree with the fellow note, with the exceptions listed in my attestation above.  The remainder of impression and plan per fellow note.
Patient seen and examined and agree with above except as noted.  Patients history, notes, labs, imaging, vitals and meds reviewed personally.  Had dysarthria on admission which has resolved.  Currently with negative myoclonus  Suggestive of toxic/metabolic etiology    Plan as above

## 2023-12-30 NOTE — CONSULT NOTE ADULT - SUBJECTIVE AND OBJECTIVE BOX
Neurology Consult    Patient is a 68y old  Male who presents with a chief complaint of SOB (30 Dec 2023 12:08)    Neurology consulted for dysarthria.   Pt stated that 3 days ago he was having difficulty breathing and on his way to dinner w/ his wife he had a quick episode of visual hallucination. At this time his wife told him that he was disoriented. He stated that he does not remember having a slurred speech but when his breathing was bad he had difficulty with his voice. He denied any recent infection. He had no difficulty with swallowing solids or liquids during dinner. His voice is now better after the medication he received (likely steroids).       HPI:  61 yo man active smoke, known COPD on BiPAP at night, hx of Rrt DVT, factor V Leiden,  HTN, DL, and hx of AVR (Bioprosthetic , 2017) presenting for confusion and slurred speech which was noticed by his family.  He admits to feeling worsening shortness of breath with increasing cough and sputum associated with worsening lower ext swelling, orthopnea and PND over past 3-4 days. Patient tis not compliant with BiPAP, lasix  or inhalers. Denies purulent sputum, sick contacts, fever, chills, chest pain/    In ED he was found to be hypoxemic and started on O2 then switched to BiPAP after he developed Respiratory acidosis.Work up showed pulmonary congestion, elevated Pro-BNP  CTA showed no PE.  He was given lasix and admitted for further management     (28 Dec 2023 03:48)      PAST MEDICAL & SURGICAL HISTORY:  HTN (hypertension)      Dyslipidemia      Factor V Leiden      DVT, lower extremity      COPD, moderate      S/P AVR (aortic valve replacement)          FAMILY HISTORY:      Social History: (-) x 3    Allergies    No Known Allergies    Intolerances        MEDICATIONS  (STANDING):  albuterol/ipratropium for Nebulization 3 milliLiter(s) Nebulizer every 4 hours  atorvastatin 80 milliGRAM(s) Oral at bedtime  azithromycin  IVPB 500 milliGRAM(s) IV Intermittent every 24 hours  budesonide 160 MICROgram(s)/formoterol 4.5 MICROgram(s) Inhaler 2 Puff(s) Inhalation two times a day  budesonide 160 MICROgram(s)/formoterol 4.5 MICROgram(s) Inhaler 2 Puff(s) Inhalation two times a day  dapagliflozin 10 milliGRAM(s) Oral every 24 hours  furosemide    Tablet 40 milliGRAM(s) Oral daily  guaiFENesin  milliGRAM(s) Oral every 12 hours  hydrochlorothiazide 12.5 milliGRAM(s) Oral daily  metoprolol succinate  milliGRAM(s) Oral daily  nicotine -  14 mG/24Hr(s) Patch 1 Patch Transdermal daily  NIFEdipine XL 60 milliGRAM(s) Oral daily  pantoprazole    Tablet 40 milliGRAM(s) Oral before breakfast  predniSONE   Tablet 40 milliGRAM(s) Oral daily  rivaroxaban 20 milliGRAM(s) Oral with dinner  tiotropium 2.5 MICROgram(s) Inhaler 2 Puff(s) Inhalation daily  tiotropium 2.5 MICROgram(s) Inhaler 2 Puff(s) Inhalation daily  valsartan 320 milliGRAM(s) Oral daily    MEDICATIONS  (PRN):      Review of systems:    Constitutional: as per HPI  Eyes: No eye pain or discharge  ENMT:  No difficulty hearing; No sinus or throat pain  Neck: No pain or stiffness  Respiratory: No cough, wheezing, chills or hemoptysis  Cardiovascular: No chest pain, palpitations, shortness of breath, dyspnea on exertion  Gastrointestinal: No abdominal pain, nausea, vomiting or hematemesis; No diarrhea or constipation.   Genitourinary: No dysuria, frequency, hematuria or incontinence  Neurological: As per HPI  Skin: No rashes or lesions   Endocrine: No heat or cold intolerance; No hair loss  Musculoskeletal: No joint pain or swelling  Psychiatric: No depression, anxiety, mood swings  Heme/Lymph: No easy bruising or bleeding gums    Vital Signs Last 24 Hrs  T(C): 36.2 (30 Dec 2023 07:47), Max: 36.8 (29 Dec 2023 16:00)  T(F): 97.1 (30 Dec 2023 07:47), Max: 98.2 (29 Dec 2023 16:00)  HR: 79 (30 Dec 2023 07:47) (77 - 96)  BP: 148/70 (30 Dec 2023 07:47) (128/58 - 148/70)  BP(mean): --  RR: 18 (30 Dec 2023 05:36) (18 - 18)  SpO2: 97% (30 Dec 2023 07:47) (97% - 98%)    Parameters below as of 30 Dec 2023 07:47  Patient On (Oxygen Delivery Method): nasal cannula        Examination:  General:  Appearance is consistent with chronologic age.  No abnormal facies.  Gross skin survey within normal limits.    Cognitive/Language:  The patient is oriented to person, place, time and date.  Recent and remote memory intact.  Fund of knowledge is intact and normal.  Language with normal repetition, comprehension and naming.  Nondysarthric.    Eyes: intact VA, VFF.  EOMI w/o nystagmus, skew or reported double vision.  PERRL.  No ptosis/weakness of eyelid closure.    Face:  Facial sensation normal V1 - 3, no facial asymmetry.    Ears/Nose/Throat:  Hearing grossly intact b/l.  Palate elevates midline.  Tongue and uvula midline.   Motor examination:   Normal tone, bulk and range of motion.  No tenderness, twitching, tremors or involuntary movements.  Formal Muscle Strength Testing: (MRC grade R/L) 5/5 UE; 5/5 LE.  No observable drift.  Reflexes:   2+ b/l pectoralis, biceps, triceps, brachioradialis, patella and Achilles.  Plantar response downgoing b/l.  Jaw jerk, Reed, clonus absent.  Sensory examination:   Intact to light touch and pinprick, pain, temperature and proprioception and vibration in all extremities.  Cerebellum:   FTN/HKS intact with normal SHERIE in all limbs.  No dysmetria or dysdiadokinesia.  Gait narrow based and normal.    Respiratory:  no audible wheezing or inspiratory stridor.  no use of accessory muscles.   Cardiac: pulse palpable, no audible bruits  Abdomen: supple, no guarding, no TTP    Labs:   CBC Full  -  ( 30 Dec 2023 06:40 )  WBC Count : 8.16 K/uL  RBC Count : 3.74 M/uL  Hemoglobin : 12.5 g/dL  Hematocrit : 39.7 %  Platelet Count - Automated : 249 K/uL  Mean Cell Volume : 106.1 fL  Mean Cell Hemoglobin : 33.4 pg  Mean Cell Hemoglobin Concentration : 31.5 g/dL  Auto Neutrophil # : 5.90 K/uL  Auto Lymphocyte # : 1.26 K/uL  Auto Monocyte # : 0.84 K/uL  Auto Eosinophil # : 0.06 K/uL  Auto Basophil # : 0.05 K/uL  Auto Neutrophil % : 72.4 %  Auto Lymphocyte % : 15.4 %  Auto Monocyte % : 10.3 %  Auto Eosinophil % : 0.7 %  Auto Basophil % : 0.6 %    12-30    147<H>  |  106  |  37<H>  ----------------------------<  107<H>  5.5<H>   |  33<H>  |  1.2    Ca    8.6      30 Dec 2023 06:40  Mg     2.5     12-30    TPro  6.2  /  Alb  3.1<L>  /  TBili  <0.2  /  DBili  x   /  AST  21  /  ALT  22  /  AlkPhos  106  12-30    LIVER FUNCTIONS - ( 30 Dec 2023 06:40 )  Alb: 3.1 g/dL / Pro: 6.2 g/dL / ALK PHOS: 106 U/L / ALT: 22 U/L / AST: 21 U/L / GGT: x             Urinalysis Basic - ( 30 Dec 2023 06:40 )    Color: x / Appearance: x / SG: x / pH: x  Gluc: 107 mg/dL / Ketone: x  / Bili: x / Urobili: x   Blood: x / Protein: x / Nitrite: x   Leuk Esterase: x / RBC: x / WBC x   Sq Epi: x / Non Sq Epi: x / Bacteria: x          Neuroimaging:  [facility]:     12-30-23 @ 15:05       Neurology Consult    Patient is a 68y old  Male who presents with a chief complaint of SOB (30 Dec 2023 12:08)    Neurology consulted for dysarthria.   Pt stated that 3 days ago he was having difficulty breathing and on his way to dinner w/ his wife he had a quick episode of visual hallucination. At this time his wife told him that he was disoriented. He stated that he does not remember having a slurred speech but when his breathing was bad he had difficulty with his voice. He denied any recent infection. He had no difficulty with swallowing solids or liquids during dinner. His voice is now better after the medication he received (likely steroids).       HPI:  61 yo man active smoke, known COPD on BiPAP at night, hx of Rrt DVT, factor V Leiden,  HTN, DL, and hx of AVR (Bioprosthetic , 2017) presenting for confusion and slurred speech which was noticed by his family.  He admits to feeling worsening shortness of breath with increasing cough and sputum associated with worsening lower ext swelling, orthopnea and PND over past 3-4 days. Patient tis not compliant with BiPAP, lasix  or inhalers. Denies purulent sputum, sick contacts, fever, chills, chest pain/    In ED he was found to be hypoxemic and started on O2 then switched to BiPAP after he developed Respiratory acidosis.Work up showed pulmonary congestion, elevated Pro-BNP  CTA showed no PE.  He was given lasix and admitted for further management     (28 Dec 2023 03:48)      PAST MEDICAL & SURGICAL HISTORY:  HTN (hypertension)      Dyslipidemia      Factor V Leiden      DVT, lower extremity      COPD, moderate      S/P AVR (aortic valve replacement)          FAMILY HISTORY:      Social History: (-) x 3    Allergies    No Known Allergies    Intolerances        MEDICATIONS  (STANDING):  albuterol/ipratropium for Nebulization 3 milliLiter(s) Nebulizer every 4 hours  atorvastatin 80 milliGRAM(s) Oral at bedtime  azithromycin  IVPB 500 milliGRAM(s) IV Intermittent every 24 hours  budesonide 160 MICROgram(s)/formoterol 4.5 MICROgram(s) Inhaler 2 Puff(s) Inhalation two times a day  budesonide 160 MICROgram(s)/formoterol 4.5 MICROgram(s) Inhaler 2 Puff(s) Inhalation two times a day  dapagliflozin 10 milliGRAM(s) Oral every 24 hours  furosemide    Tablet 40 milliGRAM(s) Oral daily  guaiFENesin  milliGRAM(s) Oral every 12 hours  hydrochlorothiazide 12.5 milliGRAM(s) Oral daily  metoprolol succinate  milliGRAM(s) Oral daily  nicotine -  14 mG/24Hr(s) Patch 1 Patch Transdermal daily  NIFEdipine XL 60 milliGRAM(s) Oral daily  pantoprazole    Tablet 40 milliGRAM(s) Oral before breakfast  predniSONE   Tablet 40 milliGRAM(s) Oral daily  rivaroxaban 20 milliGRAM(s) Oral with dinner  tiotropium 2.5 MICROgram(s) Inhaler 2 Puff(s) Inhalation daily  tiotropium 2.5 MICROgram(s) Inhaler 2 Puff(s) Inhalation daily  valsartan 320 milliGRAM(s) Oral daily    MEDICATIONS  (PRN):      Review of systems:    Constitutional: as per HPI  Eyes: No eye pain or discharge  ENMT:  No difficulty hearing; No sinus or throat pain  Neck: No pain or stiffness  Respiratory: No cough, wheezing, chills or hemoptysis  Cardiovascular: No chest pain, palpitations, shortness of breath, dyspnea on exertion  Gastrointestinal: No abdominal pain, nausea, vomiting or hematemesis; No diarrhea or constipation.   Genitourinary: No dysuria, frequency, hematuria or incontinence  Neurological: As per HPI  Skin: No rashes or lesions   Endocrine: No heat or cold intolerance; No hair loss  Musculoskeletal: No joint pain or swelling  Psychiatric: No depression, anxiety, mood swings  Heme/Lymph: No easy bruising or bleeding gums    Vital Signs Last 24 Hrs  T(C): 36.2 (30 Dec 2023 07:47), Max: 36.8 (29 Dec 2023 16:00)  T(F): 97.1 (30 Dec 2023 07:47), Max: 98.2 (29 Dec 2023 16:00)  HR: 79 (30 Dec 2023 07:47) (77 - 96)  BP: 148/70 (30 Dec 2023 07:47) (128/58 - 148/70)  BP(mean): --  RR: 18 (30 Dec 2023 05:36) (18 - 18)  SpO2: 97% (30 Dec 2023 07:47) (97% - 98%)    Parameters below as of 30 Dec 2023 07:47  Patient On (Oxygen Delivery Method): nasal cannula        Examination:  General:  Appearance is consistent with chronologic age.  No abnormal facies.  Gross skin survey within normal limits.    Cognitive/Language:  The patient is oriented to person, place, time and date.  Recent and remote memory intact.  Fund of knowledge is intact and normal.  Language with normal repetition, comprehension and naming.  Nondysarthric.    Eyes: intact VA, VFF.  EOMI w/o nystagmus, skew or reported double vision.  PERRL.  No ptosis/weakness of eyelid closure.    Face:  Facial sensation normal V1 - 3, no facial asymmetry.    Ears/Nose/Throat:  Hearing grossly intact b/l.  Palate elevates midline.  Tongue and uvula midline.   Motor examination:   Normal tone, bulk and range of motion.  No tenderness, twitching, tremors or involuntary movements.  Formal Muscle Strength Testing: (MRC grade R/L) 5/5 UE; 5/5 LE.  No observable drift.  Reflexes:   2+ b/l pectoralis, biceps, triceps, brachioradialis, patella and Achilles.  Plantar response downgoing b/l.  Jaw jerk, Reed, clonus absent.  Sensory examination:   Intact to light touch and pinprick, pain, temperature and proprioception and vibration in all extremities.  Cerebellum:   FTN/HKS intact with normal SHERIE in all limbs.  No dysmetria or dysdiadokinesia.  Gait narrow based and normal.    Respiratory:  no audible wheezing or inspiratory stridor.  no use of accessory muscles.   Cardiac: pulse palpable, no audible bruits  Abdomen: supple, no guarding, no TTP    Labs:   CBC Full  -  ( 30 Dec 2023 06:40 )  WBC Count : 8.16 K/uL  RBC Count : 3.74 M/uL  Hemoglobin : 12.5 g/dL  Hematocrit : 39.7 %  Platelet Count - Automated : 249 K/uL  Mean Cell Volume : 106.1 fL  Mean Cell Hemoglobin : 33.4 pg  Mean Cell Hemoglobin Concentration : 31.5 g/dL  Auto Neutrophil # : 5.90 K/uL  Auto Lymphocyte # : 1.26 K/uL  Auto Monocyte # : 0.84 K/uL  Auto Eosinophil # : 0.06 K/uL  Auto Basophil # : 0.05 K/uL  Auto Neutrophil % : 72.4 %  Auto Lymphocyte % : 15.4 %  Auto Monocyte % : 10.3 %  Auto Eosinophil % : 0.7 %  Auto Basophil % : 0.6 %    12-30    147<H>  |  106  |  37<H>  ----------------------------<  107<H>  5.5<H>   |  33<H>  |  1.2    Ca    8.6      30 Dec 2023 06:40  Mg     2.5     12-30    TPro  6.2  /  Alb  3.1<L>  /  TBili  <0.2  /  DBili  x   /  AST  21  /  ALT  22  /  AlkPhos  106  12-30    LIVER FUNCTIONS - ( 30 Dec 2023 06:40 )  Alb: 3.1 g/dL / Pro: 6.2 g/dL / ALK PHOS: 106 U/L / ALT: 22 U/L / AST: 21 U/L / GGT: x             Urinalysis Basic - ( 30 Dec 2023 06:40 )    Color: x / Appearance: x / SG: x / pH: x  Gluc: 107 mg/dL / Ketone: x  / Bili: x / Urobili: x   Blood: x / Protein: x / Nitrite: x   Leuk Esterase: x / RBC: x / WBC x   Sq Epi: x / Non Sq Epi: x / Bacteria: x          Neuroimaging:  [facility]:     12-30-23 @ 15:05

## 2023-12-30 NOTE — PROGRESS NOTE ADULT - ASSESSMENT
61 yo man active smoke, known COPD, hx of Rrt DVT, factor V Leiden,  HTN, DL, and hx of AVR (Bioprosthetic , 2017) presenting Hypoxic, hypercapnic respiratory failure 2/2 AECOPD and Decompensated HFpEF      #AMS on admission  -resolved  -CTH- noted  -MRI HEAD-noted- + old lacuna infarcts. no acute changes;         carotids w mod ob'n 2/2 plaques.  - neuro consult- requested 2nd time- pending  - get carotid duplex please    #AHRF  #Acute on chronic respiratory acidosis  #AECOPD  #AMS/CO2 Narcosis - resolved at time of evaluation  - C/w BD  - solumedrol IV 125mg once then 60bid followed by taper;       will go to qd tomorrow  - azithromycin 500mg qd  - O2 to target SpO2 89-92%- needs for DC- at this time  - BiPAP @ hs. + ANIKET also  -See pulm recommendations- please follow ; taper steroids.      #Decompensated HF  #AVR  #HTN  - Pro-BNP elevated  - ECG and trops unremarkable  - c/w lasix 40mg daily  - c/w metoprolol  - c/w valsartan and nifedipine  - hold hctz  - TTE  - TSH  - strict Is and Os   - O2 and BIPAP as needed  - cardio suggests farxiga-OK- start today 10mg qd.    Smoking still  - strongly advised to DC; contributing factor to his multiple problems.  - Nicotine patch now    ANIKET  - Dx years ago  - non-compliant w machine at home    #hyperkalemia on labs repeat  hx Rt DVT/factor v leiden on rivaroxaban  -ambulate as able in room

## 2023-12-30 NOTE — CONSULT NOTE ADULT - ASSESSMENT
This is a 69yo M w/ h/o COPD (non-compliant w/ Symbicort) on BIPAP HS, current 25ppy smoker, Factor 5, HTN, HLD, AVR (2017) admitted for AHRF. Neurology consulted for dysarthria. Per hx pt was having difficult w/ his breathing and had a muffled voice, felt lighthead and had some visual hallucination. he denied any slurred speech, difficulty swallowing, or loss of taste. Pt is having a regular DASH/TLC diet w/o complain. On exam he is fully oriented, following commands and no dysarthria. however, does have b/l kinetic tremor worst on the left as well as mild asterixis. Unremarkable NCCTH and MRI brain.     Impression   hypophonia 2/2 AHRF   Essential tremor vs EtOH w/drawal   Asterixis   Macrocytic Anemia   Hyperkalemia and Hyperkalemia     Recommendation  check ammonia level   check b12, thiamine and folate   No further Neurological exam     Case to be discussed with attending This is a 67yo M w/ h/o COPD (non-compliant w/ Symbicort) on BIPAP HS, current 25ppy smoker, Factor 5, HTN, HLD, AVR (2017) admitted for AHRF. Neurology consulted for dysarthria. Per hx pt was having difficult w/ his breathing and had a muffled voice, felt lighthead and had some visual hallucination. he denied any slurred speech, difficulty swallowing, or loss of taste. Pt is having a regular DASH/TLC diet w/o complain. On exam he is fully oriented, following commands and no dysarthria. however, does have b/l kinetic tremor worst on the left as well as mild asterixis. Unremarkable NCCTH and MRI brain.     Impression   hypophonia 2/2 AHRF   Essential tremor vs EtOH w/drawal   Asterixis   Macrocytic Anemia   Hyperkalemia and Hyperkalemia     Recommendation  check ammonia level   check b12, thiamine and folate   No further Neurological exam     Case to be discussed with attending This is a 67yo M w/ h/o COPD (non-compliant w/ Symbicort) on BIPAP HS, current 25ppy smoker, Factor 5, HTN, HLD, AVR (2017) admitted for AHRF. Neurology consulted for dysarthria. Per hx pt was having difficult w/ his breathing and had a muffled voice, felt lighthead and had some visual hallucination. he denied any slurred speech, difficulty swallowing, or loss of taste. Pt is having a regular DASH/TLC diet w/o complain. On exam he is fully oriented, following commands and no dysarthria. however, does have b/l kinetic tremor worst on the left as well as mild asterixis. Unremarkable NCCTH and MRI brain.     Impression   hypophonia 2/2 AHRF   Essential tremor vs EtOH w/drawal   Asterixis   Macrocytic Anemia   Hyperkalemia and Hyperkalemia     Recommendation  check ammonia level   check b12, thiamine and folate   No further Neurological testing needed

## 2023-12-31 ENCOUNTER — TRANSCRIPTION ENCOUNTER (OUTPATIENT)
Age: 68
End: 2023-12-31

## 2023-12-31 LAB
ALBUMIN SERPL ELPH-MCNC: 3.2 G/DL — LOW (ref 3.5–5.2)
ALBUMIN SERPL ELPH-MCNC: 3.2 G/DL — LOW (ref 3.5–5.2)
ALP SERPL-CCNC: 106 U/L — SIGNIFICANT CHANGE UP (ref 30–115)
ALP SERPL-CCNC: 106 U/L — SIGNIFICANT CHANGE UP (ref 30–115)
ALT FLD-CCNC: 23 U/L — SIGNIFICANT CHANGE UP (ref 0–41)
ALT FLD-CCNC: 23 U/L — SIGNIFICANT CHANGE UP (ref 0–41)
ANION GAP SERPL CALC-SCNC: 4 MMOL/L — LOW (ref 7–14)
ANION GAP SERPL CALC-SCNC: 4 MMOL/L — LOW (ref 7–14)
AST SERPL-CCNC: 18 U/L — SIGNIFICANT CHANGE UP (ref 0–41)
AST SERPL-CCNC: 18 U/L — SIGNIFICANT CHANGE UP (ref 0–41)
BASOPHILS # BLD AUTO: 0.05 K/UL — SIGNIFICANT CHANGE UP (ref 0–0.2)
BASOPHILS # BLD AUTO: 0.05 K/UL — SIGNIFICANT CHANGE UP (ref 0–0.2)
BASOPHILS NFR BLD AUTO: 0.8 % — SIGNIFICANT CHANGE UP (ref 0–1)
BASOPHILS NFR BLD AUTO: 0.8 % — SIGNIFICANT CHANGE UP (ref 0–1)
BILIRUB SERPL-MCNC: 0.2 MG/DL — SIGNIFICANT CHANGE UP (ref 0.2–1.2)
BILIRUB SERPL-MCNC: 0.2 MG/DL — SIGNIFICANT CHANGE UP (ref 0.2–1.2)
BUN SERPL-MCNC: 27 MG/DL — HIGH (ref 10–20)
BUN SERPL-MCNC: 27 MG/DL — HIGH (ref 10–20)
CALCIUM SERPL-MCNC: 8.5 MG/DL — SIGNIFICANT CHANGE UP (ref 8.4–10.5)
CALCIUM SERPL-MCNC: 8.5 MG/DL — SIGNIFICANT CHANGE UP (ref 8.4–10.5)
CHLORIDE SERPL-SCNC: 106 MMOL/L — SIGNIFICANT CHANGE UP (ref 98–110)
CHLORIDE SERPL-SCNC: 106 MMOL/L — SIGNIFICANT CHANGE UP (ref 98–110)
CO2 SERPL-SCNC: 37 MMOL/L — HIGH (ref 17–32)
CO2 SERPL-SCNC: 37 MMOL/L — HIGH (ref 17–32)
CREAT SERPL-MCNC: 1.1 MG/DL — SIGNIFICANT CHANGE UP (ref 0.7–1.5)
CREAT SERPL-MCNC: 1.1 MG/DL — SIGNIFICANT CHANGE UP (ref 0.7–1.5)
EGFR: 73 ML/MIN/1.73M2 — SIGNIFICANT CHANGE UP
EGFR: 73 ML/MIN/1.73M2 — SIGNIFICANT CHANGE UP
EOSINOPHIL # BLD AUTO: 0.09 K/UL — SIGNIFICANT CHANGE UP (ref 0–0.7)
EOSINOPHIL # BLD AUTO: 0.09 K/UL — SIGNIFICANT CHANGE UP (ref 0–0.7)
EOSINOPHIL NFR BLD AUTO: 1.4 % — SIGNIFICANT CHANGE UP (ref 0–8)
EOSINOPHIL NFR BLD AUTO: 1.4 % — SIGNIFICANT CHANGE UP (ref 0–8)
GLUCOSE SERPL-MCNC: 109 MG/DL — HIGH (ref 70–99)
GLUCOSE SERPL-MCNC: 109 MG/DL — HIGH (ref 70–99)
HCT VFR BLD CALC: 39.4 % — LOW (ref 42–52)
HCT VFR BLD CALC: 39.4 % — LOW (ref 42–52)
HGB BLD-MCNC: 12.6 G/DL — LOW (ref 14–18)
HGB BLD-MCNC: 12.6 G/DL — LOW (ref 14–18)
IMM GRANULOCYTES NFR BLD AUTO: 0.5 % — HIGH (ref 0.1–0.3)
IMM GRANULOCYTES NFR BLD AUTO: 0.5 % — HIGH (ref 0.1–0.3)
LYMPHOCYTES # BLD AUTO: 1.33 K/UL — SIGNIFICANT CHANGE UP (ref 1.2–3.4)
LYMPHOCYTES # BLD AUTO: 1.33 K/UL — SIGNIFICANT CHANGE UP (ref 1.2–3.4)
LYMPHOCYTES # BLD AUTO: 20.3 % — LOW (ref 20.5–51.1)
LYMPHOCYTES # BLD AUTO: 20.3 % — LOW (ref 20.5–51.1)
MAGNESIUM SERPL-MCNC: 2.5 MG/DL — HIGH (ref 1.8–2.4)
MAGNESIUM SERPL-MCNC: 2.5 MG/DL — HIGH (ref 1.8–2.4)
MCHC RBC-ENTMCNC: 32 G/DL — SIGNIFICANT CHANGE UP (ref 32–37)
MCHC RBC-ENTMCNC: 32 G/DL — SIGNIFICANT CHANGE UP (ref 32–37)
MCHC RBC-ENTMCNC: 33.5 PG — HIGH (ref 27–31)
MCHC RBC-ENTMCNC: 33.5 PG — HIGH (ref 27–31)
MCV RBC AUTO: 104.8 FL — HIGH (ref 80–94)
MCV RBC AUTO: 104.8 FL — HIGH (ref 80–94)
MONOCYTES # BLD AUTO: 0.83 K/UL — HIGH (ref 0.1–0.6)
MONOCYTES # BLD AUTO: 0.83 K/UL — HIGH (ref 0.1–0.6)
MONOCYTES NFR BLD AUTO: 12.7 % — HIGH (ref 1.7–9.3)
MONOCYTES NFR BLD AUTO: 12.7 % — HIGH (ref 1.7–9.3)
NEUTROPHILS # BLD AUTO: 4.21 K/UL — SIGNIFICANT CHANGE UP (ref 1.4–6.5)
NEUTROPHILS # BLD AUTO: 4.21 K/UL — SIGNIFICANT CHANGE UP (ref 1.4–6.5)
NEUTROPHILS NFR BLD AUTO: 64.3 % — SIGNIFICANT CHANGE UP (ref 42.2–75.2)
NEUTROPHILS NFR BLD AUTO: 64.3 % — SIGNIFICANT CHANGE UP (ref 42.2–75.2)
NRBC # BLD: 0 /100 WBCS — SIGNIFICANT CHANGE UP (ref 0–0)
NRBC # BLD: 0 /100 WBCS — SIGNIFICANT CHANGE UP (ref 0–0)
PLATELET # BLD AUTO: 229 K/UL — SIGNIFICANT CHANGE UP (ref 130–400)
PLATELET # BLD AUTO: 229 K/UL — SIGNIFICANT CHANGE UP (ref 130–400)
PMV BLD: 9.6 FL — SIGNIFICANT CHANGE UP (ref 7.4–10.4)
PMV BLD: 9.6 FL — SIGNIFICANT CHANGE UP (ref 7.4–10.4)
POTASSIUM SERPL-MCNC: 5.2 MMOL/L — HIGH (ref 3.5–5)
POTASSIUM SERPL-MCNC: 5.2 MMOL/L — HIGH (ref 3.5–5)
POTASSIUM SERPL-SCNC: 5.2 MMOL/L — HIGH (ref 3.5–5)
POTASSIUM SERPL-SCNC: 5.2 MMOL/L — HIGH (ref 3.5–5)
PROT SERPL-MCNC: 6 G/DL — SIGNIFICANT CHANGE UP (ref 6–8)
PROT SERPL-MCNC: 6 G/DL — SIGNIFICANT CHANGE UP (ref 6–8)
RBC # BLD: 3.76 M/UL — LOW (ref 4.7–6.1)
RBC # BLD: 3.76 M/UL — LOW (ref 4.7–6.1)
RBC # FLD: 14.1 % — SIGNIFICANT CHANGE UP (ref 11.5–14.5)
RBC # FLD: 14.1 % — SIGNIFICANT CHANGE UP (ref 11.5–14.5)
SODIUM SERPL-SCNC: 147 MMOL/L — HIGH (ref 135–146)
SODIUM SERPL-SCNC: 147 MMOL/L — HIGH (ref 135–146)
WBC # BLD: 6.54 K/UL — SIGNIFICANT CHANGE UP (ref 4.8–10.8)
WBC # BLD: 6.54 K/UL — SIGNIFICANT CHANGE UP (ref 4.8–10.8)
WBC # FLD AUTO: 6.54 K/UL — SIGNIFICANT CHANGE UP (ref 4.8–10.8)
WBC # FLD AUTO: 6.54 K/UL — SIGNIFICANT CHANGE UP (ref 4.8–10.8)

## 2023-12-31 RX ORDER — DAPAGLIFLOZIN 10 MG/1
1 TABLET, FILM COATED ORAL
Qty: 0 | Refills: 0 | DISCHARGE
Start: 2023-12-31

## 2023-12-31 RX ORDER — FLUTICASONE FUROATE, UMECLIDINIUM BROMIDE AND VILANTEROL TRIFENATATE 200; 62.5; 25 UG/1; UG/1; UG/1
1 POWDER RESPIRATORY (INHALATION)
Qty: 1 | Refills: 4
Start: 2023-12-31 | End: 2024-05-28

## 2023-12-31 RX ADMIN — Medication 20 MILLIGRAM(S): at 13:34

## 2023-12-31 RX ADMIN — Medication 3 MILLILITER(S): at 12:42

## 2023-12-31 RX ADMIN — Medication 40 MILLIGRAM(S): at 06:45

## 2023-12-31 RX ADMIN — Medication 3 MILLILITER(S): at 09:42

## 2023-12-31 RX ADMIN — Medication 600 MILLIGRAM(S): at 06:45

## 2023-12-31 RX ADMIN — ATORVASTATIN CALCIUM 80 MILLIGRAM(S): 80 TABLET, FILM COATED ORAL at 22:05

## 2023-12-31 RX ADMIN — PANTOPRAZOLE SODIUM 40 MILLIGRAM(S): 20 TABLET, DELAYED RELEASE ORAL at 06:45

## 2023-12-31 RX ADMIN — Medication 1 PATCH: at 18:01

## 2023-12-31 RX ADMIN — TIOTROPIUM BROMIDE 2 PUFF(S): 18 CAPSULE ORAL; RESPIRATORY (INHALATION) at 13:42

## 2023-12-31 RX ADMIN — RIVAROXABAN 20 MILLIGRAM(S): KIT at 17:07

## 2023-12-31 RX ADMIN — Medication 100 MILLIGRAM(S): at 06:44

## 2023-12-31 RX ADMIN — BUDESONIDE AND FORMOTEROL FUMARATE DIHYDRATE 2 PUFF(S): 160; 4.5 AEROSOL RESPIRATORY (INHALATION) at 22:09

## 2023-12-31 RX ADMIN — Medication 60 MILLIGRAM(S): at 06:45

## 2023-12-31 RX ADMIN — AZITHROMYCIN 255 MILLIGRAM(S): 500 TABLET, FILM COATED ORAL at 22:04

## 2023-12-31 RX ADMIN — Medication 1 PATCH: at 13:36

## 2023-12-31 RX ADMIN — Medication 600 MILLIGRAM(S): at 17:07

## 2023-12-31 RX ADMIN — Medication 3 MILLILITER(S): at 17:25

## 2023-12-31 RX ADMIN — Medication 1 PATCH: at 13:34

## 2023-12-31 RX ADMIN — VALSARTAN 320 MILLIGRAM(S): 80 TABLET ORAL at 06:44

## 2023-12-31 RX ADMIN — Medication 1 PATCH: at 07:38

## 2023-12-31 RX ADMIN — Medication 3 MILLILITER(S): at 20:15

## 2023-12-31 NOTE — DISCHARGE NOTE PROVIDER - NSDCFUSCHEDAPPT_GEN_ALL_CORE_FT
Harjit Chow  [facility] PreAdmits  Scheduled Appointment: 01/15/2024    [facility] [address] [place]   Scheduled Appointment: 01/15/2024     Harjit Chow  [facility] PreAdmits  Scheduled Appointment: 01/15/2024    [facility] [address] [place]   Scheduled Appointment: 01/15/2024

## 2023-12-31 NOTE — DISCHARGE NOTE PROVIDER - HOSPITAL COURSE
59 yo man active smoke, known COPD on BiPAP at night, hx of Rrt DVT, factor V Leiden,  HTN, DL, and hx of AVR (Bioprosthetic , 2017) presenting for confusion and slurred speech which was noticed by his family.  He admits to feeling worsening shortness of breath with increasing cough and sputum associated with worsening lower ext swelling, orthopnea and PND over past 3-4 days. Patient tis not compliant with BiPAP, lasix  or inhalers. Denies purulent sputum, sick contacts, fever, chills, chest pain/    In ED he was found to be hypoxemic and started on O2 then switched to BiPAP after he developed Respiratory acidosis.Work up showed pulmonary congestion, elevated Pro-BNP  CTA showed no PE.  He was given lasix and admitted for further management      59 yo man active smoke, known COPD, hx of Rrt DVT, factor V Leiden,  HTN, DL, and hx of AVR (Bioprosthetic , 2017) presenting Hypoxic, hyupercapnic respiratory failure 2/2 AECOPD and Decompensated HFpEF    #AMS on admission  -resolved  -CTH and MRI neg  -likely 2/2 to CO2 retention  -check ammonia level , thiamine  -Folate and B12 wnl . No further Neurological testing needed      #COPD exacerbation  -Pulm consulted  - Start on symbicort 160mcg 2 puffs BID and spiriva 2.5 mcg 2 puffs qd, Trelegy on d/c   -s/p solumedrol IV 60mg qd, taper to prednisone PO 40mg qd for 5 days decrease to 20mg for another 5 days for a total of 10 days  - follow-up with pulmonary after discharge for an incidental pulmonary nodule, as well as ongoing LDCT screening.  - Dc ABx  - O2 to target SpO2 89-92%  - BiPAP night   -Fu RVP  -room air    #HFpEF  #AVR  #HTN  - Pro-BNP 4561> downtrending  - ECG and trops unremarkable  - c/w lasix 40mg daily  - c/w metoprolol  - c/w valsartan - Hctz and nifedipine  -Check echo and start farxiga per cardio    #hx Rt DVT/factor v leiden on rivaroxaban    Pt is stable for DC 61 yo man active smoke, known COPD on BiPAP at night, hx of Rrt DVT, factor V Leiden,  HTN, DL, and hx of AVR (Bioprosthetic , 2017) presenting for confusion and slurred speech which was noticed by his family.  He admits to feeling worsening shortness of breath with increasing cough and sputum associated with worsening lower ext swelling, orthopnea and PND over past 3-4 days. Patient tis not compliant with BiPAP, lasix  or inhalers. Denies purulent sputum, sick contacts, fever, chills, chest pain/    In ED he was found to be hypoxemic and started on O2 then switched to BiPAP after he developed Respiratory acidosis.Work up showed pulmonary congestion, elevated Pro-BNP  CTA showed no PE.  He was given lasix and admitted for further management      61 yo man active smoke, known COPD, hx of Rrt DVT, factor V Leiden,  HTN, DL, and hx of AVR (Bioprosthetic , 2017) presenting Hypoxic, hyupercapnic respiratory failure 2/2 AECOPD and Decompensated HFpEF    #AMS on admission  -resolved  -CTH and MRI neg  -likely 2/2 to CO2 retention  -check ammonia level , thiamine  -Folate and B12 wnl . No further Neurological testing needed      #COPD exacerbation  -Pulm consulted  - Start on symbicort 160mcg 2 puffs BID and spiriva 2.5 mcg 2 puffs qd, Trelegy on d/c   -s/p solumedrol IV 60mg qd, taper to prednisone PO 40mg qd for 5 days decrease to 20mg for another 5 days for a total of 10 days  - follow-up with pulmonary after discharge for an incidental pulmonary nodule, as well as ongoing LDCT screening.  - Dc ABx  - O2 to target SpO2 89-92%  - BiPAP night   -Fu RVP  -room air    #HFpEF  #AVR  #HTN  - Pro-BNP 4561> downtrending  - ECG and trops unremarkable  - c/w lasix 40mg daily  - c/w metoprolol  - c/w valsartan - Hctz and nifedipine  -Check echo and start farxiga per cardio    #hx Rt DVT/factor v leiden on rivaroxaban    Pt is stable for DC 59 yo man active smoke, known COPD on BiPAP at night, hx of Rrt DVT, factor V Leiden,  HTN, DL, and hx of AVR (Bioprosthetic , 2017) presenting for confusion and slurred speech which was noticed by his family.  He admits to feeling worsening shortness of breath with increasing cough and sputum associated with worsening lower ext swelling, orthopnea and PND over past 3-4 days. Patient tis not compliant with BiPAP, lasix  or inhalers. Denies purulent sputum, sick contacts, fever, chills, chest pain/    In ED he was found to be hypoxemic and started on O2 then switched to BiPAP after he developed Respiratory acidosis.Work up showed pulmonary congestion, elevated Pro-BNP  CTA showed no PE.  He was given lasix and admitted for further management      59 yo man active smoke, known COPD, hx of Rrt DVT, factor V Leiden,  HTN, DL, and hx of AVR (Bioprosthetic , 2017) presenting Hypoxic, hyupercapnic respiratory failure 2/2 AECOPD and Decompensated HFpEF    #AMS on admission  -resolved  -CTH and MRI neg  -likely 2/2 to CO2 retention  -check ammonia level , thiamine  -Folate and B12 wnl . No further Neurological testing needed      #COPD exacerbation  -Pulm consulted  - Start on symbicort 160mcg 2 puffs BID and spiriva 2.5 mcg 2 puffs qd, Trelegy on d/c   -s/p solumedrol IV 60mg qd, taper to prednisone PO 40mg qd for 5 days decrease to 20mg for another 5 days for a total of 10 days  - follow-up with pulmonary after discharge for an incidental pulmonary nodule, as well as ongoing LDCT screening.  - Dc ABx  - O2 to target SpO2 89-92%  - BiPAP night   -Fu RVP  -3L NC    #HFpEF  #AVR  #HTN  - Pro-BNP 4561> downtrending  - ECG and trops unremarkable  - c/w lasix 40mg daily  - c/w metoprolol  - c/w valsartan - Hctz and nifedipine  -Check echo and start farxiga per cardio    #hx Rt DVT/factor v leiden on rivaroxaban    Pt is stable for DC 61 yo man active smoke, known COPD on BiPAP at night, hx of Rrt DVT, factor V Leiden,  HTN, DL, and hx of AVR (Bioprosthetic , 2017) presenting for confusion and slurred speech which was noticed by his family.  He admits to feeling worsening shortness of breath with increasing cough and sputum associated with worsening lower ext swelling, orthopnea and PND over past 3-4 days. Patient tis not compliant with BiPAP, lasix  or inhalers. Denies purulent sputum, sick contacts, fever, chills, chest pain/    In ED he was found to be hypoxemic and started on O2 then switched to BiPAP after he developed Respiratory acidosis.Work up showed pulmonary congestion, elevated Pro-BNP  CTA showed no PE.  He was given lasix and admitted for further management      61 yo man active smoke, known COPD, hx of Rrt DVT, factor V Leiden,  HTN, DL, and hx of AVR (Bioprosthetic , 2017) presenting Hypoxic, hyupercapnic respiratory failure 2/2 AECOPD and Decompensated HFpEF    #AMS on admission  -resolved  -CTH and MRI neg  -likely 2/2 to CO2 retention  -check ammonia level , thiamine  -Folate and B12 wnl . No further Neurological testing needed      #COPD exacerbation  -Pulm consulted  - Start on symbicort 160mcg 2 puffs BID and spiriva 2.5 mcg 2 puffs qd, Trelegy on d/c   -s/p solumedrol IV 60mg qd, taper to prednisone PO 40mg qd for 5 days decrease to 20mg for another 5 days for a total of 10 days  - follow-up with pulmonary after discharge for an incidental pulmonary nodule, as well as ongoing LDCT screening.  - Dc ABx  - O2 to target SpO2 89-92%  - BiPAP night   -Fu RVP  -3L NC    #HFpEF  #AVR  #HTN  - Pro-BNP 4561> downtrending  - ECG and trops unremarkable  - c/w lasix 40mg daily  - c/w metoprolol  - c/w valsartan - Hctz and nifedipine  -Check echo and start farxiga per cardio    #hx Rt DVT/factor v leiden on rivaroxaban    Pt is stable for DC

## 2023-12-31 NOTE — PROGRESS NOTE ADULT - SUBJECTIVE AND OBJECTIVE BOX
Chart reviewed, patient examined. Pertinent results reviewed.  Case discussed with HO; specialist f/u reviewed  HD#4; much improved since admission.    Patient is a 68y old Male who presents with a chief complaint of SOB (28 Dec 2023 09:00)    Primary diagnosis of COPD with respiratory distress, acute; Encephalopathy 2/2 hypoxia.  All due to URi/ COPD exacerbation.    HPI:  59 yo man active smoke, known COPD on BiPAP at night, hx of Rrt DVT, factor V Leiden,  HTN, DL, and hx of AVR (Bioprosthetic , 2017) presenting for confusion and slurred speech which was noticed by his family.  He admits to feeling worsening shortness of breath with increasing cough and sputum associated with worsening lower ext swelling, orthopnea and PND over past 3-4 days. Patient tis not compliant with BiPAP, lasix  or inhalers. Denies purulent sputum, sick contacts, fever, chills, chest pain/    In ED he was found to be hypoxemic and started on O2 then switched to BiPAP after he developed Respiratory acidosis.Work up showed pulmonary congestion, elevated Pro-BNP  CTA showed no PE.  He was given lasix and admitted for further management       This morning patient was seen and examined at bedside, resting comfortably in bed.   He is feeling significantly better than PTA. No nightmares, hallucinations or slurring.  No acute or major events overnight. He used BIPAP overnight. Slept well. Wife is at bedside-        REVIEW OF SYSTEMS:  CONSTITUTIONAL: No fever, weight loss, or fatigue; poor sleep- had recent nightmares; cleared on O2  EYES: No eye pain, visual disturbances, or discharge  ENMT:  No difficulty hearing, tinnitus, vertigo; No sinus or throat pain  NECK: No pain or stiffness  RESPIRATORY: + cough,+ wheezing,+ chills   shortness of breath+; better, still coughing. w grey sputum  CARDIOVASCULAR: No chest pain, palpitations, dizziness, + leg swelling- better w diuretic and Rx for DVT  GASTROINTESTINAL: No abdominal or epigastric pain. No nausea, vomiting, or hematemesis; No diarrhea or constipation. No melena or hematochezia.  GENITOURINARY: No dysuria, frequency, hematuria, or incontinence; + nocturia  MUSCULOSKELETAL: No joint pain or swelling; No muscle, back, or extremity pain  PSYCHIATRIC: No depression, anxiety, mood swings, or difficulty sleeping  HEME/LYMPH: No easy bruising, or bleeding gums  ALLERY AND IMMUNOLOGIC: No hives or eczema  NEURO: dysarthria felt foggy; ? hallucinations- had PTA; says + nightmares; no HA        Code Status: Full    PAST MEDICAL & SURGICAL HISTORY  HTN (hypertension)    Dyslipidemia    Factor V Leiden    DVT, lower extremity-unprovoked 4 mos ago- on AC    COPD, moderate    S/P AVR (aortic valve replacement)      SOCIAL HISTORY:  Social History: Still smokes 1/2 PPD  Retired [employer] w [employer]-9/11 exposure and F/U      ALLERGIES:  No Known Allergies    MEDICATIONS:  MEDICATIONS  (STANDING):  albuterol/ipratropium for Nebulization 3 milliLiter(s) Nebulizer every 4 hours  atorvastatin 80 milliGRAM(s) Oral at bedtime  azithromycin  IVPB 500 milliGRAM(s) IV Intermittent every 24 hours  budesonide 160 MICROgram(s)/formoterol 4.5 MICROgram(s) Inhaler 2 Puff(s) Inhalation two times a day  budesonide 160 MICROgram(s)/formoterol 4.5 MICROgram(s) Inhaler 2 Puff(s) Inhalation two times a day  dapagliflozin 10 milliGRAM(s) Oral every 24 hours  furosemide    Tablet 40 milliGRAM(s) Oral daily  guaiFENesin  milliGRAM(s) Oral every 12 hours  hydrochlorothiazide 12.5 milliGRAM(s) Oral daily  metoprolol succinate  milliGRAM(s) Oral daily  nicotine -  14 mG/24Hr(s) Patch 1 Patch Transdermal daily  NIFEdipine XL 60 milliGRAM(s) Oral daily  pantoprazole    Tablet 40 milliGRAM(s) Oral before breakfast  predniSONE   Tablet 40 milliGRAM(s) Oral daily  predniSONE   Tablet 20 milliGRAM(s) Oral once  rivaroxaban 20 milliGRAM(s) Oral with dinner  tiotropium 2.5 MICROgram(s) Inhaler 2 Puff(s) Inhalation daily  tiotropium 2.5 MICROgram(s) Inhaler 2 Puff(s) Inhalation daily  valsartan 320 milliGRAM(s) Oral daily    MEDICATIONS  (PRN):    VITALS:   Vital Signs Last 24 Hrs  T(C): 35.7 (31 Dec 2023 07:22), Max: 36.2 (30 Dec 2023 15:55)  T(F): 96.3 (31 Dec 2023 07:22), Max: 97.2 (30 Dec 2023 15:55)  HR: 82 (31 Dec 2023 07:22) (82 - 87)  BP: 169/79 (31 Dec 2023 07:22) (117/64 - 169/79)  BP(mean): --  RR: 18 (31 Dec 2023 00:00) (18 - 18)  SpO2: 91% (31 Dec 2023 07:22) (91% - 98%)    Parameters below as of 31 Dec 2023 07:22  Patient On (Oxygen Delivery Method): nasal cannula            PHYSICAL EXAM:  GENERAL: NAD, lying in bed- 4l/m O2 by NC; AAOx4; occasional coughing - grey sputum.  HEAD:  Atraumatic, Normocephalic  EYES: EOMI, PERRLA, conjunctiva and sclera clear  ENMT: No tonsillar erythema, exudates, or enlargement; 3/4 airway  NECK: Supple, No JVD, Normal thyroid  NERVOUS SYSTEM:  Alert & Oriented X3, Good concentration;   CHEST/LUNG: DECreased BS; occ exp wheeze R>L  HEART: scar; RRR; No murmurs, rubs, or gallops  ABDOMEN: Soft, Nontender, Nondistended; Bowel sounds present  EXTREMITIES:  , No clubbing, cyanosis, or edema  LYMPH: No lymphadenopathy noted  SKIN: No rashes or lesions    LABS:                                12.5   8.16  )-----------( 249      ( 30 Dec 2023 06:40 )             39.7                 12.7   8.68  )-----------( 262      ( 29 Dec 2023 06:06 )             39.9     12-31    147<H>  |  106  |  27<H>  ----------------------------<  109<H>  5.2<H>   |  37<H>  |  1.1    Ca    8.5      31 Dec 2023 06:29  Mg     2.5     12-31    TPro  6.0  /  Alb  3.2<L>  /  TBili  0.2  /  DBili  x   /  AST  18  /  ALT  23  /  AlkPhos  106  12-31    12-30    147<H>  |  106  |  37<H>  ----------------------------<  107<H>  5.5<H>   |  33<H>  |  1.2    Ca    8.6      30 Dec 2023 06:40  Mg     2.5     12-30    TPro  6.2  /  Alb  3.1<L>  /  TBili  <0.2  /  DBili  x   /  AST  21  /  ALT  22  /  AlkPhos  106  12-30 12-29    143  |  102  |  37<H>  ----------------------------<  120<H>  5.0   |  31  |  1.2    Ca    8.7      29 Dec 2023 06:06  Mg     2.5     12-29    TPro  6.5  /  Alb  3.3<L>  /  TBili  <0.2  /  DBili  x   /  AST  20  /  ALT  23  /  AlkPhos  124<H>  12-29      Urinalysis Basic - ( 29 Dec 2023 06:06 )    Color: x / Appearance: x / SG: x / pH: x  Gluc: 120 mg/dL / Ketone: x  / Bili: x / Urobili: x   Blood: x / Protein: x / Nitrite: x   Leuk Esterase: x / RBC: x / WBC x   Sq Epi: x / Non Sq Epi: x / Bacteria: x      ABG - ( 28 Dec 2023 03:46 )  pH, Arterial: 7.40  pH, Blood: x     /  pCO2: 55    /  pO2: 55    / HCO3: 34    / Base Excess: 7.5   /  SaO2: 84.1                      RADIOLOGY:   as noted below  I REV'D OP ECHO FROM 10/23:  LVF-OK; EF 55-60%;  LA ENLARGEMENT; +BIOPROSTHETIC AV    < from: MR Head No Cont (12.29.23 @ 15:04) >  ACC: [id number] EXAM:  MR BRAIN   ORDERED BY: TARYN SIERRA     PROCEDURE DATE:  12/29/2023          INTERPRETATION:  CLINICAL INDICATION: Altered mental status.    TECHNIQUE: Multi-planar multi-sequential MR imaging of the brain was   performed without intravenous contrast.    COMPARISON: Correlated with the same day CT head .    FINDINGS:    There is prominence of the sulci, sylvian fissures, and ventricles likely   reflecting mild diffuse parenchymal volume loss.    There are scattered patchyT2/FLAIR hyperintensities in bilateral   periventricular cerebral white matter, consistent with mild chronic   microvascular ischemic changes.    There are small chronic lacunar infarct in the left corona radiata and   left cerebellum.    No acute infarction, intracranial hemorrhage or mass.    There is no evidence of hydrocephalus. There are no extra-axial fluid   collections. The skull base flow voids are present.    The visualized intraorbital contents are normal. Mild mucosal thickening   in the left sphenoid sinus. The mastoid air cells are clear. The   visualized soft tissues and osseous structures appear normal.    IMPRESSION:    No acute intracranial pathology.    Mild chronic microvascular ischemic changes and small chronic lacunar   infarcts.    --- End of Report ---            LOLY VEGA MD; Attending Radiologist  This document has been electronically signed. Dec 29 2023  3:22PM    < end of copied text >  < from: VA Duplex Carotid, Bilat (12.29.23 @ 12:28) >  ******PRELIMINARY REPORT******      ******PRELIMINARY REPORT******         ACC: [id number] EXAM:  CAROTID DUPLEX COMPLETE BILAT   ORDERED BY: TARYN SIERRA     PROCEDURE DATE:  12/29/2023    ******PRELIMINARY REPORT******      ******PRELIMINARY REPORT******           INTERPRETATION:  CLINICAL INFORMATION: 68-year-old male with dizziness    TECHNIQUE: Grayscale, color and spectral Doppler examination of both   carotid arteries was performed.    FINDINGS:    No elevated velocities or abnormal waveforms are encountered.    Peak systolic velocities are as follows:    RIGHT:  PROX CCA = 79 cm/s  DIST CCA = 84 cm/s  PROX ICA = 196 cm/s  DIST ICA = 111 cm/s  ECA = 104 cm/s  ICA/CCA 2.3    LEFT:  PROX CCA = 118 cm/s  DIST CCA = 110 cm/s  PROX ICA = 146 cm/s  DIST ICA = 127 cm/s  ECA = 146 cm/s  ICA/CCA 1.3    Antegrade flow is noted within both vertebral arteries.    IMPRESSION: No significant hemodynamic stenosis of either carotid artery.   40-59% stenosis bilaterally.    Measurement of carotid stenosis is based on velocity parameters that   correlate the residual internal carotid diameter with that of the more   distal vessel in accordance with a method such as the North American   Symptomatic Carotid Endarterectomy Trial (NASCET).        ******PRELIMINARY REPORT******  ****STILL PRELIM.    ******PRELIMINARY     < end of copied text >  < from: CT Head No Cont (12.29.23 @ 03:49) >  ACC: [id number] EXAM:  CT BRAIN   ORDERED BY: CLAUDIA OTT     PROCEDURE DATE:  12/29/2023          INTERPRETATION:  CLINICAL INDICATION: Altered mental status.    Technique: CT of the head was performed without contrast.    Multiple contiguous axial images were acquired from the skullbase to the   vertex without the administration of intravenous contrast.  Coronal and   sagittal reformations were made.    COMPARISON: None    FINDINGS:    There is generalized cortical volume loss with commensurateventricular   dilation. There is no hydrocephalus. .    There is a chronic left caudate head lacunar infarct. There is patchy   periventricular white matter hypodensity. There is no intraparenchymal   hematoma, mass effect or midline shift. No abnormal extra-axial fluid   collections are present.    The calvarium is intact. The visualized intraorbital compartments and   mastoid complexes appear free of acute disease. There is polypoid mucosal   thickening of the left sphenoid sinus. The remainingvisualized paranasal   sinuses are unremarkable.    IMPRESSION:  No acute intracranial pathology. No evidence of midline shift, mass   effect or intracranial hemorrhage.    Mild chronic microvascular type changes as well as a chronic left caudate   lacunar infarct.    --- End of Report ---            JASPREET BAUMAN MD; Attending Radiologist  This document has been electronically signed. Dec 29 2023  9:40A    < end of copied text >  < from: CT Angio Chest PE Protocol w/ IV Cont (12.27.23 @ 21:07) >  ACC: [id number] EXAM:  CT ANGIO CHEST PULM [facility]   ORDERED BY: HOLLY ALY     PROCEDURE DATE:  12/27/2023          INTERPRETATION:  CLINICAL HISTORY / REASON FOR EXAM: Worsening shortness   of breath.    TECHNIQUE: Multislice helical sectionswere obtained from the thoracic   inlet to the lung bases during rapid administration of 75 cc Omnipaque   350 intravenous contrast using a CTA protocol. Thin sections were   reconstructed through the pulmonary vasculature. Sagittal and coronal   reformatted images were acquired, as well as MIP reconstructed images.    COMPARISON: Noncontrast chest CT 12/9/2022    FINDINGS:  Pulmonary embolus: No CT evidence of acute pulmonary embolus.    Lungs/Airways/Pleura: No acute lobar consolidation. No pleural effusion   or pneumothorax. No bronchiectasis. No honeycombing. Small patchy   groundglass density in the right upper lobe (series 4 image 115; series   603 image 140) adjacent to a slightly thickened bronchiole, likely   related to small airwaysinflammation though nonspecific. Bibasilar   subsegmental atelectasis. At the left base atelectasis appears somewhat   more nodular.    Heart/Vascular: Coronary and aortic atherosclerotic calcifications.   Aortic valve replacement. No pericardial effusion    Mediastinum/Lymph nodes: Few right perihilar lymph nodes measuring up to   1.2 cm in short axis, nonspecific, possibly reactive    Visualized upper abdomen: Cholelithiasis    Bones/soft tissues: Sternotomy. Degenerative changes along the vertebral   column. Multiple chronic rib fractures.    IMPRESSION:  1.  No evidence of acute pulmonary embolus.  2.  No acute lobar consolidation.  3.  Small patchy groundglass density in the right upper lobe adjacent to   a slightly thickened bronchiole,nonspecific though likely related to   small airways inflammation.  4.  Few right perihilar lymph nodes, likely reactive.    --- End of Report ---            PURVI LEONE MD; Attending Radiologist  This document has been electronically signed. Dec [id number] 10:50PM    < end of copied text >             Chart reviewed, patient examined. Pertinent results reviewed.  Case discussed with HO; specialist f/u reviewed  HD#4; much improved since admission.    Patient is a 68y old Male who presents with a chief complaint of SOB (28 Dec 2023 09:00)    Primary diagnosis of COPD with respiratory distress, acute; Encephalopathy 2/2 hypoxia.  All due to URi/ COPD exacerbation.    HPI:  59 yo man active smoke, known COPD on BiPAP at night, hx of Rrt DVT, factor V Leiden,  HTN, DL, and hx of AVR (Bioprosthetic , 2017) presenting for confusion and slurred speech which was noticed by his family.  He admits to feeling worsening shortness of breath with increasing cough and sputum associated with worsening lower ext swelling, orthopnea and PND over past 3-4 days. Patient tis not compliant with BiPAP, lasix  or inhalers. Denies purulent sputum, sick contacts, fever, chills, chest pain/    In ED he was found to be hypoxemic and started on O2 then switched to BiPAP after he developed Respiratory acidosis.Work up showed pulmonary congestion, elevated Pro-BNP  CTA showed no PE.  He was given lasix and admitted for further management       This morning patient was seen and examined at bedside, resting comfortably in bed.   He is feeling significantly better than PTA. No nightmares, hallucinations or slurring.  No acute or major events overnight. He used BIPAP overnight. Slept well. Wife is at bedside-        REVIEW OF SYSTEMS:  CONSTITUTIONAL: No fever, weight loss, or fatigue; poor sleep- had recent nightmares; cleared on O2  EYES: No eye pain, visual disturbances, or discharge  ENMT:  No difficulty hearing, tinnitus, vertigo; No sinus or throat pain  NECK: No pain or stiffness  RESPIRATORY: + cough,+ wheezing,+ chills   shortness of breath+; better, still coughing. w grey sputum  CARDIOVASCULAR: No chest pain, palpitations, dizziness, + leg swelling- better w diuretic and Rx for DVT  GASTROINTESTINAL: No abdominal or epigastric pain. No nausea, vomiting, or hematemesis; No diarrhea or constipation. No melena or hematochezia.  GENITOURINARY: No dysuria, frequency, hematuria, or incontinence; + nocturia  MUSCULOSKELETAL: No joint pain or swelling; No muscle, back, or extremity pain  PSYCHIATRIC: No depression, anxiety, mood swings, or difficulty sleeping  HEME/LYMPH: No easy bruising, or bleeding gums  ALLERY AND IMMUNOLOGIC: No hives or eczema  NEURO: dysarthria felt foggy; ? hallucinations- had PTA; says + nightmares; no HA        Code Status: Full    PAST MEDICAL & SURGICAL HISTORY  HTN (hypertension)    Dyslipidemia    Factor V Leiden    DVT, lower extremity-unprovoked 4 mos ago- on AC    COPD, moderate    S/P AVR (aortic valve replacement)      SOCIAL HISTORY:  Social History: Still smokes 1/2 PPD  Retired [employer] w [employer]-9/11 exposure and F/U      ALLERGIES:  No Known Allergies    MEDICATIONS:  MEDICATIONS  (STANDING):  albuterol/ipratropium for Nebulization 3 milliLiter(s) Nebulizer every 4 hours  atorvastatin 80 milliGRAM(s) Oral at bedtime  azithromycin  IVPB 500 milliGRAM(s) IV Intermittent every 24 hours  budesonide 160 MICROgram(s)/formoterol 4.5 MICROgram(s) Inhaler 2 Puff(s) Inhalation two times a day  budesonide 160 MICROgram(s)/formoterol 4.5 MICROgram(s) Inhaler 2 Puff(s) Inhalation two times a day  dapagliflozin 10 milliGRAM(s) Oral every 24 hours  furosemide    Tablet 40 milliGRAM(s) Oral daily  guaiFENesin  milliGRAM(s) Oral every 12 hours  hydrochlorothiazide 12.5 milliGRAM(s) Oral daily  metoprolol succinate  milliGRAM(s) Oral daily  nicotine -  14 mG/24Hr(s) Patch 1 Patch Transdermal daily  NIFEdipine XL 60 milliGRAM(s) Oral daily  pantoprazole    Tablet 40 milliGRAM(s) Oral before breakfast  predniSONE   Tablet 40 milliGRAM(s) Oral daily  predniSONE   Tablet 20 milliGRAM(s) Oral once  rivaroxaban 20 milliGRAM(s) Oral with dinner  tiotropium 2.5 MICROgram(s) Inhaler 2 Puff(s) Inhalation daily  tiotropium 2.5 MICROgram(s) Inhaler 2 Puff(s) Inhalation daily  valsartan 320 milliGRAM(s) Oral daily    MEDICATIONS  (PRN):    VITALS:   Vital Signs Last 24 Hrs  T(C): 35.7 (31 Dec 2023 07:22), Max: 36.2 (30 Dec 2023 15:55)  T(F): 96.3 (31 Dec 2023 07:22), Max: 97.2 (30 Dec 2023 15:55)  HR: 82 (31 Dec 2023 07:22) (82 - 87)  BP: 169/79 (31 Dec 2023 07:22) (117/64 - 169/79)  BP(mean): --  RR: 18 (31 Dec 2023 00:00) (18 - 18)  SpO2: 91% (31 Dec 2023 07:22) (91% - 98%)    Parameters below as of 31 Dec 2023 07:22  Patient On (Oxygen Delivery Method): nasal cannula            PHYSICAL EXAM:  GENERAL: NAD, lying in bed- 4l/m O2 by NC; AAOx4; occasional coughing - grey sputum.  HEAD:  Atraumatic, Normocephalic  EYES: EOMI, PERRLA, conjunctiva and sclera clear  ENMT: No tonsillar erythema, exudates, or enlargement; 3/4 airway  NECK: Supple, No JVD, Normal thyroid  NERVOUS SYSTEM:  Alert & Oriented X3, Good concentration;   CHEST/LUNG: DECreased BS; occ exp wheeze R>L  HEART: scar; RRR; No murmurs, rubs, or gallops  ABDOMEN: Soft, Nontender, Nondistended; Bowel sounds present  EXTREMITIES:  , No clubbing, cyanosis, or edema  LYMPH: No lymphadenopathy noted  SKIN: No rashes or lesions    LABS:                                12.5   8.16  )-----------( 249      ( 30 Dec 2023 06:40 )             39.7                 12.7   8.68  )-----------( 262      ( 29 Dec 2023 06:06 )             39.9     12-31    147<H>  |  106  |  27<H>  ----------------------------<  109<H>  5.2<H>   |  37<H>  |  1.1    Ca    8.5      31 Dec 2023 06:29  Mg     2.5     12-31    TPro  6.0  /  Alb  3.2<L>  /  TBili  0.2  /  DBili  x   /  AST  18  /  ALT  23  /  AlkPhos  106  12-31    12-30    147<H>  |  106  |  37<H>  ----------------------------<  107<H>  5.5<H>   |  33<H>  |  1.2    Ca    8.6      30 Dec 2023 06:40  Mg     2.5     12-30    TPro  6.2  /  Alb  3.1<L>  /  TBili  <0.2  /  DBili  x   /  AST  21  /  ALT  22  /  AlkPhos  106  12-30 12-29    143  |  102  |  37<H>  ----------------------------<  120<H>  5.0   |  31  |  1.2    Ca    8.7      29 Dec 2023 06:06  Mg     2.5     12-29    TPro  6.5  /  Alb  3.3<L>  /  TBili  <0.2  /  DBili  x   /  AST  20  /  ALT  23  /  AlkPhos  124<H>  12-29      Urinalysis Basic - ( 29 Dec 2023 06:06 )    Color: x / Appearance: x / SG: x / pH: x  Gluc: 120 mg/dL / Ketone: x  / Bili: x / Urobili: x   Blood: x / Protein: x / Nitrite: x   Leuk Esterase: x / RBC: x / WBC x   Sq Epi: x / Non Sq Epi: x / Bacteria: x      ABG - ( 28 Dec 2023 03:46 )  pH, Arterial: 7.40  pH, Blood: x     /  pCO2: 55    /  pO2: 55    / HCO3: 34    / Base Excess: 7.5   /  SaO2: 84.1                      RADIOLOGY:   as noted below  I REV'D OP ECHO FROM 10/23:  LVF-OK; EF 55-60%;  LA ENLARGEMENT; +BIOPROSTHETIC AV    < from: MR Head No Cont (12.29.23 @ 15:04) >  ACC: [id number] EXAM:  MR BRAIN   ORDERED BY: TARYN SIERRA     PROCEDURE DATE:  12/29/2023          INTERPRETATION:  CLINICAL INDICATION: Altered mental status.    TECHNIQUE: Multi-planar multi-sequential MR imaging of the brain was   performed without intravenous contrast.    COMPARISON: Correlated with the same day CT head .    FINDINGS:    There is prominence of the sulci, sylvian fissures, and ventricles likely   reflecting mild diffuse parenchymal volume loss.    There are scattered patchyT2/FLAIR hyperintensities in bilateral   periventricular cerebral white matter, consistent with mild chronic   microvascular ischemic changes.    There are small chronic lacunar infarct in the left corona radiata and   left cerebellum.    No acute infarction, intracranial hemorrhage or mass.    There is no evidence of hydrocephalus. There are no extra-axial fluid   collections. The skull base flow voids are present.    The visualized intraorbital contents are normal. Mild mucosal thickening   in the left sphenoid sinus. The mastoid air cells are clear. The   visualized soft tissues and osseous structures appear normal.    IMPRESSION:    No acute intracranial pathology.    Mild chronic microvascular ischemic changes and small chronic lacunar   infarcts.    --- End of Report ---            LOLY VEGA MD; Attending Radiologist  This document has been electronically signed. Dec 29 2023  3:22PM    < end of copied text >  < from: VA Duplex Carotid, Bilat (12.29.23 @ 12:28) >  ******PRELIMINARY REPORT******      ******PRELIMINARY REPORT******         ACC: [id number] EXAM:  CAROTID DUPLEX COMPLETE BILAT   ORDERED BY: TARYN SIERRA     PROCEDURE DATE:  12/29/2023    ******PRELIMINARY REPORT******      ******PRELIMINARY REPORT******           INTERPRETATION:  CLINICAL INFORMATION: 68-year-old male with dizziness    TECHNIQUE: Grayscale, color and spectral Doppler examination of both   carotid arteries was performed.    FINDINGS:    No elevated velocities or abnormal waveforms are encountered.    Peak systolic velocities are as follows:    RIGHT:  PROX CCA = 79 cm/s  DIST CCA = 84 cm/s  PROX ICA = 196 cm/s  DIST ICA = 111 cm/s  ECA = 104 cm/s  ICA/CCA 2.3    LEFT:  PROX CCA = 118 cm/s  DIST CCA = 110 cm/s  PROX ICA = 146 cm/s  DIST ICA = 127 cm/s  ECA = 146 cm/s  ICA/CCA 1.3    Antegrade flow is noted within both vertebral arteries.    IMPRESSION: No significant hemodynamic stenosis of either carotid artery.   40-59% stenosis bilaterally.    Measurement of carotid stenosis is based on velocity parameters that   correlate the residual internal carotid diameter with that of the more   distal vessel in accordance with a method such as the North American   Symptomatic Carotid Endarterectomy Trial (NASCET).        ******PRELIMINARY REPORT******  ****STILL PRELIM.    ******PRELIMINARY     < end of copied text >  < from: CT Head No Cont (12.29.23 @ 03:49) >  ACC: [id number] EXAM:  CT BRAIN   ORDERED BY: CLAUDIA OTT     PROCEDURE DATE:  12/29/2023          INTERPRETATION:  CLINICAL INDICATION: Altered mental status.    Technique: CT of the head was performed without contrast.    Multiple contiguous axial images were acquired from the skullbase to the   vertex without the administration of intravenous contrast.  Coronal and   sagittal reformations were made.    COMPARISON: None    FINDINGS:    There is generalized cortical volume loss with commensurateventricular   dilation. There is no hydrocephalus. .    There is a chronic left caudate head lacunar infarct. There is patchy   periventricular white matter hypodensity. There is no intraparenchymal   hematoma, mass effect or midline shift. No abnormal extra-axial fluid   collections are present.    The calvarium is intact. The visualized intraorbital compartments and   mastoid complexes appear free of acute disease. There is polypoid mucosal   thickening of the left sphenoid sinus. The remainingvisualized paranasal   sinuses are unremarkable.    IMPRESSION:  No acute intracranial pathology. No evidence of midline shift, mass   effect or intracranial hemorrhage.    Mild chronic microvascular type changes as well as a chronic left caudate   lacunar infarct.    --- End of Report ---            JASPREET BAMUAN MD; Attending Radiologist  This document has been electronically signed. Dec 29 2023  9:40A    < end of copied text >  < from: CT Angio Chest PE Protocol w/ IV Cont (12.27.23 @ 21:07) >  ACC: [id number] EXAM:  CT ANGIO CHEST PULM [facility]   ORDERED BY: HOLLY ALY     PROCEDURE DATE:  12/27/2023          INTERPRETATION:  CLINICAL HISTORY / REASON FOR EXAM: Worsening shortness   of breath.    TECHNIQUE: Multislice helical sectionswere obtained from the thoracic   inlet to the lung bases during rapid administration of 75 cc Omnipaque   350 intravenous contrast using a CTA protocol. Thin sections were   reconstructed through the pulmonary vasculature. Sagittal and coronal   reformatted images were acquired, as well as MIP reconstructed images.    COMPARISON: Noncontrast chest CT 12/9/2022    FINDINGS:  Pulmonary embolus: No CT evidence of acute pulmonary embolus.    Lungs/Airways/Pleura: No acute lobar consolidation. No pleural effusion   or pneumothorax. No bronchiectasis. No honeycombing. Small patchy   groundglass density in the right upper lobe (series 4 image 115; series   603 image 140) adjacent to a slightly thickened bronchiole, likely   related to small airwaysinflammation though nonspecific. Bibasilar   subsegmental atelectasis. At the left base atelectasis appears somewhat   more nodular.    Heart/Vascular: Coronary and aortic atherosclerotic calcifications.   Aortic valve replacement. No pericardial effusion    Mediastinum/Lymph nodes: Few right perihilar lymph nodes measuring up to   1.2 cm in short axis, nonspecific, possibly reactive    Visualized upper abdomen: Cholelithiasis    Bones/soft tissues: Sternotomy. Degenerative changes along the vertebral   column. Multiple chronic rib fractures.    IMPRESSION:  1.  No evidence of acute pulmonary embolus.  2.  No acute lobar consolidation.  3.  Small patchy groundglass density in the right upper lobe adjacent to   a slightly thickened bronchiole,nonspecific though likely related to   small airways inflammation.  4.  Few right perihilar lymph nodes, likely reactive.    --- End of Report ---            PURVI LEONE MD; Attending Radiologist  This document has been electronically signed. Dec 129599 10:50PM    < end of copied text >

## 2023-12-31 NOTE — DISCHARGE NOTE PROVIDER - NSDCCPCAREPLAN_GEN_ALL_CORE_FT
PRINCIPAL DISCHARGE DIAGNOSIS  Diagnosis: COPD with respiratory distress, acute  Assessment and Plan of Treatment: You came in wiht shortness of breath and sputum production.   #COPD exacerbation  -Pulm consulted  - Start on symbicort 160mcg 2 puffs BID and spiriva 2.5 mcg 2 puffs qd, Trelegy on d/c   -s/p solumedrol IV 60mg qd, taper to prednisone PO 40mg qd for 5 days decrease to 20mg for another 5 days for a total of 10 days  - follow-up with pulmonary after discharge for an incidental pulmonary nodule, as well as ongoing LDCT screening.  - Dc ABx  - O2 to target SpO2 89-92%  - BiPAP night   -Fu RVP  -room air        SECONDARY DISCHARGE DIAGNOSES  Diagnosis: CHF exacerbation  Assessment and Plan of Treatment:   #HFpEF  #AVR  #HTN  - Pro-BNP 4561> downtrending  - ECG and trops unremarkable  - c/w lasix 40mg daily  - c/w metoprolol  - c/w valsartan - Hctz and nifedipine  -Check echo and start farxiga per cardio     PRINCIPAL DISCHARGE DIAGNOSIS  Diagnosis: COPD with respiratory distress, acute  Assessment and Plan of Treatment: You came in wiht shortness of breath and sputum production.   #COPD exacerbation  Chronic obstructive pulmonary disease (COPD) is a lung condition in which airflow from the lungs is limited. Causes include smoking, secondhand smoke exposure, genetics, or recurrent infections. Take all medicines (inhaled or pills) as directed by your health care provider. Avoid exposure to irritants such as smoke, chemicals, and fumes that aggravate your breathing.  If you are a smoker, the most important thing that you can do is stop smoking. Continuing to smoke will cause further lung damage and breathing trouble. Ask your health care provider for help with quitting smoking.  follow up with your lung doctor , you will be called for the appointment   take prednisone 40 mg for 2 more days then take prednsione 20 mg for 5 days.   SEEK IMMEDIATE MEDICAL CARE IF YOU HAVE ANY OF THE FOLLOWING SYMPTOMS: shortness of breath at rest or when talking, bluish discoloration of lips, skin, fever, worsening cough, unexplained chest pain, or lightheadedness/dizziness.        SECONDARY DISCHARGE DIAGNOSES  Diagnosis: CHF exacerbation  Assessment and Plan of Treatment: Congestive heart failure (CHF) is a chronic condition in which the heart has trouble pumping blood. In some cases of heart failure, fluid may back up into your lungs or you may have swelling (edema) in your lower legs. There are many causes of heart failure including high blood pressure, coronary artery disease, abnormal heart valves, heart muscle disease, lung disease, diabetes, etc. Symptoms include shortness of breath with activity or when lying flat, cough, swelling of the legs, fatigue, or increased urination during the night.   Treatment is aimed at managing the symptoms of heart failure and may include lifestyle changes, medications, or surgical procedures. Take medicines only as directed by your health care provider and do not stop unless instructed to do so. Eat heart-healthy foods with low or no trans/saturated fats, cholesterol and salt. Weigh yourself every day for early recognition of fluid accumulation.  Please stop taking the lasix though for 3 days becuase your bicarbonate level is higher than normal and repeat the blood work ( basic metabolic panel ) in 3 days and f/u with your  PCP  SEEK IMMEDIATE MEDICAL CARE IF YOU HAVE ANY OF THE FOLLOWING SYMPTOMS: shortness of breath, change in mental status, chest pain, lightheadedness/dizziness/fainting, or worsening of symptoms including not being able to conduct normal physical activity.      Diagnosis: Hypertension  Assessment and Plan of Treatment: your blood pressure was higher than usual , nifedipine dose was increaed to 90 mg, take as prescribed and follow with your PCP     PRINCIPAL DISCHARGE DIAGNOSIS  Diagnosis: COPD with respiratory distress, acute  Assessment and Plan of Treatment: You came in wiht shortness of breath and sputum production.   #COPD exacerbation  Chronic obstructive pulmonary disease (COPD) is a lung condition in which airflow from the lungs is limited. Causes include smoking, secondhand smoke exposure, genetics, or recurrent infections. Take all medicines (inhaled or pills) as directed by your health care provider. Avoid exposure to irritants such as smoke, chemicals, and fumes that aggravate your breathing.  If you are a smoker, the most important thing that you can do is stop smoking. Continuing to smoke will cause further lung damage and breathing trouble. Ask your health care provider for help with quitting smoking.  follow up with your lung doctor , you will be called for the appointment   take prednisone 40 mg for 2 more days then take prednsione 20 mg for 5 days.   you will be discharged on home oxygen   SEEK IMMEDIATE MEDICAL CARE IF YOU HAVE ANY OF THE FOLLOWING SYMPTOMS: shortness of breath at rest or when talking, bluish discoloration of lips, skin, fever, worsening cough, unexplained chest pain, or lightheadedness/dizziness.        SECONDARY DISCHARGE DIAGNOSES  Diagnosis: CHF exacerbation  Assessment and Plan of Treatment: Congestive heart failure (CHF) is a chronic condition in which the heart has trouble pumping blood. In some cases of heart failure, fluid may back up into your lungs or you may have swelling (edema) in your lower legs. There are many causes of heart failure including high blood pressure, coronary artery disease, abnormal heart valves, heart muscle disease, lung disease, diabetes, etc. Symptoms include shortness of breath with activity or when lying flat, cough, swelling of the legs, fatigue, or increased urination during the night.   Treatment is aimed at managing the symptoms of heart failure and may include lifestyle changes, medications, or surgical procedures. Take medicines only as directed by your health care provider and do not stop unless instructed to do so. Eat heart-healthy foods with low or no trans/saturated fats, cholesterol and salt. Weigh yourself every day for early recognition of fluid accumulation.  Please stop taking the lasix though for 3 days becuase your bicarbonate level is higher than normal and repeat the blood work ( basic metabolic panel ) in 3 days and f/u with your  PCP  SEEK IMMEDIATE MEDICAL CARE IF YOU HAVE ANY OF THE FOLLOWING SYMPTOMS: shortness of breath, change in mental status, chest pain, lightheadedness/dizziness/fainting, or worsening of symptoms including not being able to conduct normal physical activity.      Diagnosis: Hypertension  Assessment and Plan of Treatment: your blood pressure was higher than usual , nifedipine dose was increaed to 90 mg, take as prescribed and follow with your PCP

## 2023-12-31 NOTE — PROGRESS NOTE ADULT - ASSESSMENT
59 yo man active smoke, known COPD, hx of Rrt DVT, factor V Leiden,  HTN, DL, and hx of AVR (Bioprosthetic , 2017) presenting Hypoxic, hypercapnic respiratory failure 2/2 AECOPD and Decompensated HFpEF      #AMS on admission  -resolved  -CTH- noted  -MRI HEAD-noted- + old lacuna infarcts. no acute changes;         carotids w mod ob'n 2/2 plaques.  - neuro consult- requested 2nd time- pending  - get carotid duplex please- see  Mod obstruction.  - see neuro eval- no acute CVA;       with lacunae- control RF- BP, lipids, and most important- get patient to stop smoking  - reviewed at bedside w the patient & his wife.    #AHRF  #Acute on chronic respiratory acidosis  #AECOPD  #AMS/CO2 Narcosis - resolved at time of evaluation  - C/w BD  - solumedrol IV 125mg once then 60bid followed by taper;       on qd Prednisone. Will INCrease to 60 mg qd w cont'd wheeze.  - azithromycin 500mg qd  - O2 to target SpO2 89-92%- needs for DC- at this time;      NEEDS O2 ON DC; CM UNABLE TO ARRANGE UNTIL 1/2.  - BiPAP @ hs. + ANIKET also  -See pulm recommendations- please follow ; taper steroids. on DC      #Decompensated HF  #AVR  #HTN  - Pro-BNP elevated  - ECG and trops unremarkable  - c/w lasix 40mg daily  - c/w metoprolol  - c/w valsartan and nifedipine  - hold hctz  - TTE  - TSH  - strict Is and Os   - O2 and BIPAP as needed  - cardio suggests farxiga-OK- started; D3- tolerating    Smoking still  - strongly advised to DC; contributing factor to his multiple problems.  - Nicotine patch now  - again emphasized, w wife at bedside- importance to stop        for his breathing and CVD problems.    ANIKET  - Dx years ago  - non-compliant w machine at home    #hyperkalemia on labs repeat- on an ARB    HTN- fair control; continue to monitor, and consider INCreased meds if High      hx Rt DVT/factor v leiden on rivaroxaban  -ambulate as able in room           61 yo man active smoke, known COPD, hx of Rrt DVT, factor V Leiden,  HTN, DL, and hx of AVR (Bioprosthetic , 2017) presenting Hypoxic, hypercapnic respiratory failure 2/2 AECOPD and Decompensated HFpEF      #AMS on admission  -resolved  -CTH- noted  -MRI HEAD-noted- + old lacuna infarcts. no acute changes;         carotids w mod ob'n 2/2 plaques.  - neuro consult- requested 2nd time- pending  - get carotid duplex please- see  Mod obstruction.  - see neuro eval- no acute CVA;       with lacunae- control RF- BP, lipids, and most important- get patient to stop smoking  - reviewed at bedside w the patient & his wife.    #AHRF  #Acute on chronic respiratory acidosis  #AECOPD  #AMS/CO2 Narcosis - resolved at time of evaluation  - C/w BD  - solumedrol IV 125mg once then 60bid followed by taper;       on qd Prednisone. Will INCrease to 60 mg qd w cont'd wheeze.  - azithromycin 500mg qd  - O2 to target SpO2 89-92%- needs for DC- at this time;      NEEDS O2 ON DC; CM UNABLE TO ARRANGE UNTIL 1/2.  - BiPAP @ hs. + ANIKET also  -See pulm recommendations- please follow ; taper steroids. on DC      #Decompensated HF  #AVR  #HTN  - Pro-BNP elevated  - ECG and trops unremarkable  - c/w lasix 40mg daily  - c/w metoprolol  - c/w valsartan and nifedipine  - hold hctz  - TTE  - TSH  - strict Is and Os   - O2 and BIPAP as needed  - cardio suggests farxiga-OK- started; D3- tolerating    Smoking still  - strongly advised to DC; contributing factor to his multiple problems.  - Nicotine patch now  - again emphasized, w wife at bedside- importance to stop        for his breathing and CVD problems.    ANIKET  - Dx years ago  - non-compliant w machine at home    #hyperkalemia on labs repeat- on an ARB    HTN- fair control; continue to monitor, and consider INCreased meds if High      hx Rt DVT/factor v leiden on rivaroxaban  -ambulate as able in room

## 2023-12-31 NOTE — DISCHARGE NOTE PROVIDER - PROVIDER TOKENS
PROVIDER:[TOKEN:[[id number]],FOLLOWUP:[1-3 days]] PROVIDER:[TOKEN:[[id number]],FOLLOWUP:[1-3 days]] PROVIDER:[TOKEN:[[id number]],FOLLOWUP:[1-3 days]],PROVIDER:[TOKEN:[[id number]],FOLLOWUP:[2 weeks]] PROVIDER:[TOKEN:[[id number]],FOLLOWUP:[1-3 days]],PROVIDER:[TOKEN:[[id number]],FOLLOWUP:[2 weeks]]

## 2023-12-31 NOTE — DISCHARGE NOTE PROVIDER - CARE PROVIDERS DIRECT ADDRESSES
[email or web address],DirectAddress_Unknown

## 2023-12-31 NOTE — DISCHARGE NOTE PROVIDER - CARE PROVIDER_API CALL
Carl Welch  Internal Medicine  [address] [place] [address]  Phone: [phone]  Fax: [phone]  Follow Up Time: 1-3 days   Carl Welch  Internal Medicine  [address] [place] [address]  Phone: [phone]  Fax: [phone]  Follow Up Time: 1-3 days   Carl Welch  Internal Medicine  [address] [place] [address]  Phone: [phone]  Fax: [phone]  Follow Up Time: 1-3 days    Leon Rosenthal  Pulmonary Disease  [address] [place] [address], [facility] [address]  [place] [address]  Phone: [phone]  Fax: [phone]  Follow Up Time: 2 weeks   Carl Welch  Internal Medicine  [address] [place] [address]  Phone: [phone]  Fax: [phone]  Follow Up Time: 1-3 days    Leon Rosenthal  Pulmonary Disease  [address] [place] [address], [facility] [address]  [place] [address]  Phone: [phone]  Fax: [phone]  Follow Up Time: 2 weeks

## 2023-12-31 NOTE — DISCHARGE NOTE PROVIDER - NSDCMRMEDTOKEN_GEN_ALL_CORE_FT
Albuterol (Eqv-ProAir HFA) 90 mcg/inh inhalation aerosol: 2 puff(s) inhaled every 4 hours as needed for  shortness of breath and/or wheezing  atorvastatin 80 mg oral tablet: 1 tab(s) orally once a day  dapagliflozin 10 mg oral tablet: 1 tab(s) orally every 24 hours  furosemide 40 mg oral tablet: 1 tab(s) orally once a day  metoprolol succinate 100 mg oral tablet, extended release: 1 tab(s) orally once a day  Nifedical XL 60 mg oral tablet, extended release: 1 tab(s) orally once a day  omeprazole 40 mg oral delayed release capsule: 1 cap(s) orally once a day  predniSONE 20 mg oral tablet: 2 tab(s) orally once a day Take 3 days of 40mg, then 5 days of 20mg  Trelegy Ellipta 100 mcg-62.5 mcg-25 mcg/inh inhalation powder: 1 puff(s) inhaled once a day  valsartan-hydrochlorothiazide 320 mg-12.5 mg oral tablet: 1 tab(s) orally once a day  Xarelto 20 mg oral tablet: 1 tab(s) orally once a day   Albuterol (Eqv-ProAir HFA) 90 mcg/inh inhalation aerosol: 2 puff(s) inhaled every 4 hours as needed for  shortness of breath and/or wheezing  atorvastatin 80 mg oral tablet: 1 tab(s) orally once a day  dapagliflozin 10 mg oral tablet: 1 tab(s) orally every 24 hours  furosemide 40 mg oral tablet: 1 tab(s) orally once a day  metoprolol succinate 100 mg oral tablet, extended release: 1 tab(s) orally once a day  NIFEdipine 90 mg oral tablet, extended release: 1 tab(s) orally once a day  omeprazole 40 mg oral delayed release capsule: 1 cap(s) orally once a day  predniSONE 20 mg oral tablet: 2 tab(s) orally once a day Take 3 days of 40mg, then 5 days of 20mg  Trelegy Ellipta 100 mcg-62.5 mcg-25 mcg/inh inhalation powder: 1 puff(s) inhaled once a day  valsartan-hydrochlorothiazide 320 mg-12.5 mg oral tablet: 1 tab(s) orally once a day  Xarelto 20 mg oral tablet: 1 tab(s) orally once a day   atorvastatin 80 mg oral tablet: 1 tab(s) orally once a day  dapagliflozin 10 mg oral tablet: 1 tab(s) orally every 24 hours  furosemide 40 mg oral tablet: 1 tab(s) orally once a day  metoprolol succinate 100 mg oral tablet, extended release: 1 tab(s) orally once a day  NIFEdipine 90 mg oral tablet, extended release: 1 tab(s) orally once a day  predniSONE 20 mg oral tablet: 2 tab(s) orally once a day Take 3 days of 40mg, then 5 days of 20mg  Trelegy Ellipta 100 mcg-62.5 mcg-25 mcg/inh inhalation powder: 1 puff(s) inhaled once a day  Xarelto 20 mg oral tablet: 1 tab(s) orally once a day

## 2024-01-01 LAB
ALBUMIN SERPL ELPH-MCNC: 3.2 G/DL — LOW (ref 3.5–5.2)
ALBUMIN SERPL ELPH-MCNC: 3.2 G/DL — LOW (ref 3.5–5.2)
ALP SERPL-CCNC: 103 U/L — SIGNIFICANT CHANGE UP (ref 30–115)
ALP SERPL-CCNC: 103 U/L — SIGNIFICANT CHANGE UP (ref 30–115)
ALT FLD-CCNC: 28 U/L — SIGNIFICANT CHANGE UP (ref 0–41)
ALT FLD-CCNC: 28 U/L — SIGNIFICANT CHANGE UP (ref 0–41)
ANION GAP SERPL CALC-SCNC: 10 MMOL/L — SIGNIFICANT CHANGE UP (ref 7–14)
ANION GAP SERPL CALC-SCNC: 10 MMOL/L — SIGNIFICANT CHANGE UP (ref 7–14)
AST SERPL-CCNC: 21 U/L — SIGNIFICANT CHANGE UP (ref 0–41)
AST SERPL-CCNC: 21 U/L — SIGNIFICANT CHANGE UP (ref 0–41)
BASOPHILS # BLD AUTO: 0.04 K/UL — SIGNIFICANT CHANGE UP (ref 0–0.2)
BASOPHILS # BLD AUTO: 0.04 K/UL — SIGNIFICANT CHANGE UP (ref 0–0.2)
BASOPHILS NFR BLD AUTO: 0.5 % — SIGNIFICANT CHANGE UP (ref 0–1)
BASOPHILS NFR BLD AUTO: 0.5 % — SIGNIFICANT CHANGE UP (ref 0–1)
BILIRUB SERPL-MCNC: 0.2 MG/DL — SIGNIFICANT CHANGE UP (ref 0.2–1.2)
BILIRUB SERPL-MCNC: 0.2 MG/DL — SIGNIFICANT CHANGE UP (ref 0.2–1.2)
BUN SERPL-MCNC: 21 MG/DL — HIGH (ref 10–20)
BUN SERPL-MCNC: 21 MG/DL — HIGH (ref 10–20)
CALCIUM SERPL-MCNC: 8.2 MG/DL — LOW (ref 8.4–10.5)
CALCIUM SERPL-MCNC: 8.2 MG/DL — LOW (ref 8.4–10.5)
CHLORIDE SERPL-SCNC: 104 MMOL/L — SIGNIFICANT CHANGE UP (ref 98–110)
CHLORIDE SERPL-SCNC: 104 MMOL/L — SIGNIFICANT CHANGE UP (ref 98–110)
CO2 SERPL-SCNC: 31 MMOL/L — SIGNIFICANT CHANGE UP (ref 17–32)
CO2 SERPL-SCNC: 31 MMOL/L — SIGNIFICANT CHANGE UP (ref 17–32)
CREAT SERPL-MCNC: 0.9 MG/DL — SIGNIFICANT CHANGE UP (ref 0.7–1.5)
CREAT SERPL-MCNC: 0.9 MG/DL — SIGNIFICANT CHANGE UP (ref 0.7–1.5)
EGFR: 93 ML/MIN/1.73M2 — SIGNIFICANT CHANGE UP
EGFR: 93 ML/MIN/1.73M2 — SIGNIFICANT CHANGE UP
EOSINOPHIL # BLD AUTO: 0.07 K/UL — SIGNIFICANT CHANGE UP (ref 0–0.7)
EOSINOPHIL # BLD AUTO: 0.07 K/UL — SIGNIFICANT CHANGE UP (ref 0–0.7)
EOSINOPHIL NFR BLD AUTO: 1 % — SIGNIFICANT CHANGE UP (ref 0–8)
EOSINOPHIL NFR BLD AUTO: 1 % — SIGNIFICANT CHANGE UP (ref 0–8)
GLUCOSE SERPL-MCNC: 113 MG/DL — HIGH (ref 70–99)
GLUCOSE SERPL-MCNC: 113 MG/DL — HIGH (ref 70–99)
HCT VFR BLD CALC: 39.1 % — LOW (ref 42–52)
HCT VFR BLD CALC: 39.1 % — LOW (ref 42–52)
HGB BLD-MCNC: 12.5 G/DL — LOW (ref 14–18)
HGB BLD-MCNC: 12.5 G/DL — LOW (ref 14–18)
IMM GRANULOCYTES NFR BLD AUTO: 0.4 % — HIGH (ref 0.1–0.3)
IMM GRANULOCYTES NFR BLD AUTO: 0.4 % — HIGH (ref 0.1–0.3)
LYMPHOCYTES # BLD AUTO: 1.49 K/UL — SIGNIFICANT CHANGE UP (ref 1.2–3.4)
LYMPHOCYTES # BLD AUTO: 1.49 K/UL — SIGNIFICANT CHANGE UP (ref 1.2–3.4)
LYMPHOCYTES # BLD AUTO: 20.3 % — LOW (ref 20.5–51.1)
LYMPHOCYTES # BLD AUTO: 20.3 % — LOW (ref 20.5–51.1)
MAGNESIUM SERPL-MCNC: 2.3 MG/DL — SIGNIFICANT CHANGE UP (ref 1.8–2.4)
MAGNESIUM SERPL-MCNC: 2.3 MG/DL — SIGNIFICANT CHANGE UP (ref 1.8–2.4)
MCHC RBC-ENTMCNC: 32 G/DL — SIGNIFICANT CHANGE UP (ref 32–37)
MCHC RBC-ENTMCNC: 32 G/DL — SIGNIFICANT CHANGE UP (ref 32–37)
MCHC RBC-ENTMCNC: 33.2 PG — HIGH (ref 27–31)
MCHC RBC-ENTMCNC: 33.2 PG — HIGH (ref 27–31)
MCV RBC AUTO: 103.7 FL — HIGH (ref 80–94)
MCV RBC AUTO: 103.7 FL — HIGH (ref 80–94)
MONOCYTES # BLD AUTO: 0.69 K/UL — HIGH (ref 0.1–0.6)
MONOCYTES # BLD AUTO: 0.69 K/UL — HIGH (ref 0.1–0.6)
MONOCYTES NFR BLD AUTO: 9.4 % — HIGH (ref 1.7–9.3)
MONOCYTES NFR BLD AUTO: 9.4 % — HIGH (ref 1.7–9.3)
NEUTROPHILS # BLD AUTO: 5.02 K/UL — SIGNIFICANT CHANGE UP (ref 1.4–6.5)
NEUTROPHILS # BLD AUTO: 5.02 K/UL — SIGNIFICANT CHANGE UP (ref 1.4–6.5)
NEUTROPHILS NFR BLD AUTO: 68.4 % — SIGNIFICANT CHANGE UP (ref 42.2–75.2)
NEUTROPHILS NFR BLD AUTO: 68.4 % — SIGNIFICANT CHANGE UP (ref 42.2–75.2)
NRBC # BLD: 0 /100 WBCS — SIGNIFICANT CHANGE UP (ref 0–0)
NRBC # BLD: 0 /100 WBCS — SIGNIFICANT CHANGE UP (ref 0–0)
PLATELET # BLD AUTO: 240 K/UL — SIGNIFICANT CHANGE UP (ref 130–400)
PLATELET # BLD AUTO: 240 K/UL — SIGNIFICANT CHANGE UP (ref 130–400)
PMV BLD: 9.9 FL — SIGNIFICANT CHANGE UP (ref 7.4–10.4)
PMV BLD: 9.9 FL — SIGNIFICANT CHANGE UP (ref 7.4–10.4)
POTASSIUM SERPL-MCNC: 3.9 MMOL/L — SIGNIFICANT CHANGE UP (ref 3.5–5)
POTASSIUM SERPL-MCNC: 3.9 MMOL/L — SIGNIFICANT CHANGE UP (ref 3.5–5)
POTASSIUM SERPL-SCNC: 3.9 MMOL/L — SIGNIFICANT CHANGE UP (ref 3.5–5)
POTASSIUM SERPL-SCNC: 3.9 MMOL/L — SIGNIFICANT CHANGE UP (ref 3.5–5)
PROT SERPL-MCNC: 6 G/DL — SIGNIFICANT CHANGE UP (ref 6–8)
PROT SERPL-MCNC: 6 G/DL — SIGNIFICANT CHANGE UP (ref 6–8)
RBC # BLD: 3.77 M/UL — LOW (ref 4.7–6.1)
RBC # BLD: 3.77 M/UL — LOW (ref 4.7–6.1)
RBC # FLD: 13.7 % — SIGNIFICANT CHANGE UP (ref 11.5–14.5)
RBC # FLD: 13.7 % — SIGNIFICANT CHANGE UP (ref 11.5–14.5)
SODIUM SERPL-SCNC: 145 MMOL/L — SIGNIFICANT CHANGE UP (ref 135–146)
SODIUM SERPL-SCNC: 145 MMOL/L — SIGNIFICANT CHANGE UP (ref 135–146)
WBC # BLD: 7.34 K/UL — SIGNIFICANT CHANGE UP (ref 4.8–10.8)
WBC # BLD: 7.34 K/UL — SIGNIFICANT CHANGE UP (ref 4.8–10.8)
WBC # FLD AUTO: 7.34 K/UL — SIGNIFICANT CHANGE UP (ref 4.8–10.8)
WBC # FLD AUTO: 7.34 K/UL — SIGNIFICANT CHANGE UP (ref 4.8–10.8)

## 2024-01-01 RX ADMIN — BUDESONIDE AND FORMOTEROL FUMARATE DIHYDRATE 2 PUFF(S): 160; 4.5 AEROSOL RESPIRATORY (INHALATION) at 09:10

## 2024-01-01 RX ADMIN — Medication 600 MILLIGRAM(S): at 05:59

## 2024-01-01 RX ADMIN — Medication 600 MILLIGRAM(S): at 17:48

## 2024-01-01 RX ADMIN — Medication 3 MILLILITER(S): at 12:56

## 2024-01-01 RX ADMIN — Medication 40 MILLIGRAM(S): at 05:59

## 2024-01-01 RX ADMIN — Medication 3 MILLILITER(S): at 20:17

## 2024-01-01 RX ADMIN — AZITHROMYCIN 255 MILLIGRAM(S): 500 TABLET, FILM COATED ORAL at 22:48

## 2024-01-01 RX ADMIN — DAPAGLIFLOZIN 10 MILLIGRAM(S): 10 TABLET, FILM COATED ORAL at 22:47

## 2024-01-01 RX ADMIN — Medication 3 MILLILITER(S): at 08:15

## 2024-01-01 RX ADMIN — PANTOPRAZOLE SODIUM 40 MILLIGRAM(S): 20 TABLET, DELAYED RELEASE ORAL at 05:59

## 2024-01-01 RX ADMIN — Medication 3 MILLILITER(S): at 17:09

## 2024-01-01 RX ADMIN — ATORVASTATIN CALCIUM 80 MILLIGRAM(S): 80 TABLET, FILM COATED ORAL at 22:48

## 2024-01-01 RX ADMIN — BUDESONIDE AND FORMOTEROL FUMARATE DIHYDRATE 2 PUFF(S): 160; 4.5 AEROSOL RESPIRATORY (INHALATION) at 22:45

## 2024-01-01 RX ADMIN — Medication 60 MILLIGRAM(S): at 05:59

## 2024-01-01 RX ADMIN — TIOTROPIUM BROMIDE 2 PUFF(S): 18 CAPSULE ORAL; RESPIRATORY (INHALATION) at 12:56

## 2024-01-01 RX ADMIN — Medication 1 PATCH: at 11:41

## 2024-01-01 RX ADMIN — RIVAROXABAN 20 MILLIGRAM(S): KIT at 17:48

## 2024-01-01 RX ADMIN — VALSARTAN 320 MILLIGRAM(S): 80 TABLET ORAL at 06:00

## 2024-01-01 RX ADMIN — Medication 100 MILLIGRAM(S): at 05:59

## 2024-01-01 NOTE — PROGRESS NOTE ADULT - SUBJECTIVE AND OBJECTIVE BOX
24H events:    Patient is a 68y old Male who presents with a chief complaint of SOB (31 Dec 2023 12:15)    Primary diagnosis of COPD with respiratory distress, acute      Today is hospital day 4d. This morning patient was seen and examined at bedside, resting comfortably in bed.     Hemodynamically stable, tolerating oral diet, voiding appropriately with appropriate bowel movements.     Code Status: full code         PAST MEDICAL & SURGICAL HISTORY  HTN (hypertension)    Dyslipidemia    Factor V Leiden    DVT, lower extremity    COPD, moderate    S/P AVR (aortic valve replacement)      SOCIAL HISTORY:  Social History:      ALLERGIES:  No Known Allergies    MEDICATIONS:  STANDING MEDICATIONS  albuterol/ipratropium for Nebulization 3 milliLiter(s) Nebulizer every 4 hours  atorvastatin 80 milliGRAM(s) Oral at bedtime  azithromycin  IVPB 500 milliGRAM(s) IV Intermittent every 24 hours  budesonide 160 MICROgram(s)/formoterol 4.5 MICROgram(s) Inhaler 2 Puff(s) Inhalation two times a day  budesonide 160 MICROgram(s)/formoterol 4.5 MICROgram(s) Inhaler 2 Puff(s) Inhalation two times a day  dapagliflozin 10 milliGRAM(s) Oral every 24 hours  furosemide    Tablet 40 milliGRAM(s) Oral daily  guaiFENesin  milliGRAM(s) Oral every 12 hours  hydrochlorothiazide 12.5 milliGRAM(s) Oral daily  metoprolol succinate  milliGRAM(s) Oral daily  nicotine -  14 mG/24Hr(s) Patch 1 Patch Transdermal daily  NIFEdipine XL 60 milliGRAM(s) Oral daily  pantoprazole    Tablet 40 milliGRAM(s) Oral before breakfast  predniSONE   Tablet 40 milliGRAM(s) Oral daily  rivaroxaban 20 milliGRAM(s) Oral with dinner  tiotropium 2.5 MICROgram(s) Inhaler 2 Puff(s) Inhalation daily  tiotropium 2.5 MICROgram(s) Inhaler 2 Puff(s) Inhalation daily  valsartan 320 milliGRAM(s) Oral daily    PRN MEDICATIONS    VITALS:   T(F): 98.1  HR: 73  BP: 166/74  RR: 18  SpO2: 98%    PHYSICAL EXAM:  GENERAL: NAD  HEAD:  Atraumatic, Normocephalic  EYES: EOMI, PERRLA, conjunctiva and sclera clear  NECK: Supple, No JVD, Normal thyroid  NERVOUS SYSTEM:  Alert & Oriented X3, Good concentration;   CHEST/LUNG: DECreased BS; occ exp wheeze   HEART: RRR, S1S2  ABDOMEN: Soft, Nontender, Nondistended; Bowel sounds present  EXTREMITIES:  , No clubbing, cyanosis, or edema        LABS:                        12.5   7.34  )-----------( 240      ( 01 Jan 2024 06:58 )             39.1     12-31    147<H>  |  106  |  27<H>  ----------------------------<  109<H>  5.2<H>   |  37<H>  |  1.1    Ca    8.5      31 Dec 2023 06:29  Mg     2.5     12-31    TPro  6.0  /  Alb  3.2<L>  /  TBili  0.2  /  DBili  x   /  AST  18  /  ALT  23  /  AlkPhos  106  12-31      Urinalysis Basic - ( 31 Dec 2023 06:29 )    Color: x / Appearance: x / SG: x / pH: x  Gluc: 109 mg/dL / Ketone: x  / Bili: x / Urobili: x   Blood: x / Protein: x / Nitrite: x   Leuk Esterase: x / RBC: x / WBC x   Sq Epi: x / Non Sq Epi: x / Bacteria: x                RADIOLOGY:    RADIOLOGY

## 2024-01-01 NOTE — PROGRESS NOTE ADULT - SUBJECTIVE AND OBJECTIVE BOX
DAVID PETERSON  68y  Male      Patient is a 68y old  Male who presents with a chief complaint of SOB (01 Jan 2024 08:50)        REVIEW OF SYSTEMS:  CONSTITUTIONAL: No fever, weight loss, or fatigue  EYES: No eye pain, visual disturbances, or discharge  ENMT:  No difficulty hearing, tinnitus, vertigo; No sinus or throat pain  NECK: No pain or stiffness  BREASTS: No pain, masses, or nipple discharge  RESPIRATORY: No cough, wheezing, chills or hemoptysis; shortness of breath+  CARDIOVASCULAR: No chest pain, palpitations, dizziness, or leg swelling  GASTROINTESTINAL: No abdominal or epigastric pain. No nausea, vomiting, or hematemesis; No diarrhea or constipation. No melena or hematochezia.  GENITOURINARY: No dysuria, frequency, hematuria, or incontinence  MUSCULOSKELETAL: No joint pain or swelling; No muscle, back, or extremity pain  PSYCHIATRIC: No depression, anxiety, mood swings, or difficulty sleeping  HEME/LYMPH: No easy bruising, or bleeding gums  ALLERY AND IMMUNOLOGIC: No hives or eczema  FAMILY HISTORY:    T(C): 36.7 (01-01-24 @ 08:04), Max: 36.7 (12-31-23 @ 15:47)  HR: 73 (01-01-24 @ 08:04) (68 - 93)  BP: 166/74 (01-01-24 @ 08:04) (131/64 - 166/74)  RR: 18 (01-01-24 @ 08:04) (18 - 18)  SpO2: 98% (01-01-24 @ 08:04) (98% - 98%)  Wt(kg): --Vital Signs Last 24 Hrs  T(C): 36.7 (01 Jan 2024 08:04), Max: 36.7 (31 Dec 2023 15:47)  T(F): 98.1 (01 Jan 2024 08:04), Max: 98.1 (31 Dec 2023 15:47)  HR: 73 (01 Jan 2024 08:04) (68 - 93)  BP: 166/74 (01 Jan 2024 08:04) (131/64 - 166/74)  BP(mean): --  RR: 18 (01 Jan 2024 08:04) (18 - 18)  SpO2: 98% (01 Jan 2024 08:04) (98% - 98%)    Parameters below as of 01 Jan 2024 08:04  Patient On (Oxygen Delivery Method): room air      No Known Allergies      PHYSICAL EXAM:  GENERAL: NAD,   HEAD:  Atraumatic, Normocephalic  EYES: EOMI, PERRLA, conjunctiva and sclera clear  ENMT: No tonsillar erythema, exudates, or enlargement;   NECK: Supple, No JVD, Normal thyroid  NERVOUS SYSTEM:  Alert & Oriented X3,   CHEST/LUNG: VBS bilateral minimal rales+  HEART: Regular rate and rhythm; No murmurs, rubs, or gallops  ABDOMEN: Soft, Nontender, Nondistended; Bowel sounds present  EXTREMITIES:  , No clubbing, cyanosis, or edema  LYMPH: No lymphadenopathy noted  SKIN: No rashes or lesions      LABS:  12-31    147<H>  |  106  |  27<H>  ----------------------------<  109<H>  5.2<H>   |  37<H>  |  1.1    Ca    8.5      31 Dec 2023 06:29  Mg     2.5     12-31    TPro  6.0  /  Alb  3.2<L>  /  TBili  0.2  /  DBili  x   /  AST  18  /  ALT  23  /  AlkPhos  106  12-31                          12.5   7.34  )-----------( 240      ( 01 Jan 2024 06:58 )             39.1         RADIOLOGY & ADDITIONAL TESTS:    MEDICATION:  albuterol/ipratropium for Nebulization 3 milliLiter(s) Nebulizer every 4 hours  atorvastatin 80 milliGRAM(s) Oral at bedtime  azithromycin  IVPB 500 milliGRAM(s) IV Intermittent every 24 hours  budesonide 160 MICROgram(s)/formoterol 4.5 MICROgram(s) Inhaler 2 Puff(s) Inhalation two times a day  budesonide 160 MICROgram(s)/formoterol 4.5 MICROgram(s) Inhaler 2 Puff(s) Inhalation two times a day  dapagliflozin 10 milliGRAM(s) Oral every 24 hours  < from: MR Head No Cont (12.29.23 @ 15:04) >  No acute intracranial pathology.    Mild chronic microvascular ischemic changes and small chronic lacunar   infarcts.    < end of copied text >  < from: VA Duplex Carotid, Bilat (12.29.23 @ 12:28) >  IMPRESSION: No significant hemodynamic stenosis of either carotid artery.   40-59% stenosis bilaterally.    Measurement of carotid stenosis is based on velocity parameters that   correlate the residual internal carotid diameter with that of the more   distal vessel in accordance with a method such as the [employer] American   Symptomatic Carotid Endarterectomy Trial (NASCET).    < end of copied text >  < from: CT Angio Chest PE Protocol w/ IV Cont (12.27.23 @ 21:07) >  IMPRESSION:  1.  No evidence of acute pulmonary embolus.  2.  No acute lobar consolidation.  3.  Small patchy groundglass density in the right upper lobe adjacent to   a slightly thickened bronchiole,nonspecific though likely related to   small airways inflammation.  4.  Few right perihilar lymph nodes, likely reactive.      < end of copied text >  furosemide    Tablet 40 milliGRAM(s) Oral daily  guaiFENesin  milliGRAM(s) Oral every 12 hours  hydrochlorothiazide 12.5 milliGRAM(s) Oral daily  metoprolol succinate  milliGRAM(s) Oral daily  nicotine -  14 mG/24Hr(s) Patch 1 Patch Transdermal daily  NIFEdipine XL 60 milliGRAM(s) Oral daily  pantoprazole    Tablet 40 milliGRAM(s) Oral before breakfast  predniSONE   Tablet 40 milliGRAM(s) Oral daily  rivaroxaban 20 milliGRAM(s) Oral with dinner  tiotropium 2.5 MICROgram(s) Inhaler 2 Puff(s) Inhalation daily  tiotropium 2.5 MICROgram(s) Inhaler 2 Puff(s) Inhalation daily  valsartan 320 milliGRAM(s) Oral daily      HEALTH ISSUES - PROBLEM Dx:  #AMS on admission  -resolved  -CTH- noted  -MRI HEAD-noted- + old lacuna infarcts. no acute changes;         carotids w mod ob'n 2/2 plaques.  - neuro consult- requested 2nd time- pending  - get carotid duplex please- see  Mod obstruction.  - see neuro eval- no acute CVA;       with lacunae- control RF- BP, lipids, and most important- get patient to stop smoking  - reviewed at bedside w the patient & his wife.    #AHRF  #Acute on chronic respiratory acidosis  #AECOPD,active smoker  #AMS/CO2 Narcosis - resolved at time of evaluation  - C/w BD  - solumedrol IV 125mg once then 60bid followed by taper;       on qd Prednisone. Will INCrease to 60 mg qd w cont'd wheeze.  - azithromycin 500mg qd  - O2 to target SpO2 89-92%- needs for DC- at this time;      NEEDS O2 ON DC; CM UNABLE TO ARRANGE UNTIL 1/2.  - BiPAP @ hs. + ANIKET also  -See pulm recommendations- please follow ; taper steroids. on DC      #Decompensated HF  #AVR  #HTN  - Pro-BNP elevated  - ECG and trops unremarkable  - c/w lasix 40mg daily  - c/w metoprolol  - c/w valsartan and nifedipine  - hold hctz  - TTE  - TSH  - strict Is and Os   - O2 and BIPAP as needed  - cardio suggests farxiga-OK- started; D3- tolerating

## 2024-01-02 ENCOUNTER — TRANSCRIPTION ENCOUNTER (OUTPATIENT)
Age: 69
End: 2024-01-02

## 2024-01-02 VITALS
TEMPERATURE: 98 F | HEART RATE: 86 BPM | SYSTOLIC BLOOD PRESSURE: 136 MMHG | DIASTOLIC BLOOD PRESSURE: 61 MMHG | OXYGEN SATURATION: 94 %

## 2024-01-02 LAB
ALBUMIN SERPL ELPH-MCNC: 3.4 G/DL — LOW (ref 3.5–5.2)
ALBUMIN SERPL ELPH-MCNC: 3.4 G/DL — LOW (ref 3.5–5.2)
ALP SERPL-CCNC: 106 U/L — SIGNIFICANT CHANGE UP (ref 30–115)
ALP SERPL-CCNC: 106 U/L — SIGNIFICANT CHANGE UP (ref 30–115)
ALT FLD-CCNC: 28 U/L — SIGNIFICANT CHANGE UP (ref 0–41)
ALT FLD-CCNC: 28 U/L — SIGNIFICANT CHANGE UP (ref 0–41)
ANION GAP SERPL CALC-SCNC: 3 MMOL/L — LOW (ref 7–14)
ANION GAP SERPL CALC-SCNC: 3 MMOL/L — LOW (ref 7–14)
AST SERPL-CCNC: 21 U/L — SIGNIFICANT CHANGE UP (ref 0–41)
AST SERPL-CCNC: 21 U/L — SIGNIFICANT CHANGE UP (ref 0–41)
BASOPHILS # BLD AUTO: 0.06 K/UL — SIGNIFICANT CHANGE UP (ref 0–0.2)
BASOPHILS # BLD AUTO: 0.06 K/UL — SIGNIFICANT CHANGE UP (ref 0–0.2)
BASOPHILS NFR BLD AUTO: 0.9 % — SIGNIFICANT CHANGE UP (ref 0–1)
BASOPHILS NFR BLD AUTO: 0.9 % — SIGNIFICANT CHANGE UP (ref 0–1)
BILIRUB SERPL-MCNC: 0.2 MG/DL — SIGNIFICANT CHANGE UP (ref 0.2–1.2)
BILIRUB SERPL-MCNC: 0.2 MG/DL — SIGNIFICANT CHANGE UP (ref 0.2–1.2)
BUN SERPL-MCNC: 23 MG/DL — HIGH (ref 10–20)
BUN SERPL-MCNC: 23 MG/DL — HIGH (ref 10–20)
CALCIUM SERPL-MCNC: 8.8 MG/DL — SIGNIFICANT CHANGE UP (ref 8.4–10.5)
CALCIUM SERPL-MCNC: 8.8 MG/DL — SIGNIFICANT CHANGE UP (ref 8.4–10.5)
CHLORIDE SERPL-SCNC: 102 MMOL/L — SIGNIFICANT CHANGE UP (ref 98–110)
CHLORIDE SERPL-SCNC: 102 MMOL/L — SIGNIFICANT CHANGE UP (ref 98–110)
CO2 SERPL-SCNC: 37 MMOL/L — HIGH (ref 17–32)
CO2 SERPL-SCNC: 37 MMOL/L — HIGH (ref 17–32)
CREAT SERPL-MCNC: 1.3 MG/DL — SIGNIFICANT CHANGE UP (ref 0.7–1.5)
CREAT SERPL-MCNC: 1.3 MG/DL — SIGNIFICANT CHANGE UP (ref 0.7–1.5)
EGFR: 60 ML/MIN/1.73M2 — SIGNIFICANT CHANGE UP
EGFR: 60 ML/MIN/1.73M2 — SIGNIFICANT CHANGE UP
EOSINOPHIL # BLD AUTO: 0.1 K/UL — SIGNIFICANT CHANGE UP (ref 0–0.7)
EOSINOPHIL # BLD AUTO: 0.1 K/UL — SIGNIFICANT CHANGE UP (ref 0–0.7)
EOSINOPHIL NFR BLD AUTO: 1.5 % — SIGNIFICANT CHANGE UP (ref 0–8)
EOSINOPHIL NFR BLD AUTO: 1.5 % — SIGNIFICANT CHANGE UP (ref 0–8)
GLUCOSE SERPL-MCNC: 116 MG/DL — HIGH (ref 70–99)
GLUCOSE SERPL-MCNC: 116 MG/DL — HIGH (ref 70–99)
HCT VFR BLD CALC: 40.6 % — LOW (ref 42–52)
HCT VFR BLD CALC: 40.6 % — LOW (ref 42–52)
HGB BLD-MCNC: 13.1 G/DL — LOW (ref 14–18)
HGB BLD-MCNC: 13.1 G/DL — LOW (ref 14–18)
IMM GRANULOCYTES NFR BLD AUTO: 0.6 % — HIGH (ref 0.1–0.3)
IMM GRANULOCYTES NFR BLD AUTO: 0.6 % — HIGH (ref 0.1–0.3)
LYMPHOCYTES # BLD AUTO: 1.31 K/UL — SIGNIFICANT CHANGE UP (ref 1.2–3.4)
LYMPHOCYTES # BLD AUTO: 1.31 K/UL — SIGNIFICANT CHANGE UP (ref 1.2–3.4)
LYMPHOCYTES # BLD AUTO: 19.3 % — LOW (ref 20.5–51.1)
LYMPHOCYTES # BLD AUTO: 19.3 % — LOW (ref 20.5–51.1)
MAGNESIUM SERPL-MCNC: 2.4 MG/DL — SIGNIFICANT CHANGE UP (ref 1.8–2.4)
MAGNESIUM SERPL-MCNC: 2.4 MG/DL — SIGNIFICANT CHANGE UP (ref 1.8–2.4)
MCHC RBC-ENTMCNC: 32.3 G/DL — SIGNIFICANT CHANGE UP (ref 32–37)
MCHC RBC-ENTMCNC: 32.3 G/DL — SIGNIFICANT CHANGE UP (ref 32–37)
MCHC RBC-ENTMCNC: 32.8 PG — HIGH (ref 27–31)
MCHC RBC-ENTMCNC: 32.8 PG — HIGH (ref 27–31)
MCV RBC AUTO: 101.8 FL — HIGH (ref 80–94)
MCV RBC AUTO: 101.8 FL — HIGH (ref 80–94)
MONOCYTES # BLD AUTO: 0.45 K/UL — SIGNIFICANT CHANGE UP (ref 0.1–0.6)
MONOCYTES # BLD AUTO: 0.45 K/UL — SIGNIFICANT CHANGE UP (ref 0.1–0.6)
MONOCYTES NFR BLD AUTO: 6.6 % — SIGNIFICANT CHANGE UP (ref 1.7–9.3)
MONOCYTES NFR BLD AUTO: 6.6 % — SIGNIFICANT CHANGE UP (ref 1.7–9.3)
NEUTROPHILS # BLD AUTO: 4.82 K/UL — SIGNIFICANT CHANGE UP (ref 1.4–6.5)
NEUTROPHILS # BLD AUTO: 4.82 K/UL — SIGNIFICANT CHANGE UP (ref 1.4–6.5)
NEUTROPHILS NFR BLD AUTO: 71.1 % — SIGNIFICANT CHANGE UP (ref 42.2–75.2)
NEUTROPHILS NFR BLD AUTO: 71.1 % — SIGNIFICANT CHANGE UP (ref 42.2–75.2)
NRBC # BLD: 0 /100 WBCS — SIGNIFICANT CHANGE UP (ref 0–0)
NRBC # BLD: 0 /100 WBCS — SIGNIFICANT CHANGE UP (ref 0–0)
PLATELET # BLD AUTO: 248 K/UL — SIGNIFICANT CHANGE UP (ref 130–400)
PLATELET # BLD AUTO: 248 K/UL — SIGNIFICANT CHANGE UP (ref 130–400)
PMV BLD: 10.3 FL — SIGNIFICANT CHANGE UP (ref 7.4–10.4)
PMV BLD: 10.3 FL — SIGNIFICANT CHANGE UP (ref 7.4–10.4)
POTASSIUM SERPL-MCNC: 5.4 MMOL/L — HIGH (ref 3.5–5)
POTASSIUM SERPL-MCNC: 5.4 MMOL/L — HIGH (ref 3.5–5)
POTASSIUM SERPL-SCNC: 5.4 MMOL/L — HIGH (ref 3.5–5)
POTASSIUM SERPL-SCNC: 5.4 MMOL/L — HIGH (ref 3.5–5)
PROT SERPL-MCNC: 6.1 G/DL — SIGNIFICANT CHANGE UP (ref 6–8)
PROT SERPL-MCNC: 6.1 G/DL — SIGNIFICANT CHANGE UP (ref 6–8)
RBC # BLD: 3.99 M/UL — LOW (ref 4.7–6.1)
RBC # BLD: 3.99 M/UL — LOW (ref 4.7–6.1)
RBC # FLD: 13.6 % — SIGNIFICANT CHANGE UP (ref 11.5–14.5)
RBC # FLD: 13.6 % — SIGNIFICANT CHANGE UP (ref 11.5–14.5)
SODIUM SERPL-SCNC: 142 MMOL/L — SIGNIFICANT CHANGE UP (ref 135–146)
SODIUM SERPL-SCNC: 142 MMOL/L — SIGNIFICANT CHANGE UP (ref 135–146)
WBC # BLD: 6.78 K/UL — SIGNIFICANT CHANGE UP (ref 4.8–10.8)
WBC # BLD: 6.78 K/UL — SIGNIFICANT CHANGE UP (ref 4.8–10.8)
WBC # FLD AUTO: 6.78 K/UL — SIGNIFICANT CHANGE UP (ref 4.8–10.8)
WBC # FLD AUTO: 6.78 K/UL — SIGNIFICANT CHANGE UP (ref 4.8–10.8)

## 2024-01-02 RX ORDER — SODIUM ZIRCONIUM CYCLOSILICATE 10 G/10G
5 POWDER, FOR SUSPENSION ORAL ONCE
Refills: 0 | Status: COMPLETED | OUTPATIENT
Start: 2024-01-02 | End: 2024-01-02

## 2024-01-02 RX ORDER — NIFEDIPINE 30 MG
1 TABLET, EXTENDED RELEASE 24 HR ORAL
Qty: 30 | Refills: 0
Start: 2024-01-02 | End: 2024-01-31

## 2024-01-02 RX ADMIN — Medication 100 MILLIGRAM(S): at 05:26

## 2024-01-02 RX ADMIN — RIVAROXABAN 20 MILLIGRAM(S): KIT at 17:51

## 2024-01-02 RX ADMIN — SODIUM ZIRCONIUM CYCLOSILICATE 5 GRAM(S): 10 POWDER, FOR SUSPENSION ORAL at 12:06

## 2024-01-02 RX ADMIN — Medication 3 MILLILITER(S): at 13:08

## 2024-01-02 RX ADMIN — VALSARTAN 320 MILLIGRAM(S): 80 TABLET ORAL at 05:26

## 2024-01-02 RX ADMIN — BUDESONIDE AND FORMOTEROL FUMARATE DIHYDRATE 2 PUFF(S): 160; 4.5 AEROSOL RESPIRATORY (INHALATION) at 08:10

## 2024-01-02 RX ADMIN — Medication 60 MILLIGRAM(S): at 05:25

## 2024-01-02 RX ADMIN — Medication 600 MILLIGRAM(S): at 05:24

## 2024-01-02 RX ADMIN — Medication 40 MILLIGRAM(S): at 05:25

## 2024-01-02 RX ADMIN — Medication 600 MILLIGRAM(S): at 17:51

## 2024-01-02 RX ADMIN — Medication 1 PATCH: at 12:06

## 2024-01-02 RX ADMIN — Medication 40 MILLIGRAM(S): at 05:30

## 2024-01-02 RX ADMIN — PANTOPRAZOLE SODIUM 40 MILLIGRAM(S): 20 TABLET, DELAYED RELEASE ORAL at 05:25

## 2024-01-02 NOTE — DISCHARGE NOTE NURSING/CASE MANAGEMENT/SOCIAL WORK - NSDCVIVACCINE_GEN_ALL_CORE_FT
Tdap; 03-Sep-2019 14:09; Dania Gottlieb (RN); [employer]; [id number] (Exp. Date: 04-Jul-2021); IntraMuscular; Deltoid Left.; 0.5 milliLiter(s); VIS (VIS Published: 09-May-2013, VIS Presented: 03-Sep-2019);    Tdap; 03-Sep-2019 14:09; Dania Gottlieb (RN); [employer]; [id number] (Exp. Date: 04-Jul-2021); IntraMuscular; Deltoid Left.; 0.5 milliLiter(s); VIS (VIS Published: 09-May-2013, VIS Presented: 03-Sep-2019);

## 2024-01-02 NOTE — PROGRESS NOTE ADULT - ASSESSMENT
59 yo man active smoke, known COPD, hx of Rrt DVT, factor V Leiden,  HTN, DL, and hx of AVR (Bioprosthetic , 2017) presenting Hypoxic, hypercapnic respiratory failure 2/2 AECOPD and Decompensated HFpEF      #AMS on admission  -resolved  -CTH- noted  -MRI HEAD-noted- + old lacuna infarcts. no acute changes;         carotids w mod ob'n 2/2 plaques.  - neuro consult- requested 2nd time- pending  - get carotid duplex please- see  Mod obstruction.  - see neuro eval- no acute CVA;       with lacunae- control RF- BP, lipids, and most important- get patient to stop smoking  - reviewed at bedside w the patient & his wife.    #AHRF  #Acute on chronic respiratory acidosis  #AECOPD  #AMS/CO2 Narcosis - resolved at time of evaluation  - C/w BD  - solumedrol IV 125mg once then 60bid followed by taper;       on qd Prednisone. 40 mg qd x5, then 20 mg qd x 5 on DC  - azithromycin 500mg qd- continue x 3 days on dc  - O2 to target SpO2 89-92%- needs for DC- at this time;      NEEDS O2 ON DC; CM UNABLE TO ARRANGE UNTIL 1/2.- paperwork complreted and reviewed w CM  - BiPAP @ hs. + ANIKET also---review settings w patient for home machine  -See pulm recommendations- please follow ; taper steroids. on DC   continue all inhalers.      #Decompensated HF  #AVR  #HTN  - Pro-BNP elevated  - ECG and trops unremarkable  - c/w lasix 40mg daily- advised to take at least 4x/wk- patient has been non compliant 2/2 polyuria.  - c/w metoprolol  - c/w valsartan and nifedipine-- INCrease N to 90 qd - see HTN  - hold hctz  - TTE- noted  - TSH  - strict Is and Os   - O2 and BIPAP as needed  - cardio suggests farxiga-OK- started; D4- tolerating--RX on DC to continue- f/u w cardio- dr gregory    Smoking still  - strongly advised to DC; contributing factor to his multiple problems.  - Nicotine patch now  - again emphasized, w wife at bedside- importance to stop        for his breathing and CVD problems.  - has patches at home, and to look into accupunture and hypnosis    ANIKET  - Dx years ago  - non-compliant w machine at home  - see janette    #hyperkalemia on labs repeat- on an ARB    HTN- fair control; continue to monitor, and consider INCreased meds if High      Nefedipine to 90mg on DC      hx Rt DVT/factor v leiden on rivaroxaban- continue.  -ambulate as able in room    Rev'd DC and Meds w HO        61 yo man active smoke, known COPD, hx of Rrt DVT, factor V Leiden,  HTN, DL, and hx of AVR (Bioprosthetic , 2017) presenting Hypoxic, hypercapnic respiratory failure 2/2 AECOPD and Decompensated HFpEF      #AMS on admission  -resolved  -CTH- noted  -MRI HEAD-noted- + old lacuna infarcts. no acute changes;         carotids w mod ob'n 2/2 plaques.  - neuro consult- requested 2nd time- pending  - get carotid duplex please- see  Mod obstruction.  - see neuro eval- no acute CVA;       with lacunae- control RF- BP, lipids, and most important- get patient to stop smoking  - reviewed at bedside w the patient & his wife.    #AHRF  #Acute on chronic respiratory acidosis  #AECOPD  #AMS/CO2 Narcosis - resolved at time of evaluation  - C/w BD  - solumedrol IV 125mg once then 60bid followed by taper;       on qd Prednisone. 40 mg qd x5, then 20 mg qd x 5 on DC  - azithromycin 500mg qd- continue x 3 days on dc  - O2 to target SpO2 89-92%- needs for DC- at this time;      NEEDS O2 ON DC; CM UNABLE TO ARRANGE UNTIL 1/2.- paperwork complreted and reviewed w CM  - BiPAP @ hs. + ANIKET also---review settings w patient for home machine  -See pulm recommendations- please follow ; taper steroids. on DC   continue all inhalers.      #Decompensated HF  #AVR  #HTN  - Pro-BNP elevated  - ECG and trops unremarkable  - c/w lasix 40mg daily- advised to take at least 4x/wk- patient has been non compliant 2/2 polyuria.  - c/w metoprolol  - c/w valsartan and nifedipine-- INCrease N to 90 qd - see HTN  - hold hctz  - TTE- noted  - TSH  - strict Is and Os   - O2 and BIPAP as needed  - cardio suggests farxiga-OK- started; D4- tolerating--RX on DC to continue- f/u w cardio- dr gregory    Smoking still  - strongly advised to DC; contributing factor to his multiple problems.  - Nicotine patch now  - again emphasized, w wife at bedside- importance to stop        for his breathing and CVD problems.  - has patches at home, and to look into accupunture and hypnosis    ANIKET  - Dx years ago  - non-compliant w machine at home  - see janette    #hyperkalemia on labs repeat- on an ARB    HTN- fair control; continue to monitor, and consider INCreased meds if High      Nefedipine to 90mg on DC      hx Rt DVT/factor v leiden on rivaroxaban- continue.  -ambulate as able in room    Rev'd DC and Meds w HO

## 2024-01-02 NOTE — DISCHARGE NOTE NURSING/CASE MANAGEMENT/SOCIAL WORK - NSDCPEWEB_GEN_ALL_CORE
[facility] for Tobacco [facility] website --- [email or web address] website --- [email or web address] for Tobacco Control website --- [email or web address] website --- [email or web address]

## 2024-01-02 NOTE — PROGRESS NOTE ADULT - SUBJECTIVE AND OBJECTIVE BOX
Chart reviewed, patient examined. Pertinent results reviewed.  Case discussed with HO; specialist f/u reviewed  HD#6; much improved since admission.    Patient is a 68y old Male who presents with a chief complaint of SOB (28 Dec 2023 09:00)    Primary diagnosis of COPD with respiratory distress, acute; Encephalopathy 2/2 hypoxia.  All due to URi/ COPD exacerbation.    HPI:  61 yo man active smoke, known COPD on BiPAP at night, hx of Rrt DVT, factor V Leiden,  HTN, DL, and hx of AVR (Bioprosthetic , 2017) presenting for confusion and slurred speech which was noticed by his family.  He admits to feeling worsening shortness of breath with increasing cough and sputum associated with worsening lower ext swelling, orthopnea and PND over past 3-4 days. Patient tis not compliant with BiPAP, lasix  or inhalers. Denies purulent sputum, sick contacts, fever, chills, chest pain/    In ED he was found to be hypoxemic and started on O2 then switched to BiPAP after he developed Respiratory acidosis.Work up showed pulmonary congestion, elevated Pro-BNP  CTA showed no PE.  He was given lasix and admitted for further management       This morning patient was seen and examined at bedside, resting comfortably in bed.   He is feeling significantly better than PTA. No nightmares, hallucinations or slurring.  No acute or major events overnight. He used BIPAP overnight. Slept well. Pulse ox- 92% on RA at rest; with walking- dropped to 87%- I walked w patient. NAD        REVIEW OF SYSTEMS:  CONSTITUTIONAL: No fever, weight loss, or fatigue; poor sleep- had recent nightmares; cleared on O2  EYES: No eye pain, visual disturbances, or discharge  ENMT:  No difficulty hearing, tinnitus, vertigo; No sinus or throat pain  NECK: No pain or stiffness  RESPIRATORY: + cough,+ wheezing,+ chills   shortness of breath+; better, still coughing. w < sputum  CARDIOVASCULAR: No chest pain, palpitations, dizziness, + leg swelling- better w diuretic and Rx for DVT  GASTROINTESTINAL: No abdominal or epigastric pain. No nausea, vomiting, or hematemesis; No diarrhea or constipation. No melena or hematochezia.  GENITOURINARY: No dysuria, frequency, hematuria, or incontinence; + nocturia  MUSCULOSKELETAL: No joint pain or swelling; No muscle, back, or extremity pain  PSYCHIATRIC: No depression, anxiety, mood swings, or difficulty sleeping  HEME/LYMPH: No easy bruising, or bleeding gums  ALLERY AND IMMUNOLOGIC: No hives or eczema  NEURO: dysarthria felt foggy; ? hallucinations- had PTA; says + nightmares; no HA        Code Status: Full    PAST MEDICAL & SURGICAL HISTORY  HTN (hypertension)    Dyslipidemia    Factor V Leiden    DVT, lower extremity-unprovoked 4 mos ago- on AC    COPD, moderate    S/P AVR (aortic valve replacement)  PAD w LE Stent      SOCIAL HISTORY:  Social History: Still smokes 1/2 PPD- PTA  Retired [employer] w [employer]-9/11 exposure and F/U      ALLERGIES:  No Known Allergies    MEDICATIONS:  MEDICATIONS  (STANDING):  albuterol/ipratropium for Nebulization 3 milliLiter(s) Nebulizer every 4 hours  atorvastatin 80 milliGRAM(s) Oral at bedtime  azithromycin  IVPB 500 milliGRAM(s) IV Intermittent every 24 hours  budesonide 160 MICROgram(s)/formoterol 4.5 MICROgram(s) Inhaler 2 Puff(s) Inhalation two times a day  budesonide 160 MICROgram(s)/formoterol 4.5 MICROgram(s) Inhaler 2 Puff(s) Inhalation two times a day  dapagliflozin 10 milliGRAM(s) Oral every 24 hours  furosemide    Tablet 40 milliGRAM(s) Oral daily  guaiFENesin  milliGRAM(s) Oral every 12 hours  hydrochlorothiazide 12.5 milliGRAM(s) Oral daily  metoprolol succinate  milliGRAM(s) Oral daily  nicotine -  14 mG/24Hr(s) Patch 1 Patch Transdermal daily  NIFEdipine XL 60 milliGRAM(s) Oral daily  pantoprazole    Tablet 40 milliGRAM(s) Oral before breakfast  predniSONE   Tablet 40 milliGRAM(s) Oral daily  rivaroxaban 20 milliGRAM(s) Oral with dinner  tiotropium 2.5 MICROgram(s) Inhaler 2 Puff(s) Inhalation daily  tiotropium 2.5 MICROgram(s) Inhaler 2 Puff(s) Inhalation daily  valsartan 320 milliGRAM(s) Oral daily    MEDICATIONS  (PRN):        VITALS:   Vital Signs Last 24 Hrs  T(C): 36.7 (02 Jan 2024 07:55), Max: 37.4 (01 Jan 2024 15:30)  T(F): 98.1 (02 Jan 2024 07:55), Max: 99.3 (01 Jan 2024 15:30)  HR: 72 (02 Jan 2024 07:55) (72 - 85)  BP: 167/82 (02 Jan 2024 07:55) (138/57 - 169/78)  BP(mean): --  RR: 18 (02 Jan 2024 07:55) (18 - 18)  SpO2: 98% (02 Jan 2024 07:55) (94% - 98%)    Parameters below as of 02 Jan 2024 07:55  Patient On (Oxygen Delivery Method): room air  with walking- POx: 87%, HR- 78            PHYSICAL EXAM:  s/p CPAP overnight  GENERAL: NAD, lying in bed- on RA; AAOx4; occasional coughing - grey sputum.  HEAD:  Atraumatic, Normocephalic  EYES: EOMI, PERRLA, conjunctiva and sclera clear  ENMT: No tonsillar erythema, exudates, or enlargement; 3/4 airway  NECK: Supple, No JVD, Normal thyroid  NERVOUS SYSTEM:  Alert & Oriented X3, Good concentration;   CHEST/LUNG: DECreased BS; occ exp wheeze and rhonchi R>L  HEART: scar; RRR; No murmurs, rubs, or gallops  ABDOMEN: Soft, Nontender, Nondistended; Bowel sounds present  EXTREMITIES:  , No clubbing, cyanosis, or edema  LYMPH: No lymphadenopathy noted  SKIN: No rashes or lesions    LABS:                                   13.1   6.78  )-----------( 248      ( 02 Jan 2024 07:11 )             40.6                      12.5   8.16  )-----------( 249      ( 30 Dec 2023 06:40 )             39.7                 12.7   8.68  )-----------( 262      ( 29 Dec 2023 06:06 )             39.9     01-02    142  |  102  |  23<H>  ----------------------------<  116<H>  5.4<H>   |  37<H>  |  1.3    Ca    8.8      02 Jan 2024 07:11  Mg     2.4     01-02    TPro  6.1  /  Alb  3.4<L>  /  TBili  0.2  /  DBili  x   /  AST  21  /  ALT  28  /  AlkPhos  106  01-02 12-31    147<H>  |  106  |  27<H>  ----------------------------<  109<H>  5.2<H>   |  37<H>  |  1.1    Ca    8.5      31 Dec 2023 06:29  Mg     2.5     12-31    TPro  6.0  /  Alb  3.2<L>  /  TBili  0.2  /  DBili  x   /  AST  18  /  ALT  23  /  AlkPhos  106  12-31 12-30    147<H>  |  106  |  37<H>  ----------------------------<  107<H>  5.5<H>   |  33<H>  |  1.2    Ca    8.6      30 Dec 2023 06:40  Mg     2.5     12-30    TPro  6.2  /  Alb  3.1<L>  /  TBili  <0.2  /  DBili  x   /  AST  21  /  ALT  22  /  AlkPhos  106  12-30 12-29    143  |  102  |  37<H>  ----------------------------<  120<H>  5.0   |  31  |  1.2    Ca    8.7      29 Dec 2023 06:06  Mg     2.5     12-29    TPro  6.5  /  Alb  3.3<L>  /  TBili  <0.2  /  DBili  x   /  AST  20  /  ALT  23  /  AlkPhos  124<H>  12-29      Urinalysis Basic - ( 29 Dec 2023 06:06 )    Color: x / Appearance: x / SG: x / pH: x  Gluc: 120 mg/dL / Ketone: x  / Bili: x / Urobili: x   Blood: x / Protein: x / Nitrite: x   Leuk Esterase: x / RBC: x / WBC x   Sq Epi: x / Non Sq Epi: x / Bacteria: x      ABG - ( 28 Dec 2023 03:46 )  pH, Arterial: 7.40  pH, Blood: x     /  pCO2: 55    /  pO2: 55    / HCO3: 34    / Base Excess: 7.5   /  SaO2: 84.1                      RADIOLOGY:   as noted below  I REV'D OP ECHO FROM 10/23:  LVF-OK; EF 55-60%;  LA ENLARGEMENT; +BIOPROSTHETIC AV    < from: MR Head No Cont (12.29.23 @ 15:04) >  ACC: [id number] EXAM:  MR BRAIN   ORDERED BY: TARYN SIERRA     PROCEDURE DATE:  12/29/2023          INTERPRETATION:  CLINICAL INDICATION: Altered mental status.    TECHNIQUE: Multi-planar multi-sequential MR imaging of the brain was   performed without intravenous contrast.    COMPARISON: Correlated with the same day CT head .    FINDINGS:    There is prominence of the sulci, sylvian fissures, and ventricles likely   reflecting mild diffuse parenchymal volume loss.    There are scattered patchyT2/FLAIR hyperintensities in bilateral   periventricular cerebral white matter, consistent with mild chronic   microvascular ischemic changes.    There are small chronic lacunar infarct in the left corona radiata and   left cerebellum.    No acute infarction, intracranial hemorrhage or mass.    There is no evidence of hydrocephalus. There are no extra-axial fluid   collections. The skull base flow voids are present.    The visualized intraorbital contents are normal. Mild mucosal thickening   in the left sphenoid sinus. The mastoid air cells are clear. The   visualized soft tissues and osseous structures appear normal.    IMPRESSION:    No acute intracranial pathology.    Mild chronic microvascular ischemic changes and small chronic lacunar   infarcts.    --- End of Report ---            LOLY VEGA MD; Attending Radiologist  This document has been electronically signed. Dec 29 2023  3:22PM    < end of copied text >  < from: [facility] Duplex Carotid, Bilat (12.29.23 @ 12:28) >  ******PRELIMINARY REPORT******      ******PRELIMINARY REPORT******         ACC: [id number] EXAM:  CAROTID DUPLEX COMPLETE BILAT   ORDERED BY: TARYN SIERRA     PROCEDURE DATE:  12/29/2023    ******PRELIMINARY REPORT******      ******PRELIMINARY REPORT******           INTERPRETATION:  CLINICAL INFORMATION: 68-year-old male with dizziness    TECHNIQUE: Grayscale, color and spectral Doppler examination of both   carotid arteries was performed.    FINDINGS:    No elevated velocities or abnormal waveforms are encountered.    Peak systolic velocities are as follows:    RIGHT:  PROX CCA = 79 cm/s  DIST CCA = 84 cm/s  PROX ICA = 196 cm/s  DIST ICA = 111 cm/s  ECA = 104 cm/s  ICA/CCA 2.3    LEFT:  PROX CCA = 118 cm/s  DIST CCA = 110 cm/s  PROX ICA = 146 cm/s  DIST ICA = 127 cm/s  ECA = 146 cm/s  ICA/CCA 1.3    Antegrade flow is noted within both vertebral arteries.    IMPRESSION: No significant hemodynamic stenosis of either carotid artery.   40-59% stenosis bilaterally.    Measurement of carotid stenosis is based on velocity parameters that   correlate the residual internal carotid diameter with that of the more   distal vessel in accordance with a method such as the North American   Symptomatic Carotid Endarterectomy Trial (NASCET).        ******PRELIMINARY REPORT******  ****STILL PRELIM.    ******PRELIMINARY     < end of copied text >  < from: CT Head No Cont (12.29.23 @ 03:49) >  ACC: [id number] EXAM:  CT BRAIN   ORDERED BY: CLAUDIA OTT     PROCEDURE DATE:  12/29/2023          INTERPRETATION:  CLINICAL INDICATION: Altered mental status.    Technique: CT of the head was performed without contrast.    Multiple contiguous axial images were acquired from the skullbase to the   vertex without the administration of intravenous contrast.  Coronal and   sagittal reformations were made.    COMPARISON: None    FINDINGS:    There is generalized cortical volume loss with commensurateventricular   dilation. There is no hydrocephalus. .    There is a chronic left caudate head lacunar infarct. There is patchy   periventricular white matter hypodensity. There is no intraparenchymal   hematoma, mass effect or midline shift. No abnormal extra-axial fluid   collections are present.    The calvarium is intact. The visualized intraorbital compartments and   mastoid complexes appear free of acute disease. There is polypoid mucosal   thickening of the left sphenoid sinus. The remainingvisualized paranasal   sinuses are unremarkable.    IMPRESSION:  No acute intracranial pathology. No evidence of midline shift, mass   effect or intracranial hemorrhage.    Mild chronic microvascular type changes as well as a chronic left caudate   lacunar infarct.    --- End of Report ---            JASPREET BAUMAN MD; Attending Radiologist  This document has been electronically signed. Dec 29 2023  9:40A    < end of copied text >  < from: CT Angio Chest PE Protocol w/ IV Cont (12.27.23 @ 21:07) >  ACC: [id number] EXAM:  CT ANGIO CHEST PULM ART WAWIC   ORDERED BY: HOLLY ALY     PROCEDURE DATE:  12/27/2023          INTERPRETATION:  CLINICAL HISTORY / REASON FOR EXAM: Worsening shortness   of breath.    TECHNIQUE: Multislice helical sectionswere obtained from the thoracic   inlet to the lung bases during rapid administration of 75 cc Omnipaque   350 intravenous contrast using a CTA protocol. Thin sections were   reconstructed through the pulmonary vasculature. Sagittal and coronal   reformatted images were acquired, as well as MIP reconstructed images.    COMPARISON: Noncontrast chest CT 12/9/2022    FINDINGS:  Pulmonary embolus: No CT evidence of acute pulmonary embolus.    Lungs/Airways/Pleura: No acute lobar consolidation. No pleural effusion   or pneumothorax. No bronchiectasis. No honeycombing. Small patchy   groundglass density in the right upper lobe (series 4 image 115; series   603 image 140) adjacent to a slightly thickened bronchiole, likely   related to small airwaysinflammation though nonspecific. Bibasilar   subsegmental atelectasis. At the left base atelectasis appears somewhat   more nodular.    Heart/Vascular: Coronary and aortic atherosclerotic calcifications.   Aortic valve replacement. No pericardial effusion    Mediastinum/Lymph nodes: Few right perihilar lymph nodes measuring up to   1.2 cm in short axis, nonspecific, possibly reactive    Visualized upper abdomen: Cholelithiasis    Bones/soft tissues: Sternotomy. Degenerative changes along the vertebral   column. Multiple chronic rib fractures.    IMPRESSION:  1.  No evidence of acute pulmonary embolus.  2.  No acute lobar consolidation.  3.  Small patchy groundglass density in the right upper lobe adjacent to   a slightly thickened bronchiole,nonspecific though likely related to   small airways inflammation.  4.  Few right perihilar lymph nodes, likely reactive.    --- End of Report ---            PURVI LEONE MD; Attending Radiologist  This document has been electronically signed. Dec [id number] 10:50PM    < end of copied text >             Chart reviewed, patient examined. Pertinent results reviewed.  Case discussed with HO; specialist f/u reviewed  HD#6; much improved since admission.    Patient is a 68y old Male who presents with a chief complaint of SOB (28 Dec 2023 09:00)    Primary diagnosis of COPD with respiratory distress, acute; Encephalopathy 2/2 hypoxia.  All due to URi/ COPD exacerbation.    HPI:  59 yo man active smoke, known COPD on BiPAP at night, hx of Rrt DVT, factor V Leiden,  HTN, DL, and hx of AVR (Bioprosthetic , 2017) presenting for confusion and slurred speech which was noticed by his family.  He admits to feeling worsening shortness of breath with increasing cough and sputum associated with worsening lower ext swelling, orthopnea and PND over past 3-4 days. Patient tis not compliant with BiPAP, lasix  or inhalers. Denies purulent sputum, sick contacts, fever, chills, chest pain/    In ED he was found to be hypoxemic and started on O2 then switched to BiPAP after he developed Respiratory acidosis.Work up showed pulmonary congestion, elevated Pro-BNP  CTA showed no PE.  He was given lasix and admitted for further management       This morning patient was seen and examined at bedside, resting comfortably in bed.   He is feeling significantly better than PTA. No nightmares, hallucinations or slurring.  No acute or major events overnight. He used BIPAP overnight. Slept well. Pulse ox- 92% on RA at rest; with walking- dropped to 87%- I walked w patient. NAD        REVIEW OF SYSTEMS:  CONSTITUTIONAL: No fever, weight loss, or fatigue; poor sleep- had recent nightmares; cleared on O2  EYES: No eye pain, visual disturbances, or discharge  ENMT:  No difficulty hearing, tinnitus, vertigo; No sinus or throat pain  NECK: No pain or stiffness  RESPIRATORY: + cough,+ wheezing,+ chills   shortness of breath+; better, still coughing. w < sputum  CARDIOVASCULAR: No chest pain, palpitations, dizziness, + leg swelling- better w diuretic and Rx for DVT  GASTROINTESTINAL: No abdominal or epigastric pain. No nausea, vomiting, or hematemesis; No diarrhea or constipation. No melena or hematochezia.  GENITOURINARY: No dysuria, frequency, hematuria, or incontinence; + nocturia  MUSCULOSKELETAL: No joint pain or swelling; No muscle, back, or extremity pain  PSYCHIATRIC: No depression, anxiety, mood swings, or difficulty sleeping  HEME/LYMPH: No easy bruising, or bleeding gums  ALLERY AND IMMUNOLOGIC: No hives or eczema  NEURO: dysarthria felt foggy; ? hallucinations- had PTA; says + nightmares; no HA        Code Status: Full    PAST MEDICAL & SURGICAL HISTORY  HTN (hypertension)    Dyslipidemia    Factor V Leiden    DVT, lower extremity-unprovoked 4 mos ago- on AC    COPD, moderate    S/P AVR (aortic valve replacement)  PAD w LE Stent      SOCIAL HISTORY:  Social History: Still smokes 1/2 PPD- PTA  Retired [employer] w [employer]-9/11 exposure and F/U      ALLERGIES:  No Known Allergies    MEDICATIONS:  MEDICATIONS  (STANDING):  albuterol/ipratropium for Nebulization 3 milliLiter(s) Nebulizer every 4 hours  atorvastatin 80 milliGRAM(s) Oral at bedtime  azithromycin  IVPB 500 milliGRAM(s) IV Intermittent every 24 hours  budesonide 160 MICROgram(s)/formoterol 4.5 MICROgram(s) Inhaler 2 Puff(s) Inhalation two times a day  budesonide 160 MICROgram(s)/formoterol 4.5 MICROgram(s) Inhaler 2 Puff(s) Inhalation two times a day  dapagliflozin 10 milliGRAM(s) Oral every 24 hours  furosemide    Tablet 40 milliGRAM(s) Oral daily  guaiFENesin  milliGRAM(s) Oral every 12 hours  hydrochlorothiazide 12.5 milliGRAM(s) Oral daily  metoprolol succinate  milliGRAM(s) Oral daily  nicotine -  14 mG/24Hr(s) Patch 1 Patch Transdermal daily  NIFEdipine XL 60 milliGRAM(s) Oral daily  pantoprazole    Tablet 40 milliGRAM(s) Oral before breakfast  predniSONE   Tablet 40 milliGRAM(s) Oral daily  rivaroxaban 20 milliGRAM(s) Oral with dinner  tiotropium 2.5 MICROgram(s) Inhaler 2 Puff(s) Inhalation daily  tiotropium 2.5 MICROgram(s) Inhaler 2 Puff(s) Inhalation daily  valsartan 320 milliGRAM(s) Oral daily    MEDICATIONS  (PRN):        VITALS:   Vital Signs Last 24 Hrs  T(C): 36.7 (02 Jan 2024 07:55), Max: 37.4 (01 Jan 2024 15:30)  T(F): 98.1 (02 Jan 2024 07:55), Max: 99.3 (01 Jan 2024 15:30)  HR: 72 (02 Jan 2024 07:55) (72 - 85)  BP: 167/82 (02 Jan 2024 07:55) (138/57 - 169/78)  BP(mean): --  RR: 18 (02 Jan 2024 07:55) (18 - 18)  SpO2: 98% (02 Jan 2024 07:55) (94% - 98%)    Parameters below as of 02 Jan 2024 07:55  Patient On (Oxygen Delivery Method): room air  with walking- POx: 87%, HR- 78            PHYSICAL EXAM:  s/p CPAP overnight  GENERAL: NAD, lying in bed- on RA; AAOx4; occasional coughing - grey sputum.  HEAD:  Atraumatic, Normocephalic  EYES: EOMI, PERRLA, conjunctiva and sclera clear  ENMT: No tonsillar erythema, exudates, or enlargement; 3/4 airway  NECK: Supple, No JVD, Normal thyroid  NERVOUS SYSTEM:  Alert & Oriented X3, Good concentration;   CHEST/LUNG: DECreased BS; occ exp wheeze and rhonchi R>L  HEART: scar; RRR; No murmurs, rubs, or gallops  ABDOMEN: Soft, Nontender, Nondistended; Bowel sounds present  EXTREMITIES:  , No clubbing, cyanosis, or edema  LYMPH: No lymphadenopathy noted  SKIN: No rashes or lesions    LABS:                                   13.1   6.78  )-----------( 248      ( 02 Jan 2024 07:11 )             40.6                      12.5   8.16  )-----------( 249      ( 30 Dec 2023 06:40 )             39.7                 12.7   8.68  )-----------( 262      ( 29 Dec 2023 06:06 )             39.9     01-02    142  |  102  |  23<H>  ----------------------------<  116<H>  5.4<H>   |  37<H>  |  1.3    Ca    8.8      02 Jan 2024 07:11  Mg     2.4     01-02    TPro  6.1  /  Alb  3.4<L>  /  TBili  0.2  /  DBili  x   /  AST  21  /  ALT  28  /  AlkPhos  106  01-02 12-31    147<H>  |  106  |  27<H>  ----------------------------<  109<H>  5.2<H>   |  37<H>  |  1.1    Ca    8.5      31 Dec 2023 06:29  Mg     2.5     12-31    TPro  6.0  /  Alb  3.2<L>  /  TBili  0.2  /  DBili  x   /  AST  18  /  ALT  23  /  AlkPhos  106  12-31 12-30    147<H>  |  106  |  37<H>  ----------------------------<  107<H>  5.5<H>   |  33<H>  |  1.2    Ca    8.6      30 Dec 2023 06:40  Mg     2.5     12-30    TPro  6.2  /  Alb  3.1<L>  /  TBili  <0.2  /  DBili  x   /  AST  21  /  ALT  22  /  AlkPhos  106  12-30 12-29    143  |  102  |  37<H>  ----------------------------<  120<H>  5.0   |  31  |  1.2    Ca    8.7      29 Dec 2023 06:06  Mg     2.5     12-29    TPro  6.5  /  Alb  3.3<L>  /  TBili  <0.2  /  DBili  x   /  AST  20  /  ALT  23  /  AlkPhos  124<H>  12-29      Urinalysis Basic - ( 29 Dec 2023 06:06 )    Color: x / Appearance: x / SG: x / pH: x  Gluc: 120 mg/dL / Ketone: x  / Bili: x / Urobili: x   Blood: x / Protein: x / Nitrite: x   Leuk Esterase: x / RBC: x / WBC x   Sq Epi: x / Non Sq Epi: x / Bacteria: x      ABG - ( 28 Dec 2023 03:46 )  pH, Arterial: 7.40  pH, Blood: x     /  pCO2: 55    /  pO2: 55    / HCO3: 34    / Base Excess: 7.5   /  SaO2: 84.1                      RADIOLOGY:   as noted below  I REV'D OP ECHO FROM 10/23:  LVF-OK; EF 55-60%;  LA ENLARGEMENT; +BIOPROSTHETIC AV    < from: MR Head No Cont (12.29.23 @ 15:04) >  ACC: [id number] EXAM:  MR BRAIN   ORDERED BY: TARYN SIERRA     PROCEDURE DATE:  12/29/2023          INTERPRETATION:  CLINICAL INDICATION: Altered mental status.    TECHNIQUE: Multi-planar multi-sequential MR imaging of the brain was   performed without intravenous contrast.    COMPARISON: Correlated with the same day CT head .    FINDINGS:    There is prominence of the sulci, sylvian fissures, and ventricles likely   reflecting mild diffuse parenchymal volume loss.    There are scattered patchyT2/FLAIR hyperintensities in bilateral   periventricular cerebral white matter, consistent with mild chronic   microvascular ischemic changes.    There are small chronic lacunar infarct in the left corona radiata and   left cerebellum.    No acute infarction, intracranial hemorrhage or mass.    There is no evidence of hydrocephalus. There are no extra-axial fluid   collections. The skull base flow voids are present.    The visualized intraorbital contents are normal. Mild mucosal thickening   in the left sphenoid sinus. The mastoid air cells are clear. The   visualized soft tissues and osseous structures appear normal.    IMPRESSION:    No acute intracranial pathology.    Mild chronic microvascular ischemic changes and small chronic lacunar   infarcts.    --- End of Report ---            LOLY VEGA MD; Attending Radiologist  This document has been electronically signed. Dec 29 2023  3:22PM    < end of copied text >  < from: VA Duplex Carotid, Bilat (12.29.23 @ 12:28) >  ******PRELIMINARY REPORT******      ******PRELIMINARY REPORT******         ACC: [id number] EXAM:  CAROTID DUPLEX COMPLETE BILAT   ORDERED BY: TARYN SIERRA     PROCEDURE DATE:  12/29/2023    ******PRELIMINARY REPORT******      ******PRELIMINARY REPORT******           INTERPRETATION:  CLINICAL INFORMATION: 68-year-old male with dizziness    TECHNIQUE: Grayscale, color and spectral Doppler examination of both   carotid arteries was performed.    FINDINGS:    No elevated velocities or abnormal waveforms are encountered.    Peak systolic velocities are as follows:    RIGHT:  PROX CCA = 79 cm/s  DIST CCA = 84 cm/s  PROX ICA = 196 cm/s  DIST ICA = 111 cm/s  ECA = 104 cm/s  ICA/CCA 2.3    LEFT:  PROX CCA = 118 cm/s  DIST CCA = 110 cm/s  PROX ICA = 146 cm/s  DIST ICA = 127 cm/s  ECA = 146 cm/s  ICA/CCA 1.3    Antegrade flow is noted within both vertebral arteries.    IMPRESSION: No significant hemodynamic stenosis of either carotid artery.   40-59% stenosis bilaterally.    Measurement of carotid stenosis is based on velocity parameters that   correlate the residual internal carotid diameter with that of the more   distal vessel in accordance with a method such as the North American   Symptomatic Carotid Endarterectomy Trial (NASCET).        ******PRELIMINARY REPORT******  ****STILL PRELIM.    ******PRELIMINARY     < end of copied text >  < from: CT Head No Cont (12.29.23 @ 03:49) >  ACC: [id number] EXAM:  CT BRAIN   ORDERED BY: CLAUDIA OTT     PROCEDURE DATE:  12/29/2023          INTERPRETATION:  CLINICAL INDICATION: Altered mental status.    Technique: CT of the head was performed without contrast.    Multiple contiguous axial images were acquired from the skullbase to the   vertex without the administration of intravenous contrast.  Coronal and   sagittal reformations were made.    COMPARISON: None    FINDINGS:    There is generalized cortical volume loss with commensurateventricular   dilation. There is no hydrocephalus. .    There is a chronic left caudate head lacunar infarct. There is patchy   periventricular white matter hypodensity. There is no intraparenchymal   hematoma, mass effect or midline shift. No abnormal extra-axial fluid   collections are present.    The calvarium is intact. The visualized intraorbital compartments and   mastoid complexes appear free of acute disease. There is polypoid mucosal   thickening of the left sphenoid sinus. The remainingvisualized paranasal   sinuses are unremarkable.    IMPRESSION:  No acute intracranial pathology. No evidence of midline shift, mass   effect or intracranial hemorrhage.    Mild chronic microvascular type changes as well as a chronic left caudate   lacunar infarct.    --- End of Report ---            JASPREET BAUMAN MD; Attending Radiologist  This document has been electronically signed. Dec 29 2023  9:40A    < end of copied text >  < from: CT Angio Chest PE Protocol w/ IV Cont (12.27.23 @ 21:07) >  ACC: [id number] EXAM:  CT ANGIO CHEST PULM ART WAWIC   ORDERED BY: HOLLY ALY     PROCEDURE DATE:  12/27/2023          INTERPRETATION:  CLINICAL HISTORY / REASON FOR EXAM: Worsening shortness   of breath.    TECHNIQUE: Multislice helical sectionswere obtained from the thoracic   inlet to the lung bases during rapid administration of 75 cc Omnipaque   350 intravenous contrast using a CTA protocol. Thin sections were   reconstructed through the pulmonary vasculature. Sagittal and coronal   reformatted images were acquired, as well as MIP reconstructed images.    COMPARISON: Noncontrast chest CT 12/9/2022    FINDINGS:  Pulmonary embolus: No CT evidence of acute pulmonary embolus.    Lungs/Airways/Pleura: No acute lobar consolidation. No pleural effusion   or pneumothorax. No bronchiectasis. No honeycombing. Small patchy   groundglass density in the right upper lobe (series 4 image 115; series   603 image 140) adjacent to a slightly thickened bronchiole, likely   related to small airwaysinflammation though nonspecific. Bibasilar   subsegmental atelectasis. At the left base atelectasis appears somewhat   more nodular.    Heart/Vascular: Coronary and aortic atherosclerotic calcifications.   Aortic valve replacement. No pericardial effusion    Mediastinum/Lymph nodes: Few right perihilar lymph nodes measuring up to   1.2 cm in short axis, nonspecific, possibly reactive    Visualized upper abdomen: Cholelithiasis    Bones/soft tissues: Sternotomy. Degenerative changes along the vertebral   column. Multiple chronic rib fractures.    IMPRESSION:  1.  No evidence of acute pulmonary embolus.  2.  No acute lobar consolidation.  3.  Small patchy groundglass density in the right upper lobe adjacent to   a slightly thickened bronchiole,nonspecific though likely related to   small airways inflammation.  4.  Few right perihilar lymph nodes, likely reactive.    --- End of Report ---            PURVI LEONE MD; Attending Radiologist  This document has been electronically signed. Dec [id number] 10:50PM    < end of copied text >

## 2024-01-02 NOTE — DISCHARGE NOTE NURSING/CASE MANAGEMENT/SOCIAL WORK - NSDCPEEMAIL_GEN_ALL_CORE
[facility] for Tobacco Control email [email or web address] for Tobacco [email or web address] email [email or web address]

## 2024-01-02 NOTE — DISCHARGE NOTE NURSING/CASE MANAGEMENT/SOCIAL WORK - NSDCPEFALRISK_GEN_ALL_CORE
For information on Fall & Injury Prevention, visit: [email or web address]-[email or web address]-mobility OR  [email or web address]-[email or web address]-injury OR  https://www.cdc.gov/steadi/patient.html For information on Fall & Injury Prevention, visit: [email or web address]://[email or web address]/[email or web address]-[email or web address]-[email or web address]-mobility OR  [email or web address]://www.[facility]/news/fall-prevention-tips-to-avoid-injury OR  https://www.cdc.gov/steadi/patient.html

## 2024-01-02 NOTE — DISCHARGE NOTE NURSING/CASE MANAGEMENT/SOCIAL WORK - NSDCPEPAMP_GEN_ALL_CORE
“[facility] for Tobacco Control” pamphlet given “[facility] for Tobacco Control” pamphlet given

## 2024-01-02 NOTE — DISCHARGE NOTE NURSING/CASE MANAGEMENT/SOCIAL WORK - NSDCPEHOTLINE_GEN_ALL_CORE
[facility] Smokers Quitline [phone]-DUGSWMP ([phone]) [facility] Smokers Quitline [phone]-[phone] ([phone])

## 2024-01-02 NOTE — DISCHARGE NOTE NURSING/CASE MANAGEMENT/SOCIAL WORK - PATIENT PORTAL LINK FT
You can access the FollowMyHealth Patient Portal offered by [facility] by registering at the following website: [email or web address]. By joining [facility]’s FollowMyHealth portal, you will also be able to view your health information using other applications (apps) compatible with our system. You can access the FollowMyHealth Patient Portal offered by [facility] by registering at the following website: [email or web address]. By joining [facility]’s FollowMyHealth portal, you will also be able to view your health information using other applications (apps) compatible with our system.

## 2024-01-03 ENCOUNTER — TRANSCRIPTION ENCOUNTER (OUTPATIENT)
Age: 69
End: 2024-01-03

## 2024-01-04 NOTE — CHART NOTE - NSCHARTNOTEFT_GEN_A_CORE
Dx : _COPD___ (ICD10: ____)  - Room air pulse ox. at rest:  92%   - Room air pulse ox. while ambulatin%  - Pulse ox while ambulating on 2 liters n/c  O2 92%    Patient will require home O2 for discharge.  Patient is aware and agreeable to home O2.  Patient is in a chronic stable state of COPD.
Patient self scheduled appt for heme-onc.
Patient has COPD and his o2 saturation is 92 % on rest and is 87% on room air with ambulation and improves to 92% on 2L Nasal cannula while ambulating ,  needs home oxygen

## 2024-01-05 ENCOUNTER — TRANSCRIPTION ENCOUNTER (OUTPATIENT)
Age: 69
End: 2024-01-05

## 2024-01-08 DIAGNOSIS — I50.33 ACUTE ON CHRONIC DIASTOLIC (CONGESTIVE) HEART FAILURE: ICD-10-CM

## 2024-01-08 DIAGNOSIS — G93.40 ENCEPHALOPATHY, UNSPECIFIED: ICD-10-CM

## 2024-01-08 DIAGNOSIS — J44.1 CHRONIC OBSTRUCTIVE PULMONARY DISEASE WITH (ACUTE) EXACERBATION: ICD-10-CM

## 2024-01-08 DIAGNOSIS — Z99.89 DEPENDENCE ON OTHER ENABLING MACHINES AND DEVICES: ICD-10-CM

## 2024-01-08 DIAGNOSIS — D53.9 NUTRITIONAL ANEMIA, UNSPECIFIED: ICD-10-CM

## 2024-01-08 DIAGNOSIS — E87.20 ACIDOSIS, UNSPECIFIED: ICD-10-CM

## 2024-01-08 DIAGNOSIS — E87.5 HYPERKALEMIA: ICD-10-CM

## 2024-01-08 DIAGNOSIS — J96.02 ACUTE RESPIRATORY FAILURE WITH HYPERCAPNIA: ICD-10-CM

## 2024-01-08 DIAGNOSIS — D68.51 ACTIVATED PROTEIN C RESISTANCE: ICD-10-CM

## 2024-01-08 DIAGNOSIS — Z91.199 PATIENT'S NONCOMPLIANCE WITH OTHER MEDICAL TREATMENT AND REGIMEN DUE TO UNSPECIFIED REASON: ICD-10-CM

## 2024-01-08 DIAGNOSIS — F17.210 NICOTINE DEPENDENCE, CIGARETTES, UNCOMPLICATED: ICD-10-CM

## 2024-01-08 DIAGNOSIS — I11.0 HYPERTENSIVE HEART DISEASE WITH HEART FAILURE: ICD-10-CM

## 2024-01-08 DIAGNOSIS — Z86.718 PERSONAL HISTORY OF OTHER VENOUS THROMBOSIS AND EMBOLISM: ICD-10-CM

## 2024-01-08 DIAGNOSIS — G47.33 OBSTRUCTIVE SLEEP APNEA (ADULT) (PEDIATRIC): ICD-10-CM

## 2024-01-08 DIAGNOSIS — J96.01 ACUTE RESPIRATORY FAILURE WITH HYPOXIA: ICD-10-CM

## 2024-01-08 PROBLEM — E78.5 HYPERLIPIDEMIA, UNSPECIFIED: Chronic | Status: ACTIVE | Noted: 2023-12-28

## 2024-01-08 PROBLEM — I82.409 ACUTE EMBOLISM AND THROMBOSIS OF UNSPECIFIED DEEP VEINS OF UNSPECIFIED LOWER EXTREMITY: Chronic | Status: ACTIVE | Noted: 2023-12-28

## 2024-01-08 PROBLEM — I10 ESSENTIAL (PRIMARY) HYPERTENSION: Chronic | Status: ACTIVE | Noted: 2023-12-28

## 2024-01-08 PROBLEM — J44.9 CHRONIC OBSTRUCTIVE PULMONARY DISEASE, UNSPECIFIED: Chronic | Status: ACTIVE | Noted: 2023-12-28

## 2024-01-15 ENCOUNTER — APPOINTMENT (OUTPATIENT)
Dept: HEMATOLOGY ONCOLOGY | Facility: CLINIC | Age: 69
End: 2024-01-15
Payer: MEDICARE

## 2024-01-15 ENCOUNTER — OUTPATIENT (OUTPATIENT)
Dept: OUTPATIENT SERVICES | Facility: HOSPITAL | Age: 69
LOS: 1 days | End: 2024-01-15
Payer: MEDICARE

## 2024-01-15 ENCOUNTER — LABORATORY RESULT (OUTPATIENT)
Age: 69
End: 2024-01-15

## 2024-01-15 VITALS
OXYGEN SATURATION: 98 % | HEIGHT: 70 IN | HEART RATE: 69 BPM | WEIGHT: 210 LBS | TEMPERATURE: 98.4 F | SYSTOLIC BLOOD PRESSURE: 127 MMHG | RESPIRATION RATE: 14 BRPM | BODY MASS INDEX: 30.06 KG/M2 | DIASTOLIC BLOOD PRESSURE: 67 MMHG

## 2024-01-15 DIAGNOSIS — Z86.718 PERSONAL HISTORY OF OTHER VENOUS THROMBOSIS AND EMBOLISM: ICD-10-CM

## 2024-01-15 DIAGNOSIS — I35.0 NONRHEUMATIC AORTIC (VALVE) STENOSIS: ICD-10-CM

## 2024-01-15 DIAGNOSIS — Z15.89 GENETIC SUSCEPTIBILITY TO OTHER DISEASE: ICD-10-CM

## 2024-01-15 DIAGNOSIS — F17.210 NICOTINE DEPENDENCE, CIGARETTES, UNCOMPLICATED: ICD-10-CM

## 2024-01-15 DIAGNOSIS — D68.51 ACTIVATED PROTEIN C RESISTANCE: ICD-10-CM

## 2024-01-15 DIAGNOSIS — Z95.2 PRESENCE OF PROSTHETIC HEART VALVE: Chronic | ICD-10-CM

## 2024-01-15 LAB
HCT VFR BLD CALC: 41.1 %
HGB BLD-MCNC: 13.7 G/DL
MCHC RBC-ENTMCNC: 33.1 PG
MCHC RBC-ENTMCNC: 33.3 G/DL
MCV RBC AUTO: 99.3 FL
PLATELET # BLD AUTO: 132 K/UL
PMV BLD: 9.7 FL
RBC # BLD: 4.14 M/UL
RBC # FLD: 13.8 %
WBC # FLD AUTO: 5.26 K/UL

## 2024-01-15 PROCEDURE — 86146 BETA-2 GLYCOPROTEIN ANTIBODY: CPT

## 2024-01-15 PROCEDURE — 83090 ASSAY OF HOMOCYSTEINE: CPT

## 2024-01-15 PROCEDURE — 99213 OFFICE O/P EST LOW 20 MIN: CPT

## 2024-01-15 PROCEDURE — 85027 COMPLETE CBC AUTOMATED: CPT

## 2024-01-15 PROCEDURE — 86147 CARDIOLIPIN ANTIBODY EA IG: CPT

## 2024-01-15 NOTE — CONSULT LETTER
[Dear  ___] : Dear  [unfilled], [Courtesy Letter:] : I had the pleasure of seeing your patient, [unfilled], in my office today. [Please see my note below.] : Please see my note below. [Consult Closing:] : Thank you very much for allowing me to participate in the care of this patient.  If you have any questions, please do not hesitate to contact me. [Sincerely,] : Sincerely, [FreeTextEntry3] : Dr. ROBERTO Chow [DrTammy  ___] : Dr. IBRAHIM

## 2024-01-15 NOTE — PHYSICAL EXAM
[Restricted in physically strenuous activity but ambulatory and able to carry out work of a light or sedentary nature] : Status 1- Restricted in physically strenuous activity but ambulatory and able to carry out work of a light or sedentary nature, e.g., light house work, office work [Obese] : obese [Normal] : affect appropriate [de-identified] : B/L +1 pitting edema of LE [de-identified] : Chronic B/L LE skin changes

## 2024-01-15 NOTE — HISTORY OF PRESENT ILLNESS
[de-identified] : 68 year old male referred by Dr Garcia for DVT . Chronic smoker with PMH of aortic valve replacement . PAD s/p RLE angioplasty ,  Partial right knee arthroplasty in Feb 2023 . Right hip arthritis, Spinal stenosis  , Chronic back pain with radiculopathy . In August 2023 and while on aspirin , he was diagnosed with DVT of RLE with PE . and has been on xarelto since then .  He is sedentary , denies recent travel or trauma .  FH : daughter with factor 5 Leiden  [de-identified] : 11/14/2023 Patient returns for follow up , he continues on xarelto , he denies abnormal bleeding . He has mild chronic RLE swelling , chronic low back pain with radiculopathy work up confirmed the presence of factor 5 Leiden mutation , beta 2 glycoprotein igG 38 , MTHFR homozygous mutation , homocysteine 23 ,   1/15/2024: Pt presents for follow-up regarding his VTE. He continues to be on Xarelto and reports compliance. He reports seeing Dr. Garcia and has undergone US of B/L LE with his RLE thrombosis, which appears to be getting worse. He is scheduled to see him this week to discuss treatment options. Otherwise, he is doing well. He denies pain in his LE extremities with only mild chronic R > L LE swelling, which improved with lasix.

## 2024-01-15 NOTE — ASSESSMENT
[FreeTextEntry1] : 68-year-old male with unprovoked RLE DVT with PE with multiple risk factors, including sedentary lifestyle, arthritis, spinal stenosis with radiculopathy, immobility, factor 5 Leiden mutation, male gender, and active smoker.   Slightly elevated beta 2 glycoprotein IgG antibodies.   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  RLE DVT propagated per pt.   Plan:  - He will follow up with vascular to discuss treatment option - c/w Xarelto for now - will repeat APL panel including BG2P Ab and Anticardiolipin Ab (hold off on LA since pt is on Xarelto) - will also repeat homocysteine level with fasting today - given multiple risk factors and worsened DVTs, if APL antibodies persist, may need to consider switching NOAC to coumadin or Pradaxa  - Pt will benefit from indefinite anticoagulation for secondary prevention, given his multiple risk factors   RTC in 3 months or sooner to discuss AC options.

## 2024-01-16 DIAGNOSIS — Z86.718 PERSONAL HISTORY OF OTHER VENOUS THROMBOSIS AND EMBOLISM: ICD-10-CM

## 2024-01-16 LAB — HCYS SERPL-MCNC: 17.4 UMOL/L

## 2024-01-17 ENCOUNTER — TRANSCRIPTION ENCOUNTER (OUTPATIENT)
Age: 69
End: 2024-01-17

## 2024-01-17 LAB
B2 GLYCOPROT1 IGG SER-ACNC: 23.8 SGU
B2 GLYCOPROT1 IGM SER-ACNC: <5 SMU
CARDIOLIPIN IGM SER-MCNC: <5 GPL
CARDIOLIPIN IGM SER-MCNC: <5 MPL

## 2024-01-18 LAB — CARDIOLIPIN AB SER IA-ACNC: NEGATIVE

## 2024-01-24 ENCOUNTER — APPOINTMENT (OUTPATIENT)
Dept: PULMONOLOGY | Facility: CLINIC | Age: 69
End: 2024-01-24
Payer: MEDICARE

## 2024-01-24 VITALS
WEIGHT: 210 LBS | DIASTOLIC BLOOD PRESSURE: 72 MMHG | HEART RATE: 87 BPM | HEIGHT: 70 IN | OXYGEN SATURATION: 96 % | BODY MASS INDEX: 30.06 KG/M2 | RESPIRATION RATE: 14 BRPM | SYSTOLIC BLOOD PRESSURE: 124 MMHG

## 2024-01-24 DIAGNOSIS — F17.290 NICOTINE DEPENDENCE, OTHER TOBACCO PRODUCT, UNCOMPLICATED: ICD-10-CM

## 2024-01-24 PROCEDURE — 99214 OFFICE O/P EST MOD 30 MIN: CPT | Mod: 25

## 2024-01-24 PROCEDURE — 99406 BEHAV CHNG SMOKING 3-10 MIN: CPT

## 2024-01-24 PROCEDURE — G2211 COMPLEX E/M VISIT ADD ON: CPT

## 2024-01-24 RX ORDER — NICOTINE 4 MG/1
10 INHALANT RESPIRATORY (INHALATION)
Qty: 30 | Refills: 5 | Status: ACTIVE | COMMUNITY
Start: 2024-01-24 | End: 1900-01-01

## 2024-01-24 NOTE — REASON FOR VISIT
[Follow-Up - From Hospitalization] : a follow-up visit after a recent hospitalization [COPD] : COPD [Nicotine Dependence] : nicotine dependence

## 2024-01-24 NOTE — COUNSELING
[Cessation strategies including cessation program discussed] : Cessation strategies including cessation program discussed [Encouraged to pick a quit date and identify support needed to quit] : Encouraged to pick a quit date and identify support needed to quit [Use of nicotine replacement therapies and other medications discussed] : Use of nicotine replacement therapies and other medications discussed [Smoking Cessation Program Referral] : Smoking Cessation Program Referral  [Yes] : Willing to quit smoking [FreeTextEntry1] : 8

## 2024-01-24 NOTE — HISTORY OF PRESENT ILLNESS
[Current] : current [>= 20 pack years] : >= 20 pack years [TextBox_11] : 1 [TextBox_4] : Mr. PETERSON is a 68 year man with a medical history significant for factor V Leiden, lower extremity DVT, and extensive tobacco abuse history presenting today to the clinic for hospitalization last month for acute exacerbation of COPD.  Patient summary: Was hospitalized for severe exacerbation of COPD at the end of December 2023.  This hospitalization was also complicated by acute volume overload and acute decompensated heart failure. Occupational Hx: [employer], 9/11 exposure  Interval history: Is feeling much better now, using trelegy once per day.  Barely using the albuterol rescue inhaler.  Wants to quit smoking, being prescribed nicotine replacement therapy from his PCP. [TextBox_13] : 50

## 2024-01-26 ENCOUNTER — TRANSCRIPTION ENCOUNTER (OUTPATIENT)
Age: 69
End: 2024-01-26

## 2024-01-31 ENCOUNTER — TRANSCRIPTION ENCOUNTER (OUTPATIENT)
Age: 69
End: 2024-01-31

## 2024-02-01 ENCOUNTER — APPOINTMENT (OUTPATIENT)
Dept: PULMONOLOGY | Facility: HOSPITAL | Age: 69
End: 2024-02-01
Payer: MEDICARE

## 2024-02-01 ENCOUNTER — OUTPATIENT (OUTPATIENT)
Dept: OUTPATIENT SERVICES | Facility: HOSPITAL | Age: 69
LOS: 1 days | End: 2024-02-01
Payer: MEDICARE

## 2024-02-01 DIAGNOSIS — Z95.2 PRESENCE OF PROSTHETIC HEART VALVE: Chronic | ICD-10-CM

## 2024-02-01 DIAGNOSIS — J43.2 CENTRILOBULAR EMPHYSEMA: ICD-10-CM

## 2024-02-01 PROCEDURE — 94664 DEMO&/EVAL PT USE INHALER: CPT

## 2024-02-01 PROCEDURE — 94729 DIFFUSING CAPACITY: CPT | Mod: 26

## 2024-02-01 PROCEDURE — 94726 PLETHYSMOGRAPHY LUNG VOLUMES: CPT | Mod: 26

## 2024-02-01 PROCEDURE — 94729 DIFFUSING CAPACITY: CPT

## 2024-02-01 PROCEDURE — 94060 EVALUATION OF WHEEZING: CPT | Mod: 26

## 2024-02-01 PROCEDURE — 94726 PLETHYSMOGRAPHY LUNG VOLUMES: CPT

## 2024-02-01 PROCEDURE — 94070 EVALUATION OF WHEEZING: CPT

## 2024-02-02 DIAGNOSIS — J43.2 CENTRILOBULAR EMPHYSEMA: ICD-10-CM

## 2024-02-20 ENCOUNTER — OUTPATIENT (OUTPATIENT)
Dept: OUTPATIENT SERVICES | Facility: HOSPITAL | Age: 69
LOS: 1 days | End: 2024-02-20
Payer: MEDICARE

## 2024-02-20 VITALS
DIASTOLIC BLOOD PRESSURE: 76 MMHG | HEART RATE: 84 BPM | WEIGHT: 212.97 LBS | TEMPERATURE: 98 F | SYSTOLIC BLOOD PRESSURE: 133 MMHG | HEIGHT: 70 IN | OXYGEN SATURATION: 97 % | RESPIRATION RATE: 16 BRPM

## 2024-02-20 DIAGNOSIS — I70.219 ATHEROSCLEROSIS OF NATIVE ARTERIES OF EXTREMITIES WITH INTERMITTENT CLAUDICATION, UNSPECIFIED EXTREMITY: ICD-10-CM

## 2024-02-20 DIAGNOSIS — Z01.818 ENCOUNTER FOR OTHER PREPROCEDURAL EXAMINATION: ICD-10-CM

## 2024-02-20 DIAGNOSIS — Z95.2 PRESENCE OF PROSTHETIC HEART VALVE: Chronic | ICD-10-CM

## 2024-02-20 LAB
ALBUMIN SERPL ELPH-MCNC: 4 G/DL — SIGNIFICANT CHANGE UP (ref 3.5–5.2)
ALP SERPL-CCNC: 101 U/L — SIGNIFICANT CHANGE UP (ref 30–115)
ALT FLD-CCNC: 19 U/L — SIGNIFICANT CHANGE UP (ref 0–41)
ANION GAP SERPL CALC-SCNC: 9 MMOL/L — SIGNIFICANT CHANGE UP (ref 7–14)
APTT BLD: 41.4 SEC — HIGH (ref 27–39.2)
AST SERPL-CCNC: 32 U/L — SIGNIFICANT CHANGE UP (ref 0–41)
BILIRUB SERPL-MCNC: 0.3 MG/DL — SIGNIFICANT CHANGE UP (ref 0.2–1.2)
BUN SERPL-MCNC: 19 MG/DL — SIGNIFICANT CHANGE UP (ref 10–20)
CALCIUM SERPL-MCNC: 9 MG/DL — SIGNIFICANT CHANGE UP (ref 8.4–10.5)
CHLORIDE SERPL-SCNC: 101 MMOL/L — SIGNIFICANT CHANGE UP (ref 98–110)
CO2 SERPL-SCNC: 27 MMOL/L — SIGNIFICANT CHANGE UP (ref 17–32)
CREAT SERPL-MCNC: 1.1 MG/DL — SIGNIFICANT CHANGE UP (ref 0.7–1.5)
EGFR: 73 ML/MIN/1.73M2 — SIGNIFICANT CHANGE UP
GLUCOSE SERPL-MCNC: 120 MG/DL — HIGH (ref 70–99)
INR BLD: 1.41 RATIO — HIGH (ref 0.65–1.3)
POTASSIUM SERPL-MCNC: 5.1 MMOL/L — HIGH (ref 3.5–5)
POTASSIUM SERPL-SCNC: 5.1 MMOL/L — HIGH (ref 3.5–5)
PROT SERPL-MCNC: 6.5 G/DL — SIGNIFICANT CHANGE UP (ref 6–8)
PROTHROM AB SERPL-ACNC: 16.1 SEC — HIGH (ref 9.95–12.87)
SODIUM SERPL-SCNC: 137 MMOL/L — SIGNIFICANT CHANGE UP (ref 135–146)

## 2024-02-20 PROCEDURE — 36415 COLL VENOUS BLD VENIPUNCTURE: CPT

## 2024-02-20 PROCEDURE — 99214 OFFICE O/P EST MOD 30 MIN: CPT | Mod: 25

## 2024-02-20 PROCEDURE — 80053 COMPREHEN METABOLIC PANEL: CPT

## 2024-02-20 PROCEDURE — 85730 THROMBOPLASTIN TIME PARTIAL: CPT

## 2024-02-20 PROCEDURE — 85610 PROTHROMBIN TIME: CPT

## 2024-02-20 NOTE — H&P PST ADULT - REASON FOR ADMISSION
67 yo male presents for PAST in preparation for left lower extremity angiogram possible endovascular revascularization on 3/4/2024 under [facility] by Dr. Garcia ([facility]).

## 2024-02-20 NOTE — H&P PST ADULT - HISTORY OF COVID-19 VACCINATION
[FreeTextEntry1] : 72 yo F with CAD (s/p mLAD PCI), DM, HTN, HLD, p A fib (on sotalol and eliquis) who presents for f/u. She is stable.  CAD - continue ASA 81 and atorvastatin 80 qd  HTN - continue Losartan 100 qd and amlodipine 2.5 qd  HLD - continue atorvastatin 80 qd  P afib (followed by EP) - continue sotalol 80 BID (cr normal QTC ~440) - continue eliquis 5 BID  RTC in 6m
Yes

## 2024-02-20 NOTE — H&P PST ADULT - NSICDXPASTMEDICALHX_GEN_ALL_CORE_FT
PAST MEDICAL HISTORY:  COPD, moderate     DVT, lower extremity     Dyslipidemia     Factor V Leiden     H/O aortic valve disease     HTN (hypertension)     PAD (peripheral artery disease)      PAST MEDICAL HISTORY:  COPD, moderate     DVT, lower extremity     Dyslipidemia     Factor V Leiden     H/O aortic valve disease     HTN (hypertension)     ANIKET (obstructive sleep apnea)     PAD (peripheral artery disease)

## 2024-02-20 NOTE — H&P PST ADULT - NSALCOHOLUSECOMMENT_GEN_ALL_CORE_FT
wine daily with dinner
1. Monitor PO intake/appetite, GI distress, diet tolerance, labs, weights.  2. Honor pt food preferences as able.  3. RD to remain available for additional nutrition interventions as needed.  ** High Nutrition Risk.

## 2024-02-21 DIAGNOSIS — I70.219 ATHEROSCLEROSIS OF NATIVE ARTERIES OF EXTREMITIES WITH INTERMITTENT CLAUDICATION, UNSPECIFIED EXTREMITY: ICD-10-CM

## 2024-02-21 DIAGNOSIS — Z01.818 ENCOUNTER FOR OTHER PREPROCEDURAL EXAMINATION: ICD-10-CM

## 2024-02-22 ENCOUNTER — NON-APPOINTMENT (OUTPATIENT)
Age: 69
End: 2024-02-22

## 2024-03-01 NOTE — ASU PATIENT PROFILE, ADULT - NSICDXPASTSURGICALHX_GEN_ALL_CORE_FT
PAST SURGICAL HISTORY:  S/P AVR (aortic valve replacement)     Status post right partial knee replacement

## 2024-03-01 NOTE — ASU PATIENT PROFILE, ADULT - NSICDXPASTMEDICALHX_GEN_ALL_CORE_FT
PAST MEDICAL HISTORY:  COPD, moderate     DVT, lower extremity     Dyslipidemia     Factor V Leiden     H/O aortic valve disease     HTN (hypertension)     ANIKET (obstructive sleep apnea) uses c pap    PAD (peripheral artery disease)

## 2024-03-04 ENCOUNTER — TRANSCRIPTION ENCOUNTER (OUTPATIENT)
Age: 69
End: 2024-03-04

## 2024-03-04 ENCOUNTER — OUTPATIENT (OUTPATIENT)
Dept: OUTPATIENT SERVICES | Facility: HOSPITAL | Age: 69
LOS: 1 days | Discharge: HOME | End: 2024-03-04
Payer: MEDICARE

## 2024-03-04 VITALS
WEIGHT: 212.97 LBS | OXYGEN SATURATION: 97 % | HEIGHT: 70 IN | DIASTOLIC BLOOD PRESSURE: 67 MMHG | HEART RATE: 75 BPM | RESPIRATION RATE: 18 BRPM | SYSTOLIC BLOOD PRESSURE: 127 MMHG | TEMPERATURE: 98 F

## 2024-03-04 VITALS
TEMPERATURE: 98 F | HEART RATE: 84 BPM | DIASTOLIC BLOOD PRESSURE: 76 MMHG | SYSTOLIC BLOOD PRESSURE: 153 MMHG | RESPIRATION RATE: 21 BRPM | OXYGEN SATURATION: 94 %

## 2024-03-04 DIAGNOSIS — Z96.651 PRESENCE OF RIGHT ARTIFICIAL KNEE JOINT: Chronic | ICD-10-CM

## 2024-03-04 DIAGNOSIS — Z95.2 PRESENCE OF PROSTHETIC HEART VALVE: Chronic | ICD-10-CM

## 2024-03-04 DIAGNOSIS — I70.219 ATHEROSCLEROSIS OF NATIVE ARTERIES OF EXTREMITIES WITH INTERMITTENT CLAUDICATION, UNSPECIFIED EXTREMITY: ICD-10-CM

## 2024-03-04 PROCEDURE — C1769: CPT

## 2024-03-04 PROCEDURE — 73590 X-RAY EXAM OF LOWER LEG: CPT | Mod: LT

## 2024-03-04 PROCEDURE — C1766: CPT

## 2024-03-04 PROCEDURE — C1887: CPT

## 2024-03-04 PROCEDURE — C1725: CPT

## 2024-03-04 RX ORDER — CLOPIDOGREL BISULFATE 75 MG/1
75 TABLET, FILM COATED ORAL ONCE
Refills: 0 | Status: COMPLETED | OUTPATIENT
Start: 2024-03-04 | End: 2024-03-04

## 2024-03-04 RX ORDER — SODIUM CHLORIDE 9 MG/ML
1000 INJECTION, SOLUTION INTRAVENOUS
Refills: 0 | Status: DISCONTINUED | OUTPATIENT
Start: 2024-03-04 | End: 2024-03-04

## 2024-03-04 RX ORDER — HYDROMORPHONE HYDROCHLORIDE 2 MG/ML
1 INJECTION INTRAMUSCULAR; INTRAVENOUS; SUBCUTANEOUS
Refills: 0 | Status: DISCONTINUED | OUTPATIENT
Start: 2024-03-04 | End: 2024-03-04

## 2024-03-04 RX ORDER — HYDROMORPHONE HYDROCHLORIDE 2 MG/ML
0.5 INJECTION INTRAMUSCULAR; INTRAVENOUS; SUBCUTANEOUS
Refills: 0 | Status: DISCONTINUED | OUTPATIENT
Start: 2024-03-04 | End: 2024-03-04

## 2024-03-04 RX ORDER — BUDESONIDE AND FORMOTEROL FUMARATE DIHYDRATE 160; 4.5 UG/1; UG/1
2 AEROSOL RESPIRATORY (INHALATION)
Refills: 0 | DISCHARGE

## 2024-03-04 RX ORDER — NIFEDIPINE 30 MG
1 TABLET, EXTENDED RELEASE 24 HR ORAL
Refills: 0 | DISCHARGE

## 2024-03-04 RX ORDER — OMEPRAZOLE 10 MG/1
1 CAPSULE, DELAYED RELEASE ORAL
Refills: 0 | DISCHARGE

## 2024-03-04 RX ORDER — ONDANSETRON 8 MG/1
4 TABLET, FILM COATED ORAL ONCE
Refills: 0 | Status: DISCONTINUED | OUTPATIENT
Start: 2024-03-04 | End: 2024-03-04

## 2024-03-04 RX ORDER — ALBUTEROL 90 UG/1
2 AEROSOL, METERED ORAL
Refills: 0 | DISCHARGE

## 2024-03-04 RX ADMIN — CLOPIDOGREL BISULFATE 75 MILLIGRAM(S): 75 TABLET, FILM COATED ORAL at 18:30

## 2024-03-04 NOTE — CHART NOTE - NSCHARTNOTEFT_GEN_A_CORE
PACU ANESTHESIA ADMISSION NOTE      Procedure: RLE angiogram w/ revascularization     ____  Intubated  TV:______       Rate: ______      FiO2: ______    ___x_  Patent Airway    __x__  Full return of protective reflexes    __x__  Full recovery from anesthesia / back to baseline     \      Mental Status:  ___x_ Awake   _____ Alert   _____ Drowsy   _____ Sedated    Nausea/Vomiting:  __x__ NO  ______Yes,   See Post - Op Orders          Pain Scale (0-10):  _0____    Treatment: ____ None    ____ See Post - Op/PCA Orders    Post - Operative Fluids:   ____ Oral   ___x_ See Post - Op Orders    Plan: Discharge:   __x__Home       _____Floor     _____Critical Care    _____  Other:_________________    Comments:

## 2024-03-04 NOTE — ASU DISCHARGE PLAN (ADULT/PEDIATRIC) - CARE PROVIDER_API CALL
Mayco Garcia  Vascular Surgery  [address] [place] [address]  Phone: [phone]  Fax: [phone]  Follow Up Time:

## 2024-03-04 NOTE — PRE-ANESTHESIA EVALUATION ADULT - NS MD HP INPLANTS MED DEV
Artificial joint/Vascular stents/Clips/Heart valve
Artificial joint/Vascular stents/Clips/Heart valve

## 2024-03-04 NOTE — BRIEF OPERATIVE NOTE - TYPE OF ANESTHESIA
2 RN skin check complete with Heidy STEIN.   Devices in place glasses.  Skin assessed under devices red and blanching.  Confirmed pressure ulcers found on n/a.  New potential pressure ulcers noted on n/a. Wound consult placed no.  The following interventions in place pillows in use for support/positioning.    Bilateral ears red and blanching.  Elbows dry and intact.  Sacrum pink and intact.  Heels pink and blanching.   General

## 2024-03-04 NOTE — BRIEF OPERATIVE NOTE - OPERATION/FINDINGS
Left leg angiogram, aortogram bilateral lower extremity angiogram, left common iliac and left external iliac artery angioplasty. left hypo occluded, right hypo open.

## 2024-03-04 NOTE — PRE-ANESTHESIA EVALUATION ADULT - NSRADCARDRESULTSFT_GEN_ALL_CORE
EKG:    Ventricular Rate 91 BPM    Atrial Rate 91 BPM    P-R Interval 164 ms    QRS Duration 104 ms    Q-T Interval 376 ms    QTC Calculation(Bazett) 462 ms    P Axis 70 degrees    R Axis 81 degrees    T Axis 70 degrees    Diagnosis Line Normal sinus rhythm  Possible Left atrial enlargement  Borderline ECG    Confirmed by CHELSI JACKSON, MICHELLE ([id number]) on 12/27/2023 9:43:20 PM    CXR:    ACC: [id number] EXAM:  XR CHEST PORTABLE ROUTINE 1V   ORDERED BY: TODD WAGNER     PROCEDURE DATE:  12/29/2023          INTERPRETATION:  CLINICAL INDICATION: Dyspnea    COMPARISON: Chest radiograph dated 12/27/2023    TECHNIQUE: Frontal radiograph the chest.    FINDINGS:    Support devices: None.    Cardiac/mediastinum/hilum: Unchanged.    Lung parenchyma/Pleura: No focal consolidation, pleural effusion, or   pneumothorax.    Skeleton/soft tissues: Unchanged.    IMPRESSION:    No radiographic evidence of acute cardiopulmonary disease.    --- End of Report ---          PAPA SUERO MD; Resident Radiologist  This document has been electronically signed.  BEN ALFRED MD; Attending Interventional Radiologist  This document has been electronically signed. Dec 29 2023 10:35AM    ECHO:    ACC: [id number] EXAM:  ECHO TTE WITH CON COMP W DOPP                          PROCEDURE DATE:  12/29/2023          INTERPRETATION:   [place] [address]                Phone: [phone].   TRANSTHORACIC ECHOCARDIOGRAM REPORT        Patient Name:   DAVID PETERSON Accession #: [id number]  Medical Rec #:  [id number]           Height:      70.0 in 177.8 cm  YOB: 1955            Weight:      205.0 lb 92.99 kg  Patient Age:    68 years            BSA:         2.11 m²  Patient Gender: M                   BP:          142/72 mmHg      Date of Exam:        12/29/2023 10:58:39 AM  Referring Physician: Carl Welch  Sonographer:         Louise Sharpe  Fellow:              Lars Bonilla  Reading Physician:   Jacqui Leung MD, [facility].    Procedure:   2D Echo/Doppler/Color Doppler Complete and Echocardiogram   with               Definity Contrast.  Indications: I42.9 - Cardiomyopathy, unspecified  Diagnosis:   I42.9 - Cardiomyopathy, unspecified        Summary:   1. LV Ejection Fraction by Tee's Method with a biplane EF of 67 %.   2. Endocardial visualization was enhanced with intravenous echo contrast.   3. Normal global left ventricular systolic function.   4. Apical lateral segment and apex are abnormal as described above.   5. Mildly enlarged left atrium.   6. Echo and/or Doppler findings consistent with normal bioprosthetic   aortic valve function.   7. No valvular or paravalvular aortic regurgitation.   8. Mild tricuspid regurgitation.

## 2024-03-04 NOTE — PRE-ANESTHESIA EVALUATION ADULT - NSANTHPMHFT_GEN_ALL_CORE
Class I Obesity, h/o COPD exacerbation - December 2023 - c/b CHF - on 2L NC home O2, Anemia  Denies f/c, n/v, SOB/CP, or recent URI.  <4 METS

## 2024-03-04 NOTE — ASU DISCHARGE PLAN (ADULT/PEDIATRIC) - ASU DC SPECIAL INSTRUCTIONSFT
Diet: You may resume your usual diet. Avoid alcohol and excessive salt intake for two weeks following surgery. This will help to minimize swelling.    Please resume normal full dose ASA tomorrow night and Xarelto beginning tomorrow night.    Medication: Take Tylenol 650mg every 6 hours and Advil 600mg every 8hr.     Activity: Start walking as soon as possible to increase circulation and prevent blood clots. You may take care of your personal needs as desired, however, no lifting (>10-15 lbs) or strenuous activity is allowed for 4 weeks following surgery.     Wound care:  Do not smoke! It delays wound healing and increases the risk of complications.    Personal Hygiene: You are allowed to shower. Do not scrub the operative area with soap and water. allow water to wash over the area and pat dry.     Sun Exposure: You may be in the sun one month following surgery. Avoid sunburn. Always use a sunscreen of at least SPF30.    Things to expect: The operative area may be bruised, swollen, and painful. You may also have temporary numbness which will improve over time.    In case of emergency: call the office any time day or night. Post-operative care will be provided in the office one week following surgery. If you do not already have an appointment, please call during regular office hours to schedule.    We wish you a pleasant recovery.

## 2024-03-04 NOTE — ASU PREOP CHECKLIST - WARM FLUIDS/WARM BLANKETS
Pt up to bathroom with CNA, called for staff assist. Pt slumped over in toilet, per CNA same event happened 4/21, pt voided in toilet. Pt assisted back to bed, began having seizure like activity, shaking of body, but pt was able to respond verbally to state her name and followed commands, no airway compromise. Pt placed on 3L oxymask, /85. Medicated with 0.5 mg Ativan PRN, pt now calm and shaking has resolved. Seizure precautions remain in place.    no

## 2024-03-07 DIAGNOSIS — I70.8 ATHEROSCLEROSIS OF OTHER ARTERIES: ICD-10-CM

## 2024-03-07 DIAGNOSIS — Z95.2 PRESENCE OF PROSTHETIC HEART VALVE: ICD-10-CM

## 2024-03-07 DIAGNOSIS — G47.33 OBSTRUCTIVE SLEEP APNEA (ADULT) (PEDIATRIC): ICD-10-CM

## 2024-03-07 DIAGNOSIS — Z79.01 LONG TERM (CURRENT) USE OF ANTICOAGULANTS: ICD-10-CM

## 2024-03-07 DIAGNOSIS — I73.9 PERIPHERAL VASCULAR DISEASE, UNSPECIFIED: ICD-10-CM

## 2024-03-07 DIAGNOSIS — D68.51 ACTIVATED PROTEIN C RESISTANCE: ICD-10-CM

## 2024-03-07 DIAGNOSIS — I10 ESSENTIAL (PRIMARY) HYPERTENSION: ICD-10-CM

## 2024-03-07 DIAGNOSIS — Z86.718 PERSONAL HISTORY OF OTHER VENOUS THROMBOSIS AND EMBOLISM: ICD-10-CM

## 2024-03-07 DIAGNOSIS — J44.9 CHRONIC OBSTRUCTIVE PULMONARY DISEASE, UNSPECIFIED: ICD-10-CM

## 2024-03-07 DIAGNOSIS — F17.210 NICOTINE DEPENDENCE, CIGARETTES, UNCOMPLICATED: ICD-10-CM

## 2024-03-07 DIAGNOSIS — Z79.82 LONG TERM (CURRENT) USE OF ASPIRIN: ICD-10-CM

## 2024-03-07 DIAGNOSIS — E78.5 HYPERLIPIDEMIA, UNSPECIFIED: ICD-10-CM

## 2024-04-03 PROBLEM — I73.9 PERIPHERAL VASCULAR DISEASE, UNSPECIFIED: Chronic | Status: ACTIVE | Noted: 2024-02-20

## 2024-04-03 PROBLEM — G47.33 OBSTRUCTIVE SLEEP APNEA (ADULT) (PEDIATRIC): Chronic | Status: ACTIVE | Noted: 2024-02-20

## 2024-04-16 ENCOUNTER — APPOINTMENT (OUTPATIENT)
Age: 69
End: 2024-04-16
Payer: MEDICARE

## 2024-04-16 ENCOUNTER — OUTPATIENT (OUTPATIENT)
Dept: OUTPATIENT SERVICES | Facility: HOSPITAL | Age: 69
LOS: 1 days | End: 2024-04-16
Payer: MEDICARE

## 2024-04-16 ENCOUNTER — LABORATORY RESULT (OUTPATIENT)
Age: 69
End: 2024-04-16

## 2024-04-16 VITALS
HEIGHT: 69 IN | BODY MASS INDEX: 32.88 KG/M2 | TEMPERATURE: 98 F | OXYGEN SATURATION: 99 % | HEART RATE: 81 BPM | SYSTOLIC BLOOD PRESSURE: 126 MMHG | RESPIRATION RATE: 16 BRPM | WEIGHT: 222 LBS | DIASTOLIC BLOOD PRESSURE: 71 MMHG

## 2024-04-16 DIAGNOSIS — Z95.2 PRESENCE OF PROSTHETIC HEART VALVE: Chronic | ICD-10-CM

## 2024-04-16 DIAGNOSIS — Z86.718 PERSONAL HISTORY OF OTHER VENOUS THROMBOSIS AND EMBOLISM: ICD-10-CM

## 2024-04-16 DIAGNOSIS — Z96.651 PRESENCE OF RIGHT ARTIFICIAL KNEE JOINT: Chronic | ICD-10-CM

## 2024-04-16 DIAGNOSIS — E78.00 PURE HYPERCHOLESTEROLEMIA, UNSPECIFIED: ICD-10-CM

## 2024-04-16 PROBLEM — Z86.79 PERSONAL HISTORY OF OTHER DISEASES OF THE CIRCULATORY SYSTEM: Chronic | Status: ACTIVE | Noted: 2024-02-20

## 2024-04-16 PROCEDURE — 99213 OFFICE O/P EST LOW 20 MIN: CPT

## 2024-04-16 PROCEDURE — 80053 COMPREHEN METABOLIC PANEL: CPT

## 2024-04-16 PROCEDURE — 85027 COMPLETE CBC AUTOMATED: CPT

## 2024-04-17 DIAGNOSIS — E78.00 PURE HYPERCHOLESTEROLEMIA, UNSPECIFIED: ICD-10-CM

## 2024-04-18 LAB
ALBUMIN SERPL ELPH-MCNC: 3.9 G/DL
ALP BLD-CCNC: 78 U/L
ALT SERPL-CCNC: 15 U/L
ANION GAP SERPL CALC-SCNC: 9 MMOL/L
AST SERPL-CCNC: 23 U/L
BILIRUB SERPL-MCNC: <0.2 MG/DL
BUN SERPL-MCNC: 24 MG/DL
CALCIUM SERPL-MCNC: 8.8 MG/DL
CHLORIDE SERPL-SCNC: 109 MMOL/L
CO2 SERPL-SCNC: 25 MMOL/L
CREAT SERPL-MCNC: 1.2 MG/DL
EGFR: 65 ML/MIN/1.73M2
GLUCOSE SERPL-MCNC: 111 MG/DL
HCT VFR BLD CALC: 38.1 %
HGB BLD-MCNC: 12.9 G/DL
MCHC RBC-ENTMCNC: 33.2 PG
MCHC RBC-ENTMCNC: 33.9 G/DL
MCV RBC AUTO: 98.2 FL
PLATELET # BLD AUTO: 165 K/UL
PMV BLD: 8.7 FL
POTASSIUM SERPL-SCNC: 5.1 MMOL/L
PROT SERPL-MCNC: 6 G/DL
RBC # BLD: 3.88 M/UL
RBC # FLD: 15.3 %
SODIUM SERPL-SCNC: 143 MMOL/L
WBC # FLD AUTO: 5.63 K/UL

## 2024-04-22 PROBLEM — Z86.718 HISTORY OF DVT (DEEP VEIN THROMBOSIS): Status: ACTIVE | Noted: 2023-09-15

## 2024-05-03 ENCOUNTER — OUTPATIENT (OUTPATIENT)
Dept: OUTPATIENT SERVICES | Facility: HOSPITAL | Age: 69
LOS: 1 days | End: 2024-05-03
Payer: MEDICARE

## 2024-05-03 DIAGNOSIS — Z96.651 PRESENCE OF RIGHT ARTIFICIAL KNEE JOINT: Chronic | ICD-10-CM

## 2024-05-03 DIAGNOSIS — Z95.2 PRESENCE OF PROSTHETIC HEART VALVE: Chronic | ICD-10-CM

## 2024-05-03 DIAGNOSIS — Z00.8 ENCOUNTER FOR OTHER GENERAL EXAMINATION: ICD-10-CM

## 2024-05-03 DIAGNOSIS — I25.10 ATHEROSCLEROTIC HEART DISEASE OF NATIVE CORONARY ARTERY WITHOUT ANGINA PECTORIS: ICD-10-CM

## 2024-05-03 PROCEDURE — A9500: CPT

## 2024-05-03 PROCEDURE — 78452 HT MUSCLE IMAGE SPECT MULT: CPT

## 2024-05-03 PROCEDURE — 78452 HT MUSCLE IMAGE SPECT MULT: CPT | Mod: 26,MH

## 2024-05-04 DIAGNOSIS — I25.10 ATHEROSCLEROTIC HEART DISEASE OF NATIVE CORONARY ARTERY WITHOUT ANGINA PECTORIS: ICD-10-CM

## 2024-05-07 ENCOUNTER — APPOINTMENT (OUTPATIENT)
Dept: PULMONOLOGY | Facility: CLINIC | Age: 69
End: 2024-05-07
Payer: MEDICARE

## 2024-05-07 VITALS
OXYGEN SATURATION: 96 % | DIASTOLIC BLOOD PRESSURE: 60 MMHG | HEIGHT: 70 IN | RESPIRATION RATE: 14 BRPM | BODY MASS INDEX: 29.78 KG/M2 | HEART RATE: 87 BPM | WEIGHT: 208 LBS | SYSTOLIC BLOOD PRESSURE: 110 MMHG

## 2024-05-07 DIAGNOSIS — F17.210 NICOTINE DEPENDENCE, CIGARETTES, UNCOMPLICATED: ICD-10-CM

## 2024-05-07 DIAGNOSIS — Z01.811 ENCOUNTER FOR PREPROCEDURAL RESPIRATORY EXAMINATION: ICD-10-CM

## 2024-05-07 DIAGNOSIS — J43.2 CENTRILOBULAR EMPHYSEMA: ICD-10-CM

## 2024-05-07 PROCEDURE — 99406 BEHAV CHNG SMOKING 3-10 MIN: CPT

## 2024-05-07 PROCEDURE — G2211 COMPLEX E/M VISIT ADD ON: CPT

## 2024-05-07 PROCEDURE — 99214 OFFICE O/P EST MOD 30 MIN: CPT

## 2024-05-07 RX ORDER — ALBUTEROL SULFATE 90 UG/1
108 (90 BASE) INHALANT RESPIRATORY (INHALATION)
Qty: 1 | Refills: 11 | Status: ACTIVE | COMMUNITY
Start: 2024-01-24 | End: 1900-01-01

## 2024-05-07 RX ORDER — FLUTICASONE FUROATE, UMECLIDINIUM BROMIDE AND VILANTEROL TRIFENATATE 100; 62.5; 25 UG/1; UG/1; UG/1
100-62.5-25 POWDER RESPIRATORY (INHALATION) DAILY
Qty: 1 | Refills: 3 | Status: ACTIVE | COMMUNITY
Start: 2024-01-24 | End: 1900-01-01

## 2024-05-07 NOTE — COUNSELING
[Cessation strategies including cessation program discussed] : Cessation strategies including cessation program discussed [Use of nicotine replacement therapies and other medications discussed] : Use of nicotine replacement therapies and other medications discussed [Encouraged to pick a quit date and identify support needed to quit] : Encouraged to pick a quit date and identify support needed to quit [Smoking Cessation Program Referral] : Smoking Cessation Program Referral  [Yes] : Willing to quit smoking [FreeTextEntry1] : 8

## 2024-05-07 NOTE — CONSULT LETTER
[Dear  ___] : Dear  [unfilled], [Courtesy Letter:] : I had the pleasure of seeing your patient, [unfilled], in my office today. [Please see my note below.] : Please see my note below. [Consult Closing:] : Thank you very much for allowing me to participate in the care of this patient.  If you have any questions, please do not hesitate to contact me. [Sincerely,] : Sincerely, [DrTammy  ___] : Dr. IBRHAIM [FreeTextEntry3] : Dr. ROBERTO Chow

## 2024-05-07 NOTE — HISTORY OF PRESENT ILLNESS
[Current] : current [>= 20 pack years] : >= 20 pack years [TextBox_4] : Mr. PETERSON is a 68 year man with a medical history significant for factor V Leiden, lower extremity DVT, and extensive tobacco abuse history presenting today to the clinic for hospitalization last month for acute exacerbation of COPD.  Patient summary: Was hospitalized for severe exacerbation of COPD at the end of December 2023.  This hospitalization was also complicated by acute volume overload and acute decompensated heart failure. Occupational Hx: [employer], 9/11 exposure  Interval history: Is feeling much better now, using trelegy once per day.  Barely using the albuterol rescue inhaler.  Wants to quit smoking, being prescribed nicotine replacement therapy from his PCP. Now smoking about 5-10 cig/day Still trying to quit Happy with inhalers Having hip surgery on Monday [TextBox_11] : 1 [TextBox_13] : 50

## 2024-05-07 NOTE — PHYSICAL EXAM
[Restricted in physically strenuous activity but ambulatory and able to carry out work of a light or sedentary nature] : Status 1- Restricted in physically strenuous activity but ambulatory and able to carry out work of a light or sedentary nature, e.g., light house work, office work [Obese] : obese [Normal] : affect appropriate [de-identified] : 1+ pitting edema bilaterally  [de-identified] : Chronic B/L LE skin changes

## 2024-05-07 NOTE — ASSESSMENT
[FreeTextEntry1] : 69-year-old male with unprovoked RLE DVT with PE with multiple risk factors, including sedentary lifestyle, arthritis, spinal stenosis with radiculopathy, immobility, factor 5 Leiden mutation, male gender, and active smoker.   Slightly elevated beta 2 glycoprotein IgG antibodies.   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  RLE DVT propagated per pt.   PLAN:  -Discussed indefinite anticoagulation given APL antibodies (B2 glycoprotein mildly elevated) and Factor V Leiden mutation  --Continue on Xarelto 20 mg daily --Follow up with Dr. Garcia as scheduled --Follow up with ortho surgery for hip replacement  --Will check CBC, CMP today   RTC in 6 months   Patient was seen and examined and discussed with Dr. Chow who agrees to the above plan of care.   P.S patient is scheduled for hip arthroplasty on 5/13/2024 . He is recommended to hold Xarelto for 2 days prior to surgery . Anticoagulation can resume 6 - 12 hours post operatively if hemostasis is achieved . If he is hospitalized can start with heparin 5000 q8 or Enoxaparin 40 mg daily and on discharge , resume xarfelto 20 mg daily if no excessive bleeding noted ( otherwise can start with 10 mg daily and increase to 20 mg )

## 2024-05-07 NOTE — HISTORY OF PRESENT ILLNESS
[de-identified] : 68 year old male referred by Dr Garcia for DVT . Chronic smoker with PMH of aortic valve replacement . PAD s/p RLE angioplasty ,  Partial right knee arthroplasty in Feb 2023 . Right hip arthritis, Spinal stenosis  , Chronic back pain with radiculopathy . In August 2023 and while on aspirin , he was diagnosed with DVT of RLE with PE . and has been on xarelto since then .  He is sedentary , denies recent travel or trauma .  FH : daughter with factor 5 Leiden  [de-identified] : 11/14/2023 Patient returns for follow up , he continues on xarelto , he denies abnormal bleeding . He has mild chronic RLE swelling , chronic low back pain with radiculopathy work up confirmed the presence of factor 5 Leiden mutation , beta 2 glycoprotein igG 38 , MTHFR homozygous mutation , homocysteine 23 ,   1/15/2024: Pt presents for follow-up regarding his VTE. He continues to be on Xarelto and reports compliance. He reports seeing Dr. Garcia and has undergone US of B/L LE with his RLE thrombosis, which appears to be getting worse. He is scheduled to see him this week to discuss treatment options. Otherwise, he is doing well. He denies pain in his LE extremities with only mild chronic R > L LE swelling, which improved with lasix.    4/16/2024: Patient returns for follow up for AC management after his VTE. He continues on Xarelto and has been on itfor about 4 months. He reportedly had a repeat US with Dr. Garcia which showed clearing of the LE thrombosis. He will be going for a hip replacement in a few weeks. Reports swelling in the LE for which is take Lasix, swelling sometimes causes pain.

## 2024-05-10 ENCOUNTER — OUTPATIENT (OUTPATIENT)
Dept: OUTPATIENT SERVICES | Facility: HOSPITAL | Age: 69
LOS: 1 days | End: 2024-05-10

## 2024-05-10 ENCOUNTER — APPOINTMENT (OUTPATIENT)
Dept: PULMONOLOGY | Facility: HOSPITAL | Age: 69
End: 2024-05-10

## 2024-05-10 DIAGNOSIS — R06.02 SHORTNESS OF BREATH: ICD-10-CM

## 2024-05-10 DIAGNOSIS — Z96.651 PRESENCE OF RIGHT ARTIFICIAL KNEE JOINT: Chronic | ICD-10-CM

## 2024-05-10 DIAGNOSIS — Z95.2 PRESENCE OF PROSTHETIC HEART VALVE: Chronic | ICD-10-CM

## 2024-05-11 DIAGNOSIS — R06.02 SHORTNESS OF BREATH: ICD-10-CM

## 2024-05-30 VITALS
DIASTOLIC BLOOD PRESSURE: 68 MMHG | SYSTOLIC BLOOD PRESSURE: 131 MMHG | WEIGHT: 207.9 LBS | OXYGEN SATURATION: 95 % | HEIGHT: 70 IN | RESPIRATION RATE: 16 BRPM | HEART RATE: 73 BPM

## 2024-05-30 NOTE — H&P CARDIOLOGY - HISTORY OF PRESENT ILLNESS
Mr. Townsend is a 68 year man with a medical history significant for factor V Leiden, lower extremity DVT, and extensive tobacco abuse history presenting today for [facility]    Patient summary:  Was hospitalized for severe exacerbation of COPD at the end of December 2023. This hospitalization was also complicated by acute volume overload and acute decompensated heart failure.  Occupational Hx: [employer], 9/11 exposure  Interval history:  Is feeling much better now, using trelegy once per day. Barely using the albuterol rescue inhaler. Wants to quit smoking, being prescribed nicotine replacement therapy from his PCP.  Now smoking about 5-10 cig/day  Still trying to quit  Happy with inhalers  Having hip surgery on Monday.   Smoking Status: current, >= 20 pack years   # Packs per day: 1   # Years: 50     Active Problems  Aortic valve stenosis (424.1) (I35.0)  Arthritis of right hip (716.95) (M16.11)  Centrilobular emphysema (492.8) (J43.2)  Heterozygous factor V Leiden mutation (289.81) (D68.51)  History of DVT (deep vein thrombosis) (V12.51) (Z86.718)  Hypercholesteremia (272.0) (E78.00)  Lumbar foraminal stenosis (724.02) (M48.061)  MTHFR gene mutation (V84.89) (Z15.89)  Spondylolisthesis at L5-S1 level (756.12) (M43.17)  Tobacco dependence due to cigarettes (305.1) (F17.210)      Past Medical History  History of Bilateral carpal tunnel syndrome (354.0) (G56.03)  History of Bilateral cervical nerve root lesions (353.2) (G54.2)  History of Bilateral lumbosacral nerve root lesions (353.4) (G54.4)  History of Blurred vision, bilateral (368.8) (H53.8)  History of neck pain (V13.59) (Z87.39)  History of Simple partial seizure disorder (345.50) (G40.109)    Pre cath note:    indication:  [ ] STEMI                [ ] NSTEMI                 [ ] Acute coronary syndrome                         [ ]Unstable Angina   [ ] high risk  [ ] intermediate risk  [ ] low risk                         [ ] Stable Angina     non-invasive testing:                          Date:                     result: [ ] high risk  [ ] intermediate risk  [ ] low risk    Anti- Anginal medications:                        [ ] not used                       [ ] used:  [ ]CCB  [ ] BB  [ ] Nitrate [ ] Ranexa                [ ] not used but strong indication not to use    Ejection Fraction                       [ ] <29            [ ] 30-39%   [ ] 40-49%     [ ]>50%    CHF                       [ ] active (within last 14 days on meds   [ ] Chronic (on meds but no exacerbation)    COPD                        [ ] mild (on chronic bronchodilators)  [x ] moderate (on chronic steroid therapy)      [ ] severe (indication for home O2 or PACO2 >50)    Other risk factors:                         [ ] Previous MI                       [ ] CVA/ stroke                      [ ] carotid stent/ CEA                      [ ] PVD/PAD- (arterial aneurysm, non-palpable pulses, tortuous vessel with inability to insert catheter, infra-renal dissection, renal or subclavian artery stenosis)                      [ ] diabetic                      [ ] previous CABG                      [ ] Renal Failure       Adjusted CathPCI Bleeding Event Risk:   %    RIGHT RADIAL ARTERY EVALUATION:  DUDLEY TEST: [ ] Negative          [ ] Positive    IVF: 250cc NS bolus pre-cath hydration [ ]           68 year M, current smoker, [employer], 9/11 exposure, with PMHx of HTN, HLD, ANIKET on CPAP, COPD, AVR 2017 (porcine), factor V Leiden, h/o RLE DVT (on Xarelto - last dose on 5/31/24), last hospitalization in 12/2023 for severe exacerbation of COPD, c/b by acute deccompensated heart failure, who presented to his cardiologist for follow up, and in need of pre-op cardiac clearance prior to total hip replacement surgery.  Pt does admit to [facility], on walking 1/2 to 1 block, relieved with rest.  Denies any chest pain, dizziness, n/v, diaphoresis, orthopnea, PND, LE edema, palpitations, syncope. Pt underwent a NST on 5/3/24 which revealed moderate to severe defect involving the inferior wall in the mid to distal inferolateral and apical segment, and partial reperfusion noted in the distal inferolateral and apical segments, EF 43%.   Pt is now referred for C with possible intervention if clinically indicated.     Pre cath note:    indication:  [ ] STEMI                [ ] NSTEMI                 [ ] Acute coronary syndrome                     [ ]Unstable Angina   [ ] high risk  [ ] intermediate risk  [ ] low risk                     [ x] Stable Angina     non-invasive testing:    NST            Date:    5/3/24             result: [ ] high risk  [ x] intermediate risk  [ ] low risk    Anti- Anginal medications:                    [ ] not used                       [x ] used                   [ ] not used but strong indication not to use  -on BB and CCB    Ejection Fraction                   [ ] <29            [ ] 30-39%   [ x] 40-49%     [ ]>50%    CHF                   [ ] active (within last 14 days on meds   [x ] Chronic (on meds but no exacerbation)    COPD                   [x ] mild (on chronic bronchodilators)  [ ] moderate (on chronic steroid therapy)      [ ] severe (indication for home O2 or PACO2 >50)    Other risk factors:                       [ ] Previous MI                     [ ] CVA/ stroke                    [ ] carotid stent/ CEA                    [ ] PVD/PAD- (arterial aneurysm, non-palpable pulses, tortuous vessel with inability to insert catheter, infra-renal dissection, renal or subclavian artery stenosis)                    [ ] diabetic                    [ ] previous CABG                    [ ] Renal Failure                Right Shashi Test: positive    Adjusted Cath Bleeding Risk: 0.8%    IVF: 250cc NS bolus followed by NS @ 50/hr

## 2024-05-30 NOTE — H&P CARDIOLOGY - COMMENTS
68 year M, current smoker, [employer], 9/11 exposure, with PMHx of HTN, HLD, ANIKET on CPAP, COPD, AVR 2017 (porcine), factor V Leiden, h/o RLE DVT (on Xarelto - last dose on 5/31/24), last hospitalization in 12/2023 for severe exacerbation of COPD, c/b by acute deccompensated heart failure, who presented to his cardiologist in need of pre-op cardiac clearance prior to total hip replacement surgery, and with c/o CARPIO, with subsequent abnormal NST on 5/3/24.  Pt is now referred for LHC with possible intervention if clinically indicated.

## 2024-05-31 RX ORDER — ASPIRIN/CALCIUM CARB/MAGNESIUM 324 MG
2 TABLET ORAL
Refills: 0 | DISCHARGE

## 2024-06-04 ENCOUNTER — TRANSCRIPTION ENCOUNTER (OUTPATIENT)
Age: 69
End: 2024-06-04

## 2024-06-04 ENCOUNTER — OUTPATIENT (OUTPATIENT)
Dept: OUTPATIENT SERVICES | Facility: HOSPITAL | Age: 69
LOS: 1 days | Discharge: HOME | End: 2024-06-04
Payer: MEDICARE

## 2024-06-04 VITALS
SYSTOLIC BLOOD PRESSURE: 145 MMHG | RESPIRATION RATE: 14 BRPM | OXYGEN SATURATION: 94 % | HEART RATE: 84 BPM | DIASTOLIC BLOOD PRESSURE: 65 MMHG

## 2024-06-04 DIAGNOSIS — Z96.651 PRESENCE OF RIGHT ARTIFICIAL KNEE JOINT: Chronic | ICD-10-CM

## 2024-06-04 DIAGNOSIS — I25.10 ATHEROSCLEROTIC HEART DISEASE OF NATIVE CORONARY ARTERY WITHOUT ANGINA PECTORIS: ICD-10-CM

## 2024-06-04 DIAGNOSIS — Z95.2 PRESENCE OF PROSTHETIC HEART VALVE: Chronic | ICD-10-CM

## 2024-06-04 DIAGNOSIS — R09.89 OTHER SPECIFIED SYMPTOMS AND SIGNS INVOLVING THE CIRCULATORY AND RESPIRATORY SYSTEMS: ICD-10-CM

## 2024-06-04 LAB
ANION GAP SERPL CALC-SCNC: 10 MMOL/L — SIGNIFICANT CHANGE UP (ref 7–14)
ANION GAP SERPL CALC-SCNC: 11 MMOL/L — SIGNIFICANT CHANGE UP (ref 7–14)
BUN SERPL-MCNC: 22 MG/DL — HIGH (ref 10–20)
BUN SERPL-MCNC: 24 MG/DL — HIGH (ref 10–20)
CALCIUM SERPL-MCNC: 8.8 MG/DL — SIGNIFICANT CHANGE UP (ref 8.4–10.5)
CALCIUM SERPL-MCNC: 9 MG/DL — SIGNIFICANT CHANGE UP (ref 8.4–10.5)
CHLORIDE SERPL-SCNC: 104 MMOL/L — SIGNIFICANT CHANGE UP (ref 98–110)
CHLORIDE SERPL-SCNC: 107 MMOL/L — SIGNIFICANT CHANGE UP (ref 98–110)
CO2 SERPL-SCNC: 24 MMOL/L — SIGNIFICANT CHANGE UP (ref 17–32)
CO2 SERPL-SCNC: 26 MMOL/L — SIGNIFICANT CHANGE UP (ref 17–32)
CREAT SERPL-MCNC: 1 MG/DL — SIGNIFICANT CHANGE UP (ref 0.7–1.5)
CREAT SERPL-MCNC: 1.3 MG/DL — SIGNIFICANT CHANGE UP (ref 0.7–1.5)
EGFR: 59 ML/MIN/1.73M2 — LOW
EGFR: 81 ML/MIN/1.73M2 — SIGNIFICANT CHANGE UP
GLUCOSE SERPL-MCNC: 102 MG/DL — HIGH (ref 70–99)
GLUCOSE SERPL-MCNC: 106 MG/DL — HIGH (ref 70–99)
HCT VFR BLD CALC: 36.6 % — LOW (ref 42–52)
HCT VFR BLD CALC: 40.4 % — LOW (ref 42–52)
HGB BLD-MCNC: 12.4 G/DL — LOW (ref 14–18)
HGB BLD-MCNC: 13.4 G/DL — LOW (ref 14–18)
MCHC RBC-ENTMCNC: 33.2 G/DL — SIGNIFICANT CHANGE UP (ref 32–37)
MCHC RBC-ENTMCNC: 33.2 PG — HIGH (ref 27–31)
MCHC RBC-ENTMCNC: 33.4 PG — HIGH (ref 27–31)
MCHC RBC-ENTMCNC: 33.9 G/DL — SIGNIFICANT CHANGE UP (ref 32–37)
MCV RBC AUTO: 100 FL — HIGH (ref 80–94)
MCV RBC AUTO: 98.7 FL — HIGH (ref 80–94)
NRBC # BLD: 0 /100 WBCS — SIGNIFICANT CHANGE UP (ref 0–0)
NRBC # BLD: 0 /100 WBCS — SIGNIFICANT CHANGE UP (ref 0–0)
PLATELET # BLD AUTO: 144 K/UL — SIGNIFICANT CHANGE UP (ref 130–400)
PLATELET # BLD AUTO: 158 K/UL — SIGNIFICANT CHANGE UP (ref 130–400)
PMV BLD: 10.3 FL — SIGNIFICANT CHANGE UP (ref 7.4–10.4)
PMV BLD: 9.7 FL — SIGNIFICANT CHANGE UP (ref 7.4–10.4)
POTASSIUM SERPL-MCNC: 4.4 MMOL/L — SIGNIFICANT CHANGE UP (ref 3.5–5)
POTASSIUM SERPL-MCNC: 4.5 MMOL/L — SIGNIFICANT CHANGE UP (ref 3.5–5)
POTASSIUM SERPL-SCNC: 4.4 MMOL/L — SIGNIFICANT CHANGE UP (ref 3.5–5)
POTASSIUM SERPL-SCNC: 4.5 MMOL/L — SIGNIFICANT CHANGE UP (ref 3.5–5)
RBC # BLD: 3.71 M/UL — LOW (ref 4.7–6.1)
RBC # BLD: 4.04 M/UL — LOW (ref 4.7–6.1)
RBC # FLD: 14.3 % — SIGNIFICANT CHANGE UP (ref 11.5–14.5)
RBC # FLD: 14.3 % — SIGNIFICANT CHANGE UP (ref 11.5–14.5)
SODIUM SERPL-SCNC: 139 MMOL/L — SIGNIFICANT CHANGE UP (ref 135–146)
SODIUM SERPL-SCNC: 143 MMOL/L — SIGNIFICANT CHANGE UP (ref 135–146)
WBC # BLD: 4.64 K/UL — LOW (ref 4.8–10.8)
WBC # BLD: 5.88 K/UL — SIGNIFICANT CHANGE UP (ref 4.8–10.8)
WBC # FLD AUTO: 4.64 K/UL — LOW (ref 4.8–10.8)
WBC # FLD AUTO: 5.88 K/UL — SIGNIFICANT CHANGE UP (ref 4.8–10.8)

## 2024-06-04 PROCEDURE — C1769: CPT

## 2024-06-04 PROCEDURE — 93454 CORONARY ARTERY ANGIO S&I: CPT | Mod: 59

## 2024-06-04 PROCEDURE — 92972 PERQ TRLUML CORONRY LITHOTRP: CPT

## 2024-06-04 PROCEDURE — 92978 ENDOLUMINL IVUS OCT C 1ST: CPT | Mod: RC

## 2024-06-04 PROCEDURE — C1753: CPT

## 2024-06-04 PROCEDURE — C1761: CPT

## 2024-06-04 PROCEDURE — C1725: CPT

## 2024-06-04 PROCEDURE — C1887: CPT

## 2024-06-04 PROCEDURE — 80048 BASIC METABOLIC PNL TOTAL CA: CPT | Mod: 59

## 2024-06-04 PROCEDURE — C1894: CPT

## 2024-06-04 PROCEDURE — 36415 COLL VENOUS BLD VENIPUNCTURE: CPT

## 2024-06-04 PROCEDURE — 92928 PRQ TCAT PLMT NTRAC ST 1 LES: CPT | Mod: RC

## 2024-06-04 PROCEDURE — C1874: CPT

## 2024-06-04 PROCEDURE — 85027 COMPLETE CBC AUTOMATED: CPT

## 2024-06-04 RX ORDER — SODIUM CHLORIDE 9 MG/ML
250 INJECTION INTRAMUSCULAR; INTRAVENOUS; SUBCUTANEOUS ONCE
Refills: 0 | Status: DISCONTINUED | OUTPATIENT
Start: 2024-06-04 | End: 2024-06-04

## 2024-06-04 RX ORDER — SODIUM CHLORIDE 9 MG/ML
1000 INJECTION INTRAMUSCULAR; INTRAVENOUS; SUBCUTANEOUS
Refills: 0 | Status: DISCONTINUED | OUTPATIENT
Start: 2024-06-04 | End: 2024-06-04

## 2024-06-04 RX ORDER — NIFEDIPINE 30 MG
1 TABLET, EXTENDED RELEASE 24 HR ORAL
Refills: 0 | DISCHARGE

## 2024-06-04 RX ORDER — RIVAROXABAN 15 MG-20MG
1 KIT ORAL
Qty: 0 | Refills: 0 | DISCHARGE

## 2024-06-04 RX ORDER — METOPROLOL TARTRATE 50 MG
1 TABLET ORAL
Refills: 0 | DISCHARGE

## 2024-06-04 RX ORDER — ASPIRIN/CALCIUM CARB/MAGNESIUM 324 MG
1 TABLET ORAL
Refills: 0 | DISCHARGE

## 2024-06-04 RX ORDER — FUROSEMIDE 40 MG
1 TABLET ORAL
Refills: 0 | DISCHARGE

## 2024-06-04 RX ORDER — CLOPIDOGREL BISULFATE 75 MG/1
1 TABLET, FILM COATED ORAL
Qty: 30 | Refills: 3
Start: 2024-06-04 | End: 2024-10-01

## 2024-06-04 RX ORDER — OMEPRAZOLE 10 MG/1
1 CAPSULE, DELAYED RELEASE ORAL
Refills: 0 | DISCHARGE

## 2024-06-04 RX ORDER — ATORVASTATIN CALCIUM 80 MG/1
1 TABLET, FILM COATED ORAL
Refills: 0 | DISCHARGE

## 2024-06-04 RX ORDER — ASPIRIN/CALCIUM CARB/MAGNESIUM 324 MG
162 TABLET ORAL ONCE
Refills: 0 | Status: DISCONTINUED | OUTPATIENT
Start: 2024-06-04 | End: 2024-06-04

## 2024-06-04 RX ORDER — CLOPIDOGREL BISULFATE 75 MG/1
1 TABLET, FILM COATED ORAL
Refills: 0 | DISCHARGE

## 2024-06-04 RX ORDER — CHLORHEXIDINE GLUCONATE 213 G/1000ML
1 SOLUTION TOPICAL ONCE
Refills: 0 | Status: DISCONTINUED | OUTPATIENT
Start: 2024-06-04 | End: 2024-06-04

## 2024-06-04 RX ADMIN — SODIUM CHLORIDE 100 MILLILITER(S): 9 INJECTION INTRAMUSCULAR; INTRAVENOUS; SUBCUTANEOUS at 11:00

## 2024-06-04 NOTE — DISCHARGE NOTE NURSING/CASE MANAGEMENT/SOCIAL WORK - PATIENT PORTAL LINK FT
You can access the FollowMyHealth Patient Portal offered by [facility] by registering at the following website: [email or web address]. By joining [employer]’s FollowMyHealth portal, you will also be able to view your health information using other applications (apps) compatible with our system.

## 2024-06-04 NOTE — ASU PATIENT PROFILE, ADULT - FALL HARM RISK - UNIVERSAL INTERVENTIONS
Bed in lowest position, wheels locked, appropriate side rails in place/Call bell, personal items and telephone in reach/Instruct patient to call for assistance before getting out of bed or chair/Non-slip footwear when patient is out of bed/[place] to call system/Physically safe environment - no spills, clutter or unnecessary equipment/Purposeful Proactive Rounding/Room/bathroom lighting operational, light cord in reach

## 2024-06-04 NOTE — PROGRESS NOTE ADULT - SUBJECTIVE AND OBJECTIVE BOX
Interventional Cardiology Discharge Note:  -s/p PCI/SIXTO ostial RCA    DAVID PETERSON   69y Male    PAST MEDICAL & SURGICAL HISTORY:  HTN (hypertension)    Dyslipidemia    Factor V Leiden    DVT, lower extremity    COPD, moderate    H/O aortic valve disease    PAD (peripheral artery disease)    ANIKET (obstructive sleep apnea)  uses c pap    S/P AVR (aortic valve replacement)    Status post right partial knee replacement      HPI:    68 year M, current smoker, [employer], 9/11 exposure, with PMHx of HTN, HLD, ANIKET on CPAP, COPD, AVR 2017 (porcine), factor V Leiden, h/o RLE DVT (on Xarelto - last dose on 5/31/24), last hospitalization in 12/2023 for severe exacerbation of COPD, c/b by acute deccompensated heart failure, who presented to his cardiologist for follow up, and in need of pre-op cardiac clearance prior to total hip replacement surgery.  Pt does admit to [facility], on walking 1/2 to 1 block, relieved with rest.  Denies any chest pain, dizziness, n/v, diaphoresis, orthopnea, PND, LE edema, palpitations, syncope. Pt underwent a NST on 5/3/24 which revealed moderate to severe defect involving the inferior wall in the mid to distal inferolateral and apical segment, and partial reperfusion noted in the distal inferolateral and apical segments, EF 43%.   Pt is now referred for C with possible intervention if clinically indicated.   -Cath 6/4/24: PCI/SIXTO ostial RCA      Allergies    No Known Allergies      Patient seen and examined at bedside.   Patient without complaints.   Denies CP, SOB, palpitations, or dizziness    Vital Signs Last 24 Hrs  T(C): 37 (04 Jun 2024 07:02), Max: 37 (04 Jun 2024 07:02)  T(F): 98.6 (04 Jun 2024 07:02), Max: 98.6 (04 Jun 2024 07:02)  HR: 74 (04 Jun 2024 07:02) (74 - 74)  BP: 131/68 (04 Jun 2024 07:02) (131/68 - 131/68)  BP(mean): --  RR: 16 (04 Jun 2024 07:02) (16 - 16)  SpO2: 96% (04 Jun 2024 07:02) (96% - 96%)    MEDICATIONS  (STANDING):  chlorhexidine 4% Liquid 1 Application(s) Topical once  sodium chloride 0.9%. 1000 milliLiter(s) (100 mL/Hr) IV Continuous <Continuous>      REVIEW OF SYSTEMS:          All negative except as mentioned in HPI    PHYSICAL EXAM:           CONSTITUTIONAL: Well-developed; well-nourished; in no acute distress  	SKIN: warm, dry  	HEAD: Normocephalic; atraumatic  	EYES: PERRL.  	ENT: No nasal discharge, airway clear, mucous membranes moist  	NECK: Supple; non tender.  	CARD: +S1, +S2, no murmurs, gallops, or rubs. Regular rate and rhythm    	RESP: No wheezes, rales or rhonchi. CTA B/L  	ABD: soft ntnd, + BS x 4 quadrants  	EXT: moves all extremities,  no clubbing, cyanosis or edema  	NEURO: Alert and oriented x3, no focal deficits          PSYCH: Cooperative, appropriate          VASCULAR:  + Rad / + PTs / + DPs          EXTREMITY:  	   Right Radial: D-stat in place, acceess site soft, no hematoma, no pain, + pulses, no sign of infection, no numbness            ECG: pending                                                                                                                LABS:                        13.4   5.88  )-----------( 158      ( 04 Jun 2024 06:51 )             40.4     06-04    139  |  104  |  24<H>  ----------------------------<  102<H>  4.4   |  24  |  1.3    Ca    9.0      04 Jun 2024 06:51      A/P:  I discussed the case with Cardiologist Dr. Torres and recommend the following:    S/P PCI:    PCI-SIXTO ostial RCA    	         Continue DAPT ( Aspirin 81 mg PO Daily and Plavix 75mg po daily),  B-Blocker, CCB, Statin Therapy, ARB                   Patient given 30 day supply of ( Aspirin 81 mg daily and Plavix 75 mg daily ) to take at home                   Triple therapy with ASa/Plavix/Xarelto for 4 wks, then stop ASA, and continue only Plavix and Xarelto, due to increased risk of bleeding                   **GI prophylaxis with Omeprazole (home med)                   CBC @ 14:30                   EKG prior to d/c @ 14:30                   NS @ 100 ml/hr x 6hrs                   **OOB to chair/Ambulate with assistance                   Monitor access site/ distal pulses                   Patient agreeing to take DAPT for at least one year or as directed by cardiologist                    Pt given instructions on importance of taking antiplatelet medication or risk acute stent thrombosis/death                   Post cath instructions, access site care and activity restrictions reviewed with patient                     Discussed with patient to return to hospital if experience chest pain, shortness breath, dizziness and site bleeding                   Aggressive risk factor modification, diet counseling, smoking cessation discussed with patient                    Benefits of cardiac rehab discussed with patient.                   Cardiac Rehab info provided /Referral and communication to cardiac rehab completed.                   Patient instructed to call cardiac rehab and make initial appointment after first follow-up visit with Cardiologist                    Can discharge patient @ 16:30 from cardiac standpoint after ambulating without symptoms, access site wnl, labs and ECG reviewed                    Follow up with Cardiology Dr. Chairez  in two weeks.  Instructed to call and make an appointment                                       Interventional Cardiology Discharge Note:  -s/p PCI/SIXTO ostial RCA    DAVID PETERSON   69y Male    PAST MEDICAL & SURGICAL HISTORY:  HTN (hypertension)    Dyslipidemia    Factor V Leiden    DVT, lower extremity    COPD, moderate    H/O aortic valve disease    PAD (peripheral artery disease)    ANIKET (obstructive sleep apnea)  uses c pap    S/P AVR (aortic valve replacement)    Status post right partial knee replacement      HPI:    68 year M, current smoker, [employer], 9/11 exposure, with PMHx of HTN, HLD, ANIKET on CPAP, COPD, AVR 2017 (porcine), factor V Leiden, h/o RLE DVT (on Xarelto - last dose on 5/31/24), last hospitalization in 12/2023 for severe exacerbation of COPD, c/b by acute deccompensated heart failure, who presented to his cardiologist for follow up, and in need of pre-op cardiac clearance prior to total hip replacement surgery.  Pt does admit to [facility], on walking 1/2 to 1 block, relieved with rest.  Denies any chest pain, dizziness, n/v, diaphoresis, orthopnea, PND, LE edema, palpitations, syncope. Pt underwent a NST on 5/3/24 which revealed moderate to severe defect involving the inferior wall in the mid to distal inferolateral and apical segment, and partial reperfusion noted in the distal inferolateral and apical segments, EF 43%.   Pt is now referred for C with possible intervention if clinically indicated.   -Cath 6/4/24: PCI/SIXTO ostial RCA      Allergies    No Known Allergies      Patient seen and examined at bedside.   Patient without complaints.   Denies CP, SOB, palpitations, or dizziness    Vital Signs Last 24 Hrs  T(C): 37 (04 Jun 2024 07:02), Max: 37 (04 Jun 2024 07:02)  T(F): 98.6 (04 Jun 2024 07:02), Max: 98.6 (04 Jun 2024 07:02)  HR: 74 (04 Jun 2024 07:02) (74 - 74)  BP: 131/68 (04 Jun 2024 07:02) (131/68 - 131/68)  BP(mean): --  RR: 16 (04 Jun 2024 07:02) (16 - 16)  SpO2: 96% (04 Jun 2024 07:02) (96% - 96%)    MEDICATIONS  (STANDING):  chlorhexidine 4% Liquid 1 Application(s) Topical once  sodium chloride 0.9%. 1000 milliLiter(s) (100 mL/Hr) IV Continuous <Continuous>      REVIEW OF SYSTEMS:          All negative except as mentioned in HPI    PHYSICAL EXAM:           CONSTITUTIONAL: Well-developed; well-nourished; in no acute distress  	SKIN: warm, dry  	HEAD: Normocephalic; atraumatic  	EYES: PERRL.  	ENT: No nasal discharge, airway clear, mucous membranes moist  	NECK: Supple; non tender.  	CARD: +S1, +S2, no murmurs, gallops, or rubs. Regular rate and rhythm    	RESP: No wheezes, rales or rhonchi. CTA B/L  	ABD: soft ntnd, + BS x 4 quadrants  	EXT: moves all extremities,  no clubbing, cyanosis or edema  	NEURO: Alert and oriented x3, no focal deficits          PSYCH: Cooperative, appropriate          VASCULAR:  + Rad / + PTs / + DPs          EXTREMITY:  	   Right Radial: D-stat in place, acceess site soft, no hematoma, no pain, + pulses, no sign of infection, no numbness            ECG: pending                                                                                                                LABS:                        13.4   5.88  )-----------( 158      ( 04 Jun 2024 06:51 )             40.4     06-04    139  |  104  |  24<H>  ----------------------------<  102<H>  4.4   |  24  |  1.3    Ca    9.0      04 Jun 2024 06:51      A/P:  I discussed the case with Cardiologist Dr. Torres and recommend the following:    S/P PCI:    Intervention:   POBA  SIXTO PCI  IVUS  Lithotripsy  Implants:    SYNERGY MEGATRON 4.0X24 to ostial RCA (AUC 7)      	         Continue DAPT ( Aspirin 81 mg PO Daily and Plavix 75mg po daily),  B-Blocker, CCB, Statin Therapy, ARB                   Patient given 30 day supply of ( Aspirin 81 mg daily and Plavix 75 mg daily ) to take at home                   Triple therapy with ASa/Plavix/Xarelto for 4 wks, then stop ASA, and continue only Plavix and Xarelto, due to increased risk of bleeding                   **GI prophylaxis with Omeprazole (home med)                   CBC @ 14:30                   EKG prior to d/c @ 14:30                   NS @ 100 ml/hr x 6hrs                   **OOB to chair/Ambulate with assistance                   Monitor access site/ distal pulses                   Patient agreeing to take DAPT for at least one year or as directed by cardiologist                    Pt given instructions on importance of taking antiplatelet medication or risk acute stent thrombosis/death                   Post cath instructions, access site care and activity restrictions reviewed with patient                     Discussed with patient to return to hospital if experience chest pain, shortness breath, dizziness and site bleeding                   Aggressive risk factor modification, diet counseling, smoking cessation discussed with patient                    Benefits of cardiac rehab discussed with patient.                   Cardiac Rehab info provided /Referral and communication to cardiac rehab completed.                   Patient instructed to call cardiac rehab and make initial appointment after first follow-up visit with Cardiologist                    Can discharge patient @ 16:30 from cardiac standpoint after ambulating without symptoms, access site wnl, labs and ECG reviewed                    Follow up with Cardiology Dr. Chairez  in two weeks.  Instructed to call and make an appointment

## 2024-06-04 NOTE — DISCHARGE NOTE NURSING/CASE MANAGEMENT/SOCIAL WORK - NSDCPEFALRISK_GEN_ALL_CORE
For information on Fall & Injury Prevention, visit: [email or web address] OR  [email or web address] OR  https://www.cdc.gov/steadi/patient.html

## 2024-06-04 NOTE — ASU DISCHARGE PLAN (ADULT/PEDIATRIC) - NS MD DC FALL RISK RISK
For information on Fall & Injury Prevention, visit: [email or web address] OR  [email or web address] OR  https://www.cdc.gov/steadi/patient.html

## 2024-06-04 NOTE — ASU DISCHARGE PLAN (ADULT/PEDIATRIC) - ASU DC SPECIAL INSTRUCTIONSFT
Discharge Instructions as follows:  - Continue medical regimen as prescribed to prevent chest pain.  - Continue beta blocker, Statin, CCB, and ARB  -Resume Xarelto tomorrow 6/5 AM  -Continue triple therapy with ASA, Plavix, and Xarelto for 4 weeks, then STOP ASA, and continue Plavix and Xarelto only  - Instructed to call 911 if chest pain, shortness of breath or bleeding from access site.  - No heavy lifting > 10lbs x 1 week.  - No driving x 24 hours.  - No baths, swimming pools x 1 week, may shower  - Low sodium low fat low cholesterol diet  - Follow-up with Cardiologist in 1-2 weeks after discharge  - Soreness or tenderness at the site is possible, it will diminish over time. You may take Tylenol every 4-6 hours as needed. Nothing stronger is needed. NO Motrin/Ibuprofen  - Any questions call cardiac cath lab [phone]

## 2024-06-04 NOTE — DISCHARGE NOTE NURSING/CASE MANAGEMENT/SOCIAL WORK - NSDCVIVACCINE_GEN_ALL_CORE_FT
Tdap; 03-Sep-2019 14:09; Dania Gottlieb (RN); [employer]; [id number] (Exp. Date: 04-Jul-2021); IntraMuscular; Deltoid Left.; 0.5 milliLiter(s); VIS (VIS Published: 09-May-2013, VIS Presented: 03-Sep-2019);

## 2024-06-04 NOTE — CHART NOTE - NSCHARTNOTEFT_GEN_A_CORE
PRE-OP DIAGNOSIS:  Positive Stress test      PROCEDURE:     [X] Coronary Angiogram     [] LHC     [] LVG     [] RHC     [X] Intervention (see below)         PHYSICIAN:  Dr. Torres    ASSISTANT:  Dr. Bonilla       PROCEDURE DESCRIPTION:     Consent:      [X] Patient     [] Family Member     []  Used        Anesthesia:     [X] General     [] Sedation     [X] Local        Access & Closure:     [X] 6 Fr R Radial Artery     [] Fr Femoral Artery     [] Fr Femoral Vein     [] Fr Brachial Vein       IV Contrast: 200 mL        Intervention:   POBA  SIXTO PCI  IVUS  Lithotripsy    Implants:    SYNERGY MEGATRON 4.0X24 to ostial RCA (AUC 7)    FINDINGS:     Coronary Dominance: Right dominate      LM: No significant disease    LAD: No significant disease    CX: No significant disease    RCA: 90% ostial RCA disease s/p PCI. Distal Vessels RPDA and RPL receives collaterals from L system     ESTIMATED BLOOD LOSS: < 10 mL        CONDITION:     [X] Good     [] Fair     [] Critical        SPECIMEN REMOVED: N/A       POST-OP DIAGNOSIS:      [] Normal Coronary Angiogram     [] Mild Coronary Artery Disease (< 50% stenosis)     [X] 1 Vessel Coronary Artery Disease RCA s/p PCI       PLAN OF CARE:     [X] D/C Home Today     [] Return to In-patient bed     [] Admit for observation     [] Return for Staged Procedure     [] CT Surgery Consult     [X] Medications: Triple therapy Aspirin, Plavix and Xarelto for 4 weeks then Stop aspirin    [X] IV Fluids: NS 100cc/hr x 6hrs

## 2024-06-04 NOTE — ASU PATIENT PROFILE, ADULT - PAIN, CHRONIC: FACTORS THAT AGGRAVATE, PROFILE
Received Choice form at 6682 on 3/21/23  Agency/Facility Name: [facility]  Referral sent per Choice form @ [id number]      activity

## 2024-10-22 ENCOUNTER — APPOINTMENT (OUTPATIENT)
Age: 69
End: 2024-10-22

## 2024-11-03 NOTE — H&P PST ADULT - HISTORY OF PRESENT ILLNESS
Yes Pt endorses for many years he has had issues with circulation of left leg. Complains of occasional pain with ambulation. Denies any other symptoms. Advised to proceed with above.    Denies any chest pain, difficulty breathing, SOB, palpitations, dysuria, URI, or any other infections in the last 2 weeks. Denies any suicidal or homicidal ideations. ANIKET reviewed with patient.     Endorses this is their full medical & surgical history including medications prescribed. Pt verbalized understanding of all pre-operative instructions and was given the opportunity to ask questions and have them answered. They were instructed to follow up with their surgeon/proceduralists office with any additional questions regarding their procedure.    Anesthesia Alert  YES--Difficult Airway: Class IV  NO--History of neck surgery or radiation  NO--Limited ROM of neck  NO--History of Malignant hyperthermia  NO--No personal or family history of Pseudocholinesterase deficiency.  NO--Prior Anesthesia Complication  NO--Latex Allergy  NO--Loose teeth  NO--History of Rheumatoid Arthritis  YES--ANIKET  YES--Bleeding Risk  NO--Other_____    Revised Cardiac Risk Index for Pre-Operative Risk  RESULT SUMMARY:  0 points  Class I Risk    3.9 %  30-day risk of death, MI, or cardiac arrest    From Duceppe 2017. These numbers are higher than those from the original study (Rickey 1999). See Evidence for details.    INPUTS:  Elevated-risk surgery —> 0 = No  History of ischemic heart disease —> 0 = No  History of congestive heart failure —> 0 = No  History of cerebrovascular disease —> 0 = No  Pre-operative treatment with insulin —> 0 = No  Pre-operative creatinine >2 mg/dL / 176.8 µmol/L —> 0 = No    [facility] Activity Status Index (DASI)   RESULT SUMMARY:  42.7 points  The higher the score (maximum 58.2), the higher the functional status.    7.99 METs    INPUTS:  Take care of self —> 2.75 = Yes  Walk indoors —> 1.75 = Yes  Walk 1&ndash;2 blocks on level ground —> 2.75 = Yes  Climb a flight of stairs or walk up a hill —> 5.5 = Yes  Run a short distance —> 0 = No  Do light work around the house —> 2.7 = Yes  Do moderate work around the house —> 3.5 = Yes  Do heavy work around the house —> 8 = Yes  Do yardwork —> 4.5 = Yes  Have sexual relations —> 5.25 = Yes  Participate in moderate recreational activities —> 6 = Yes  Participate in strenuous sports —> 0 = No

## 2024-11-05 ENCOUNTER — APPOINTMENT (OUTPATIENT)
Dept: SURGERY | Facility: CLINIC | Age: 69
End: 2024-11-05
Payer: MEDICARE

## 2024-11-05 VITALS — HEIGHT: 70 IN | BODY MASS INDEX: 29.35 KG/M2 | WEIGHT: 205 LBS

## 2024-11-05 DIAGNOSIS — K40.90 UNILATERAL INGUINAL HERNIA, W/OUT OBSTRUCTION OR GANGRENE, NOT SPECIFIED AS RECURRENT: ICD-10-CM

## 2024-11-05 DIAGNOSIS — N43.0 ENCYSTED HYDROCELE: ICD-10-CM

## 2024-11-05 PROCEDURE — 99203 OFFICE O/P NEW LOW 30 MIN: CPT

## 2024-11-15 ENCOUNTER — APPOINTMENT (OUTPATIENT)
Dept: PULMONOLOGY | Facility: CLINIC | Age: 69
End: 2024-11-15
Payer: MEDICARE

## 2024-11-15 VITALS
BODY MASS INDEX: 29.06 KG/M2 | SYSTOLIC BLOOD PRESSURE: 124 MMHG | HEART RATE: 82 BPM | HEIGHT: 70 IN | OXYGEN SATURATION: 99 % | WEIGHT: 203 LBS | RESPIRATION RATE: 14 BRPM | DIASTOLIC BLOOD PRESSURE: 72 MMHG

## 2024-11-15 DIAGNOSIS — J43.2 CENTRILOBULAR EMPHYSEMA: ICD-10-CM

## 2024-11-15 DIAGNOSIS — F17.210 NICOTINE DEPENDENCE, CIGARETTES, UNCOMPLICATED: ICD-10-CM

## 2024-11-15 PROCEDURE — 99214 OFFICE O/P EST MOD 30 MIN: CPT | Mod: 25

## 2024-11-15 PROCEDURE — G2211 COMPLEX E/M VISIT ADD ON: CPT

## 2024-11-15 PROCEDURE — 99406 BEHAV CHNG SMOKING 3-10 MIN: CPT

## 2024-11-15 PROCEDURE — G0296 VISIT TO DETERM LDCT ELIG: CPT

## 2024-11-15 RX ORDER — VARENICLINE TARTRATE 1 MG/561
1 TABLET, FILM COATED ORAL TWICE DAILY
Qty: 2 | Refills: 2 | Status: ACTIVE | COMMUNITY
Start: 2024-11-15 | End: 1900-01-01

## 2024-11-15 RX ORDER — VARENICLINE TARTRATE 0.5 (11)-1
0.5 MG X 11 & KIT ORAL
Qty: 1 | Refills: 0 | Status: ACTIVE | COMMUNITY
Start: 2024-11-15 | End: 1900-01-01

## 2024-11-22 ENCOUNTER — RESULT REVIEW (OUTPATIENT)
Age: 69
End: 2024-11-22

## 2024-11-22 ENCOUNTER — OUTPATIENT (OUTPATIENT)
Dept: OUTPATIENT SERVICES | Facility: HOSPITAL | Age: 69
LOS: 1 days | End: 2024-11-22
Payer: MEDICARE

## 2024-11-22 VITALS
DIASTOLIC BLOOD PRESSURE: 71 MMHG | TEMPERATURE: 98 F | OXYGEN SATURATION: 98 % | WEIGHT: 201.06 LBS | HEIGHT: 70 IN | SYSTOLIC BLOOD PRESSURE: 156 MMHG | RESPIRATION RATE: 16 BRPM | HEART RATE: 86 BPM

## 2024-11-22 DIAGNOSIS — Z01.818 ENCOUNTER FOR OTHER PREPROCEDURAL EXAMINATION: ICD-10-CM

## 2024-11-22 DIAGNOSIS — N43.0 ENCYSTED HYDROCELE: ICD-10-CM

## 2024-11-22 DIAGNOSIS — Z95.820 PERIPHERAL VASCULAR ANGIOPLASTY STATUS WITH IMPLANTS AND GRAFTS: Chronic | ICD-10-CM

## 2024-11-22 DIAGNOSIS — Z95.2 PRESENCE OF PROSTHETIC HEART VALVE: Chronic | ICD-10-CM

## 2024-11-22 DIAGNOSIS — Z96.651 PRESENCE OF RIGHT ARTIFICIAL KNEE JOINT: Chronic | ICD-10-CM

## 2024-11-22 DIAGNOSIS — K40.90 UNILATERAL INGUINAL HERNIA, WITHOUT OBSTRUCTION OR GANGRENE, NOT SPECIFIED AS RECURRENT: ICD-10-CM

## 2024-11-22 LAB
ALBUMIN SERPL ELPH-MCNC: 4.1 G/DL — SIGNIFICANT CHANGE UP (ref 3.5–5.2)
ALP SERPL-CCNC: 95 U/L — SIGNIFICANT CHANGE UP (ref 30–115)
ALT FLD-CCNC: 14 U/L — SIGNIFICANT CHANGE UP (ref 0–41)
ANION GAP SERPL CALC-SCNC: 11 MMOL/L — SIGNIFICANT CHANGE UP (ref 7–14)
APPEARANCE UR: CLEAR — SIGNIFICANT CHANGE UP
APTT BLD: 38.6 SEC — SIGNIFICANT CHANGE UP (ref 27–39.2)
AST SERPL-CCNC: 20 U/L — SIGNIFICANT CHANGE UP (ref 0–41)
BACTERIA # UR AUTO: NEGATIVE /HPF — SIGNIFICANT CHANGE UP
BASOPHILS # BLD AUTO: 0.1 K/UL — SIGNIFICANT CHANGE UP (ref 0–0.2)
BASOPHILS NFR BLD AUTO: 1.7 % — HIGH (ref 0–1)
BILIRUB SERPL-MCNC: 0.2 MG/DL — SIGNIFICANT CHANGE UP (ref 0.2–1.2)
BILIRUB UR-MCNC: NEGATIVE — SIGNIFICANT CHANGE UP
BUN SERPL-MCNC: 17 MG/DL — SIGNIFICANT CHANGE UP (ref 10–20)
CALCIUM SERPL-MCNC: 9.4 MG/DL — SIGNIFICANT CHANGE UP (ref 8.4–10.5)
CAST: 2 /LPF — SIGNIFICANT CHANGE UP (ref 0–4)
CHLORIDE SERPL-SCNC: 105 MMOL/L — SIGNIFICANT CHANGE UP (ref 98–110)
CO2 SERPL-SCNC: 27 MMOL/L — SIGNIFICANT CHANGE UP (ref 17–32)
COLOR SPEC: YELLOW — SIGNIFICANT CHANGE UP
CREAT SERPL-MCNC: 1 MG/DL — SIGNIFICANT CHANGE UP (ref 0.7–1.5)
DIFF PNL FLD: ABNORMAL
EGFR: 81 ML/MIN/1.73M2 — SIGNIFICANT CHANGE UP
EOSINOPHIL # BLD AUTO: 0.13 K/UL — SIGNIFICANT CHANGE UP (ref 0–0.7)
EOSINOPHIL NFR BLD AUTO: 2.3 % — SIGNIFICANT CHANGE UP (ref 0–8)
GLUCOSE SERPL-MCNC: 84 MG/DL — SIGNIFICANT CHANGE UP (ref 70–99)
GLUCOSE UR QL: NEGATIVE MG/DL — SIGNIFICANT CHANGE UP
HCT VFR BLD CALC: 43 % — SIGNIFICANT CHANGE UP (ref 42–52)
HGB BLD-MCNC: 14.4 G/DL — SIGNIFICANT CHANGE UP (ref 14–18)
IMM GRANULOCYTES NFR BLD AUTO: 0.5 % — HIGH (ref 0.1–0.3)
INR BLD: 1.13 RATIO — SIGNIFICANT CHANGE UP (ref 0.65–1.3)
KETONES UR-MCNC: ABNORMAL MG/DL
LEUKOCYTE ESTERASE UR-ACNC: NEGATIVE — SIGNIFICANT CHANGE UP
LYMPHOCYTES # BLD AUTO: 1.29 K/UL — SIGNIFICANT CHANGE UP (ref 1.2–3.4)
LYMPHOCYTES # BLD AUTO: 22.4 % — SIGNIFICANT CHANGE UP (ref 20.5–51.1)
MCHC RBC-ENTMCNC: 32.9 PG — HIGH (ref 27–31)
MCHC RBC-ENTMCNC: 33.5 G/DL — SIGNIFICANT CHANGE UP (ref 32–37)
MCV RBC AUTO: 98.2 FL — HIGH (ref 80–94)
MONOCYTES # BLD AUTO: 0.6 K/UL — SIGNIFICANT CHANGE UP (ref 0.1–0.6)
MONOCYTES NFR BLD AUTO: 10.4 % — HIGH (ref 1.7–9.3)
MRSA PCR RESULT.: NEGATIVE — SIGNIFICANT CHANGE UP
NEUTROPHILS # BLD AUTO: 3.6 K/UL — SIGNIFICANT CHANGE UP (ref 1.4–6.5)
NEUTROPHILS NFR BLD AUTO: 62.7 % — SIGNIFICANT CHANGE UP (ref 42.2–75.2)
NITRITE UR-MCNC: NEGATIVE — SIGNIFICANT CHANGE UP
NRBC # BLD: 0 /100 WBCS — SIGNIFICANT CHANGE UP (ref 0–0)
PH UR: 6 — SIGNIFICANT CHANGE UP (ref 5–8)
PLATELET # BLD AUTO: 160 K/UL — SIGNIFICANT CHANGE UP (ref 130–400)
PMV BLD: 9.9 FL — SIGNIFICANT CHANGE UP (ref 7.4–10.4)
POTASSIUM SERPL-MCNC: 3.9 MMOL/L — SIGNIFICANT CHANGE UP (ref 3.5–5)
POTASSIUM SERPL-SCNC: 3.9 MMOL/L — SIGNIFICANT CHANGE UP (ref 3.5–5)
PROT SERPL-MCNC: 6.7 G/DL — SIGNIFICANT CHANGE UP (ref 6–8)
PROT UR-MCNC: >=1000 MG/DL
PROTHROM AB SERPL-ACNC: 13.4 SEC — HIGH (ref 9.95–12.87)
RBC # BLD: 4.38 M/UL — LOW (ref 4.7–6.1)
RBC # FLD: 14.3 % — SIGNIFICANT CHANGE UP (ref 11.5–14.5)
RBC CASTS # UR COMP ASSIST: 10 /HPF — HIGH (ref 0–4)
SODIUM SERPL-SCNC: 143 MMOL/L — SIGNIFICANT CHANGE UP (ref 135–146)
SP GR SPEC: 1.03 — SIGNIFICANT CHANGE UP (ref 1–1.03)
SQUAMOUS # UR AUTO: 1 /HPF — SIGNIFICANT CHANGE UP (ref 0–5)
UROBILINOGEN FLD QL: 1 MG/DL — SIGNIFICANT CHANGE UP (ref 0.2–1)
WBC # BLD: 5.75 K/UL — SIGNIFICANT CHANGE UP (ref 4.8–10.8)
WBC # FLD AUTO: 5.75 K/UL — SIGNIFICANT CHANGE UP (ref 4.8–10.8)
WBC UR QL: 1 /HPF — SIGNIFICANT CHANGE UP (ref 0–5)

## 2024-11-22 PROCEDURE — 99214 OFFICE O/P EST MOD 30 MIN: CPT | Mod: 25

## 2024-11-22 PROCEDURE — 87641 MR-STAPH DNA AMP PROBE: CPT

## 2024-11-22 PROCEDURE — 85610 PROTHROMBIN TIME: CPT

## 2024-11-22 PROCEDURE — 71046 X-RAY EXAM CHEST 2 VIEWS: CPT | Mod: 26

## 2024-11-22 PROCEDURE — 93005 ELECTROCARDIOGRAM TRACING: CPT

## 2024-11-22 PROCEDURE — 80053 COMPREHEN METABOLIC PANEL: CPT

## 2024-11-22 PROCEDURE — 36415 COLL VENOUS BLD VENIPUNCTURE: CPT

## 2024-11-22 PROCEDURE — 81001 URINALYSIS AUTO W/SCOPE: CPT

## 2024-11-22 PROCEDURE — 85025 COMPLETE CBC W/AUTO DIFF WBC: CPT

## 2024-11-22 PROCEDURE — 87640 STAPH A DNA AMP PROBE: CPT

## 2024-11-22 PROCEDURE — 85730 THROMBOPLASTIN TIME PARTIAL: CPT

## 2024-11-22 PROCEDURE — 71046 X-RAY EXAM CHEST 2 VIEWS: CPT

## 2024-11-22 PROCEDURE — 93010 ELECTROCARDIOGRAM REPORT: CPT

## 2024-11-22 NOTE — H&P PST ADULT - HISTORY OF PRESENT ILLNESS
See note copied from mothers chart. Patient is a 69 year old  male presenting to PAST in preparation for RIGHT INGUINAL HERNIA REPAIR WITH MESH AND RIGHT HYDROCELECTOMY  on  12- under  Local Standby anesthesia by Dr. Lars Brown .    Patient reports first noticing right testicular 1 month ago, went to Urgent Care and was diagnosed with a hernia.  Denies changes in bowel or bladder habits.      PATIENT CURRENTLY DENIES CHEST PAIN  SHORTNESS OF BREATH  PALPITATIONS,  DYSURIA, OR UPPER RESPIRATORY INFECTION IN PAST 2 WEEKS    denies travel outside the [place] in the past 30 days    Anesthesia Alert  YES--Difficult Airway CALL 4 AIRWAY  NO--History of neck surgery or radiation  NO--Limited ROM of neck  NO--History of Malignant hyperthermia  NO--No personal or family history of Pseudocholinesterase deficiency.  NO--Prior Anesthesia Complication  NO--Latex Allergy  NO--Loose teeth  NO--History of Rheumatoid Arthritis  YES--Bleeding risk- ON PLAVIX AND XARELTO  YES--ANIKET- USES CPAP  YES--Factor V Leiden    PT  DENIES ANY RASHES, ABRASION, OR OPEN WOUNDS OR CUTS    AS PER THE PATIENT, THIS IS HIS/HER COMPLETE MEDICAL AND SURGICAL HX, INCLUDING MEDICATIONS PRESCRIBED AND OVER THE COUNTER    Patient  communicated understanding of instructions and was given the opportunity to ask questions and have them answered.    pt denies any suicidal ideation or thoughts, pt states not a threat to self or others    Revised Cardiac Risk Index for Pre-Operative Risk from [email or web address]  on 11/22/2024  ** All calculations should be rechecked by clinician prior to use **    RESULT SUMMARY:  0 points  RCRI Score    3.9 %  Risk of major cardiac event      INPUTS:  High-risk surgery —> 0 = No  History of ischemic heart disease —> 0 = No  History of congestive heart failure —> 0 = No  History of cerebrovascular disease —> 0 = No  Pre-operative treatment with insulin —> 0 = No  Pre-operative creatinine >2 mg/dL / 176.8 µmol/L —> 0 = No     Patient is a 69 year old  male presenting to PAST in preparation for RIGHT INGUINAL HERNIA REPAIR WITH MESH AND RIGHT HYDROCELECTOMY  on  12- under  Local Standby anesthesia by Dr. Lars Brown .    Patient reports first noticing right testicular 1 month ago, went to Urgent Care and was diagnosed with a hernia.  Denies changes in bowel or bladder habits.      PATIENT CURRENTLY DENIES CHEST PAIN  SHORTNESS OF BREATH  PALPITATIONS,  DYSURIA, OR UPPER RESPIRATORY INFECTION IN PAST 2 WEEKS    denies travel outside the [place] in the past 30 days    Anesthesia Alert  YES--Difficult Airway CLASS 4 AIRWAY  NO--History of neck surgery or radiation  NO--Limited ROM of neck  NO--History of Malignant hyperthermia  NO--No personal or family history of Pseudocholinesterase deficiency.  NO--Prior Anesthesia Complication  NO--Latex Allergy  NO--Loose teeth  NO--History of Rheumatoid Arthritis  YES--Bleeding risk- ON PLAVIX AND XARELTO  YES--ANIKET- USES CPAP  YES--Factor V Leiden    PT  DENIES ANY RASHES, ABRASION, OR OPEN WOUNDS OR CUTS    AS PER THE PATIENT, THIS IS HIS/HER COMPLETE MEDICAL AND SURGICAL HX, INCLUDING MEDICATIONS PRESCRIBED AND OVER THE COUNTER    Patient  communicated understanding of instructions and was given the opportunity to ask questions and have them answered.    pt denies any suicidal ideation or thoughts, pt states not a threat to self or others    Revised Cardiac Risk Index for Pre-Operative Risk from [email or web address]  on 11/22/2024  ** All calculations should be rechecked by clinician prior to use **    RESULT SUMMARY:  0 points  RCRI Score    3.9 %  Risk of major cardiac event      INPUTS:  High-risk surgery —> 0 = No  History of ischemic heart disease —> 0 = No  History of congestive heart failure —> 0 = No  History of cerebrovascular disease —> 0 = No  Pre-operative treatment with insulin —> 0 = No  Pre-operative creatinine >2 mg/dL / 176.8 µmol/L —> 0 = No

## 2024-11-22 NOTE — H&P PST ADULT - RESPIRATORY
no wheezes/no rales/no respiratory distress/no use of accessory muscles/airway patent/breath sounds equal/rhonchi

## 2024-11-22 NOTE — H&P PST ADULT - NSICDXPASTSURGICALHX_GEN_ALL_CORE_FT
PAST SURGICAL HISTORY:  S/P angioplasty with stent     S/P AVR (aortic valve replacement)     Status post right partial knee replacement

## 2024-11-22 NOTE — H&P PST ADULT - REASON FOR ADMISSION
Case Type: OP Block TimeSuite: NIKIroceduralist: Lars Brown  Confirmed Surgery DateTime: 12- - 0:00PAST DateTime: 11- - 15:15Procedure: RIGHT INGUINAL HERNIA REPAIR WITH MESH AND RIGHT HYDROCELECTOMY  ERP?: UnavailableLaterality: RightLength of Procedure: 75 Minutes  Anesthesia Type: Local Standby

## 2024-11-22 NOTE — H&P PST ADULT - NSICDXPASTMEDICALHX_GEN_ALL_CORE_FT
PAST MEDICAL HISTORY:  COPD, moderate     Current smoker     DVT, lower extremity     Dyslipidemia     ETOH abuse     Factor V Leiden     H/O aortic valve disease     HTN (hypertension)     ANIKET (obstructive sleep apnea) uses c pap    PAD (peripheral artery disease)

## 2024-11-23 DIAGNOSIS — K40.90 UNILATERAL INGUINAL HERNIA, WITHOUT OBSTRUCTION OR GANGRENE, NOT SPECIFIED AS RECURRENT: ICD-10-CM

## 2024-11-23 DIAGNOSIS — Z01.818 ENCOUNTER FOR OTHER PREPROCEDURAL EXAMINATION: ICD-10-CM

## 2024-12-04 ENCOUNTER — NON-APPOINTMENT (OUTPATIENT)
Age: 69
End: 2024-12-04

## 2024-12-04 RX ORDER — TRAMADOL HYDROCHLORIDE 50 MG/1
50 TABLET, COATED ORAL
Qty: 12 | Refills: 0 | Status: ACTIVE | COMMUNITY
Start: 2024-12-04 | End: 1900-01-01

## 2024-12-06 ENCOUNTER — RESULT REVIEW (OUTPATIENT)
Age: 69
End: 2024-12-06

## 2024-12-06 ENCOUNTER — APPOINTMENT (OUTPATIENT)
Dept: SURGERY | Facility: AMBULATORY SURGERY CENTER | Age: 69
End: 2024-12-06
Payer: MEDICARE

## 2024-12-06 ENCOUNTER — OUTPATIENT (OUTPATIENT)
Dept: OUTPATIENT SERVICES | Facility: HOSPITAL | Age: 69
LOS: 1 days | Discharge: HOME | End: 2024-12-06
Payer: MEDICARE

## 2024-12-06 ENCOUNTER — TRANSCRIPTION ENCOUNTER (OUTPATIENT)
Age: 69
End: 2024-12-06

## 2024-12-06 VITALS
DIASTOLIC BLOOD PRESSURE: 69 MMHG | RESPIRATION RATE: 20 BRPM | HEIGHT: 70 IN | WEIGHT: 201.06 LBS | TEMPERATURE: 98 F | OXYGEN SATURATION: 98 % | HEART RATE: 78 BPM | SYSTOLIC BLOOD PRESSURE: 129 MMHG

## 2024-12-06 VITALS
OXYGEN SATURATION: 95 % | HEART RATE: 76 BPM | RESPIRATION RATE: 19 BRPM | SYSTOLIC BLOOD PRESSURE: 148 MMHG | DIASTOLIC BLOOD PRESSURE: 889 MMHG

## 2024-12-06 DIAGNOSIS — N43.0 ENCYSTED HYDROCELE: ICD-10-CM

## 2024-12-06 DIAGNOSIS — Z96.651 PRESENCE OF RIGHT ARTIFICIAL KNEE JOINT: Chronic | ICD-10-CM

## 2024-12-06 DIAGNOSIS — Z95.2 PRESENCE OF PROSTHETIC HEART VALVE: Chronic | ICD-10-CM

## 2024-12-06 DIAGNOSIS — K40.90 UNILATERAL INGUINAL HERNIA, WITHOUT OBSTRUCTION OR GANGRENE, NOT SPECIFIED AS RECURRENT: ICD-10-CM

## 2024-12-06 DIAGNOSIS — Z95.820 PERIPHERAL VASCULAR ANGIOPLASTY STATUS WITH IMPLANTS AND GRAFTS: Chronic | ICD-10-CM

## 2024-12-06 PROCEDURE — 55040 REMOVAL OF HYDROCELE: CPT | Mod: RT,XS

## 2024-12-06 PROCEDURE — 49505 PRP I/HERN INIT REDUC >5 YR: CPT | Mod: RT

## 2024-12-06 PROCEDURE — 88304 TISSUE EXAM BY PATHOLOGIST: CPT

## 2024-12-06 PROCEDURE — 88304 TISSUE EXAM BY PATHOLOGIST: CPT | Mod: 26

## 2024-12-06 PROCEDURE — C1781: CPT

## 2024-12-06 RX ORDER — HYDROMORPHONE HYDROCHLORIDE 2 MG/1
0.5 TABLET ORAL
Refills: 0 | Status: DISCONTINUED | OUTPATIENT
Start: 2024-12-06 | End: 2024-12-06

## 2024-12-06 RX ORDER — 0.9 % SODIUM CHLORIDE 0.9 %
1000 INTRAVENOUS SOLUTION INTRAVENOUS
Refills: 0 | Status: DISCONTINUED | OUTPATIENT
Start: 2024-12-06 | End: 2024-12-06

## 2024-12-06 RX ORDER — OXYCODONE HYDROCHLORIDE 30 MG/1
5 TABLET ORAL ONCE
Refills: 0 | Status: DISCONTINUED | OUTPATIENT
Start: 2024-12-06 | End: 2024-12-06

## 2024-12-06 RX ORDER — ONDANSETRON HYDROCHLORIDE 4 MG/1
4 TABLET, FILM COATED ORAL ONCE
Refills: 0 | Status: DISCONTINUED | OUTPATIENT
Start: 2024-12-06 | End: 2024-12-06

## 2024-12-06 RX ORDER — SILODOSIN 4 MG/1
1 CAPSULE ORAL
Refills: 0 | DISCHARGE

## 2024-12-06 RX ORDER — FAMOTIDINE 20 MG/1
1 TABLET, FILM COATED ORAL
Refills: 0 | DISCHARGE

## 2024-12-06 RX ORDER — ACETAMINOPHEN 500MG 500 MG/1
1000 TABLET, COATED ORAL ONCE
Refills: 0 | Status: DISCONTINUED | OUTPATIENT
Start: 2024-12-06 | End: 2024-12-06

## 2024-12-06 RX ORDER — HEPARIN SODIUM,PORCINE 1000/ML
5000 VIAL (ML) INJECTION ONCE
Refills: 0 | Status: COMPLETED | OUTPATIENT
Start: 2024-12-06 | End: 2024-12-06

## 2024-12-06 RX ORDER — CARVEDILOL 25 MG/1
1 TABLET, FILM COATED ORAL
Refills: 0 | DISCHARGE

## 2024-12-06 RX ADMIN — Medication 5000 UNIT(S): at 08:52

## 2024-12-06 NOTE — ASU DISCHARGE PLAN (ADULT/PEDIATRIC) - FINANCIAL ASSISTANCE
[facility] provides services at a reduced cost to those who are determined to be eligible through [facility]’s financial assistance program. Information regarding [facility]’s financial assistance program can be found by going to [email or web address] or by calling [phone].

## 2024-12-06 NOTE — BRIEF OPERATIVE NOTE - NSICDXBRIEFPREOP_GEN_ALL_CORE_FT
PRE-OP DIAGNOSIS:  Inguinal hernia, right 06-Dec-2024 08:59:14  Lars Brown  Hydrocele, right 06-Dec-2024 08:59:36  Lars Brown

## 2024-12-06 NOTE — ASU DISCHARGE PLAN (ADULT/PEDIATRIC) - ASU DC SPECIAL INSTRUCTIONSFT
Diet    Eat light on the day of surgery. Nausea and vomiting can occur after anesthesia,   but usually resolve within 24 hours.  Resume normal diet the following day.      Activity    Rest!  No heavy lifting or strenuous activity.    Medications    Extra-Strength Tylenol for pain.  Tramadol for severe pain only.  Your prescription was sent electronically to your pharmacy.  Remember, Tramadol is a strong narcotic pain reliever which can cause drowsiness, upset stomach and constipation.  It should always be taken with food.  You can use stool softener (Mineral Oil) or laxative (MiraLax or Dulcolax) if constipated.  An antibiotic is given during surgery.  No antibiotic needed at home.  Resume all previous medications.  Resume blood thinners the day after surgery unless told otherwise.    Wound Care    Leave surgical dressing in place.  May shower (dressing is waterproof.)  No pool, ocean, lake, hot-tub or bath for 3 weeks. If you were given an abdominal binder, please wear it as much as possible (day & night) for 2 weeks, but you must remove it for showers.  Ice packs to the area intermittently for several days help with pain and swelling.  Bruising (“black and blue”) is common.  Treatment is…Ice & Rest.       - You will see [facility] Physician Assistant, Arelis Leary, at your postoperative visit noted below.

## 2024-12-06 NOTE — ASU DISCHARGE PLAN (ADULT/PEDIATRIC) - NS MD DC FALL RISK RISK
For information on Fall & Injury Prevention, visit: [email or web address] OR  [email or web address] OR  https://www.cdc.gov/steadi/patient.html

## 2024-12-06 NOTE — BRIEF OPERATIVE NOTE - NSICDXBRIEFPROCEDURE_GEN_ALL_CORE_FT
PROCEDURES:  Repair of right inguinal hernia with mesh 06-Dec-2024 08:59:21  Lars Brown  Right hydrocelectomy 06-Dec-2024 08:59:33  Lars Brown

## 2024-12-06 NOTE — ASU DISCHARGE PLAN (ADULT/PEDIATRIC) - COMMENTS
- You will see [facility] Physician Assistant, Arelis Leary, at your postoperative visit noted above.

## 2024-12-06 NOTE — ASU DISCHARGE PLAN (ADULT/PEDIATRIC) - CARE PROVIDER_API CALL
Lars Brown  Surgery  [address] [place] [id number]  Phone: [phone]  Fax: [phone]  Scheduled Appointment: 12/16/2024 01:20 PM

## 2024-12-06 NOTE — BRIEF OPERATIVE NOTE - NSICDXBRIEFPOSTOP_GEN_ALL_CORE_FT
POST-OP DIAGNOSIS:  Inguinal hernia, right 06-Dec-2024 08:59:16  Lars Brown  Hydrocele, right 06-Dec-2024 08:59:38  Lars Brown

## 2024-12-09 LAB — SURGICAL PATHOLOGY STUDY: SIGNIFICANT CHANGE UP

## 2024-12-13 DIAGNOSIS — I73.9 PERIPHERAL VASCULAR DISEASE, UNSPECIFIED: ICD-10-CM

## 2024-12-13 DIAGNOSIS — J44.9 CHRONIC OBSTRUCTIVE PULMONARY DISEASE, UNSPECIFIED: ICD-10-CM

## 2024-12-13 DIAGNOSIS — G47.33 OBSTRUCTIVE SLEEP APNEA (ADULT) (PEDIATRIC): ICD-10-CM

## 2024-12-13 DIAGNOSIS — K40.90 UNILATERAL INGUINAL HERNIA, WITHOUT OBSTRUCTION OR GANGRENE, NOT SPECIFIED AS RECURRENT: ICD-10-CM

## 2024-12-13 DIAGNOSIS — I10 ESSENTIAL (PRIMARY) HYPERTENSION: ICD-10-CM

## 2024-12-13 DIAGNOSIS — F10.10 ALCOHOL ABUSE, UNCOMPLICATED: ICD-10-CM

## 2024-12-13 DIAGNOSIS — Z79.02 LONG TERM (CURRENT) USE OF ANTITHROMBOTICS/ANTIPLATELETS: ICD-10-CM

## 2024-12-13 DIAGNOSIS — Z95.2 PRESENCE OF PROSTHETIC HEART VALVE: ICD-10-CM

## 2024-12-13 DIAGNOSIS — N43.3 HYDROCELE, UNSPECIFIED: ICD-10-CM

## 2024-12-13 DIAGNOSIS — D68.51 ACTIVATED PROTEIN C RESISTANCE: ICD-10-CM

## 2024-12-13 DIAGNOSIS — F17.210 NICOTINE DEPENDENCE, CIGARETTES, UNCOMPLICATED: ICD-10-CM

## 2024-12-13 DIAGNOSIS — E78.5 HYPERLIPIDEMIA, UNSPECIFIED: ICD-10-CM

## 2024-12-13 DIAGNOSIS — Z79.01 LONG TERM (CURRENT) USE OF ANTICOAGULANTS: ICD-10-CM

## 2024-12-13 DIAGNOSIS — D17.6 BENIGN LIPOMATOUS NEOPLASM OF SPERMATIC CORD: ICD-10-CM

## 2024-12-16 ENCOUNTER — APPOINTMENT (OUTPATIENT)
Dept: SURGERY | Facility: CLINIC | Age: 69
End: 2024-12-16
Payer: MEDICARE

## 2024-12-16 DIAGNOSIS — K40.90 UNILATERAL INGUINAL HERNIA, W/OUT OBSTRUCTION OR GANGRENE, NOT SPECIFIED AS RECURRENT: ICD-10-CM

## 2024-12-16 PROCEDURE — 99024 POSTOP FOLLOW-UP VISIT: CPT

## 2025-03-20 ENCOUNTER — APPOINTMENT (OUTPATIENT)
Dept: PULMONOLOGY | Facility: CLINIC | Age: 70
End: 2025-03-20
Payer: MEDICARE

## 2025-03-20 VITALS
HEIGHT: 70 IN | OXYGEN SATURATION: 99 % | SYSTOLIC BLOOD PRESSURE: 136 MMHG | WEIGHT: 201 LBS | DIASTOLIC BLOOD PRESSURE: 70 MMHG | BODY MASS INDEX: 28.77 KG/M2 | HEART RATE: 76 BPM | RESPIRATION RATE: 14 BRPM

## 2025-03-20 DIAGNOSIS — F17.210 NICOTINE DEPENDENCE, CIGARETTES, UNCOMPLICATED: ICD-10-CM

## 2025-03-20 DIAGNOSIS — J43.2 CENTRILOBULAR EMPHYSEMA: ICD-10-CM

## 2025-03-20 PROCEDURE — G0296 VISIT TO DETERM LDCT ELIG: CPT

## 2025-03-20 PROCEDURE — 99214 OFFICE O/P EST MOD 30 MIN: CPT | Mod: 25

## 2025-03-20 PROCEDURE — 99406 BEHAV CHNG SMOKING 3-10 MIN: CPT

## 2025-06-19 ENCOUNTER — APPOINTMENT (OUTPATIENT)
Dept: PULMONOLOGY | Facility: CLINIC | Age: 70
End: 2025-06-19

## 2025-07-01 ENCOUNTER — APPOINTMENT (OUTPATIENT)
Age: 70
End: 2025-07-01
Payer: MEDICARE

## 2025-07-01 VITALS
DIASTOLIC BLOOD PRESSURE: 68 MMHG | HEIGHT: 70 IN | SYSTOLIC BLOOD PRESSURE: 143 MMHG | HEART RATE: 84 BPM | OXYGEN SATURATION: 100 % | RESPIRATION RATE: 16 BRPM | TEMPERATURE: 98 F | BODY MASS INDEX: 29.92 KG/M2 | WEIGHT: 209 LBS

## 2025-07-01 PROCEDURE — 99214 OFFICE O/P EST MOD 30 MIN: CPT
